# Patient Record
Sex: MALE | Race: WHITE | Employment: OTHER | URBAN - METROPOLITAN AREA
[De-identification: names, ages, dates, MRNs, and addresses within clinical notes are randomized per-mention and may not be internally consistent; named-entity substitution may affect disease eponyms.]

---

## 2017-01-03 ENCOUNTER — TELEPHONE (OUTPATIENT)
Dept: ORTHOPEDIC SURGERY | Age: 82
End: 2017-01-03

## 2017-01-10 ENCOUNTER — TELEPHONE (OUTPATIENT)
Dept: ORTHOPEDIC SURGERY | Age: 82
End: 2017-01-10

## 2017-01-12 ENCOUNTER — TELEPHONE (OUTPATIENT)
Dept: ORTHOPEDIC SURGERY | Age: 82
End: 2017-01-12

## 2017-01-18 ENCOUNTER — TELEPHONE (OUTPATIENT)
Dept: ORTHOPEDIC SURGERY | Age: 82
End: 2017-01-18

## 2017-01-19 ENCOUNTER — OFFICE VISIT (OUTPATIENT)
Dept: ORTHOPEDIC SURGERY | Age: 82
End: 2017-01-19

## 2017-01-19 VITALS
WEIGHT: 240.52 LBS | SYSTOLIC BLOOD PRESSURE: 133 MMHG | HEART RATE: 75 BPM | BODY MASS INDEX: 37.75 KG/M2 | HEIGHT: 67 IN | DIASTOLIC BLOOD PRESSURE: 71 MMHG

## 2017-01-19 DIAGNOSIS — Z98.890 STATUS POST LUMBAR LAMINECTOMY: Primary | ICD-10-CM

## 2017-01-19 PROCEDURE — 99024 POSTOP FOLLOW-UP VISIT: CPT | Performed by: PHYSICIAN ASSISTANT

## 2017-02-02 ENCOUNTER — OFFICE VISIT (OUTPATIENT)
Dept: ORTHOPEDIC SURGERY | Age: 82
End: 2017-02-02

## 2017-02-02 VITALS
DIASTOLIC BLOOD PRESSURE: 60 MMHG | WEIGHT: 240.52 LBS | HEART RATE: 83 BPM | BODY MASS INDEX: 37.75 KG/M2 | SYSTOLIC BLOOD PRESSURE: 104 MMHG | HEIGHT: 67 IN

## 2017-02-02 DIAGNOSIS — M48.061 LUMBAR SPINAL STENOSIS: Primary | ICD-10-CM

## 2017-02-02 PROCEDURE — 99024 POSTOP FOLLOW-UP VISIT: CPT | Performed by: ORTHOPAEDIC SURGERY

## 2017-02-14 ENCOUNTER — TELEPHONE (OUTPATIENT)
Dept: ORTHOPEDIC SURGERY | Age: 82
End: 2017-02-14

## 2017-02-14 RX ORDER — OXYCODONE HYDROCHLORIDE AND ACETAMINOPHEN 5; 325 MG/1; MG/1
1 TABLET ORAL EVERY 8 HOURS PRN
Qty: 60 TABLET | Refills: 0 | Status: ON HOLD | OUTPATIENT
Start: 2017-02-14 | End: 2017-04-24 | Stop reason: HOSPADM

## 2017-03-06 ENCOUNTER — OFFICE VISIT (OUTPATIENT)
Dept: ORTHOPEDIC SURGERY | Age: 82
End: 2017-03-06

## 2017-03-06 VITALS
BODY MASS INDEX: 37.75 KG/M2 | DIASTOLIC BLOOD PRESSURE: 77 MMHG | HEIGHT: 67 IN | HEART RATE: 86 BPM | SYSTOLIC BLOOD PRESSURE: 161 MMHG | WEIGHT: 240.52 LBS

## 2017-03-06 DIAGNOSIS — M51.36 LUMBAR DEGENERATIVE DISC DISEASE: Primary | ICD-10-CM

## 2017-03-06 PROCEDURE — 99024 POSTOP FOLLOW-UP VISIT: CPT | Performed by: ORTHOPAEDIC SURGERY

## 2017-03-14 ENCOUNTER — HOSPITAL ENCOUNTER (OUTPATIENT)
Dept: PHYSICAL THERAPY | Age: 82
Discharge: HOME | End: 2017-03-31
Admitting: ORTHOPAEDIC SURGERY

## 2017-03-20 ENCOUNTER — OFFICE VISIT (OUTPATIENT)
Dept: ORTHOPEDIC SURGERY | Age: 82
End: 2017-03-20

## 2017-03-20 VITALS
HEART RATE: 95 BPM | SYSTOLIC BLOOD PRESSURE: 112 MMHG | HEIGHT: 68 IN | BODY MASS INDEX: 36.68 KG/M2 | WEIGHT: 242 LBS | DIASTOLIC BLOOD PRESSURE: 66 MMHG

## 2017-03-20 DIAGNOSIS — M25.552 LEFT HIP PAIN: ICD-10-CM

## 2017-03-20 DIAGNOSIS — M16.12 PRIMARY OSTEOARTHRITIS OF LEFT HIP: Primary | ICD-10-CM

## 2017-03-20 PROCEDURE — G8484 FLU IMMUNIZE NO ADMIN: HCPCS | Performed by: ORTHOPAEDIC SURGERY

## 2017-03-20 PROCEDURE — 4040F PNEUMOC VAC/ADMIN/RCVD: CPT | Performed by: ORTHOPAEDIC SURGERY

## 2017-03-20 PROCEDURE — 99214 OFFICE O/P EST MOD 30 MIN: CPT | Performed by: ORTHOPAEDIC SURGERY

## 2017-03-20 PROCEDURE — G8417 CALC BMI ABV UP PARAM F/U: HCPCS | Performed by: ORTHOPAEDIC SURGERY

## 2017-03-20 PROCEDURE — 73502 X-RAY EXAM HIP UNI 2-3 VIEWS: CPT | Performed by: ORTHOPAEDIC SURGERY

## 2017-03-20 PROCEDURE — 1123F ACP DISCUSS/DSCN MKR DOCD: CPT | Performed by: ORTHOPAEDIC SURGERY

## 2017-03-20 PROCEDURE — G8427 DOCREV CUR MEDS BY ELIG CLIN: HCPCS | Performed by: ORTHOPAEDIC SURGERY

## 2017-03-20 PROCEDURE — 1036F TOBACCO NON-USER: CPT | Performed by: ORTHOPAEDIC SURGERY

## 2017-03-20 RX ORDER — HYDROCHLOROTHIAZIDE 25 MG/1
TABLET ORAL
COMMUNITY
Start: 2017-02-10 | End: 2017-04-10

## 2017-03-20 RX ORDER — METOPROLOL SUCCINATE 100 MG/1
100 TABLET, EXTENDED RELEASE ORAL DAILY
Status: ON HOLD | COMMUNITY
Start: 2017-02-14 | End: 2017-04-24 | Stop reason: HOSPADM

## 2017-03-21 ENCOUNTER — HOSPITAL ENCOUNTER (OUTPATIENT)
Dept: PHYSICAL THERAPY | Age: 82
Discharge: HOME | End: 2017-03-21
Admitting: ORTHOPAEDIC SURGERY

## 2017-03-21 ENCOUNTER — HOSPITAL ENCOUNTER (OUTPATIENT)
Dept: OTHER | Age: 82
Discharge: HOME | End: 2017-03-21
Attending: ORTHOPAEDIC SURGERY | Admitting: ORTHOPAEDIC SURGERY

## 2017-03-21 LAB
ALBUMIN SERPL-MCNC: 3.8 G/DL (ref 3.4–5)
ANION GAP SERPL CALCULATED.3IONS-SCNC: 18 MMOL/L (ref 3–16)
APTT: 28.9 SEC (ref 21–31.8)
BASOPHILS ABSOLUTE: 0 K/UL (ref 0–0.2)
BASOPHILS RELATIVE PERCENT: 0.5 %
BILIRUBIN URINE: NEGATIVE
BLOOD, URINE: NEGATIVE
BUN BLDV-MCNC: 16 MG/DL (ref 7–20)
CALCIUM SERPL-MCNC: 9.3 MG/DL (ref 8.3–10.6)
CHLORIDE BLD-SCNC: 100 MMOL/L (ref 99–110)
CLARITY: CLEAR
CO2: 22 MMOL/L (ref 21–32)
COLOR: YELLOW
CREAT SERPL-MCNC: 1 MG/DL (ref 0.8–1.3)
EOSINOPHILS ABSOLUTE: 0.2 K/UL (ref 0–0.6)
EOSINOPHILS RELATIVE PERCENT: 2.7 %
GFR AFRICAN AMERICAN: >60
GFR NON-AFRICAN AMERICAN: >60
GLUCOSE BLD-MCNC: 91 MG/DL (ref 70–99)
GLUCOSE URINE: NEGATIVE MG/DL
HCT VFR BLD CALC: 41.5 % (ref 40.5–52.5)
HEMOGLOBIN: 13.4 G/DL (ref 13.5–17.5)
INR BLD: 1.1 (ref 0.85–1.15)
KETONES, URINE: ABNORMAL MG/DL
LEUKOCYTE ESTERASE, URINE: NEGATIVE
LYMPHOCYTES ABSOLUTE: 1.4 K/UL (ref 1–5.1)
LYMPHOCYTES RELATIVE PERCENT: 16.1 %
MCH RBC QN AUTO: 29.9 PG (ref 26–34)
MCHC RBC AUTO-ENTMCNC: 32.4 G/DL (ref 31–36)
MCV RBC AUTO: 92.4 FL (ref 80–100)
MICROSCOPIC EXAMINATION: ABNORMAL
MONOCYTES ABSOLUTE: 1 K/UL (ref 0–1.3)
MONOCYTES RELATIVE PERCENT: 11.5 %
NEUTROPHILS ABSOLUTE: 6.2 K/UL (ref 1.7–7.7)
NEUTROPHILS RELATIVE PERCENT: 69.2 %
NITRITE, URINE: NEGATIVE
PDW BLD-RTO: 13.1 % (ref 12.4–15.4)
PH UA: 5.5
PLATELET # BLD: 326 K/UL (ref 135–450)
PMV BLD AUTO: 8 FL (ref 5–10.5)
POTASSIUM SERPL-SCNC: 4.6 MMOL/L (ref 3.5–5.1)
PROTEIN UA: NEGATIVE MG/DL
PROTHROMBIN TIME: 12.4 SEC (ref 9.6–13)
RBC # BLD: 4.5 M/UL (ref 4.2–5.9)
SODIUM BLD-SCNC: 140 MMOL/L (ref 136–145)
SPECIFIC GRAVITY UA: 1.02
TRANSFERRIN: 206 MG/DL (ref 200–360)
URINE TYPE: ABNORMAL
UROBILINOGEN, URINE: 0.2 E.U./DL
WBC # BLD: 9 K/UL (ref 4–11)

## 2017-03-22 LAB
ESTIMATED AVERAGE GLUCOSE: 148.5 MG/DL
HBA1C MFR BLD: 6.8 %

## 2017-03-23 LAB — URINE CULTURE, ROUTINE: NORMAL

## 2017-03-24 ENCOUNTER — TELEPHONE (OUTPATIENT)
Dept: ORTHOPEDIC SURGERY | Age: 82
End: 2017-03-24

## 2017-03-27 ENCOUNTER — HOSPITAL ENCOUNTER (OUTPATIENT)
Dept: PHYSICAL THERAPY | Age: 82
Discharge: HOME | End: 2017-03-27
Admitting: ORTHOPAEDIC SURGERY

## 2017-03-27 ENCOUNTER — TELEPHONE (OUTPATIENT)
Dept: ORTHOPEDIC SURGERY | Age: 82
End: 2017-03-27

## 2017-03-28 ENCOUNTER — TELEPHONE (OUTPATIENT)
Dept: ORTHOPEDIC SURGERY | Age: 82
End: 2017-03-28

## 2017-03-28 ENCOUNTER — HOSPITAL ENCOUNTER (OUTPATIENT)
Dept: PHYSICAL THERAPY | Age: 82
Discharge: HOME | End: 2017-03-28
Admitting: ORTHOPAEDIC SURGERY

## 2017-03-31 ENCOUNTER — TELEPHONE (OUTPATIENT)
Dept: ORTHOPEDIC SURGERY | Age: 82
End: 2017-03-31

## 2017-03-31 RX ORDER — HYDROCODONE BITARTRATE AND ACETAMINOPHEN 5; 325 MG/1; MG/1
1 TABLET ORAL EVERY 12 HOURS PRN
Qty: 40 TABLET | Refills: 0 | Status: SHIPPED | OUTPATIENT
Start: 2017-03-31 | End: 2017-04-10

## 2017-04-13 ENCOUNTER — TELEPHONE (OUTPATIENT)
Dept: ORTHOPEDIC SURGERY | Age: 82
End: 2017-04-13

## 2017-04-14 ENCOUNTER — OFFICE VISIT (OUTPATIENT)
Dept: ORTHOPEDIC SURGERY | Age: 82
End: 2017-04-14

## 2017-04-14 VITALS
SYSTOLIC BLOOD PRESSURE: 106 MMHG | BODY MASS INDEX: 35.55 KG/M2 | DIASTOLIC BLOOD PRESSURE: 64 MMHG | HEART RATE: 75 BPM | HEIGHT: 69 IN | WEIGHT: 240 LBS

## 2017-04-14 DIAGNOSIS — M54.50 LUMBAR SPINE PAIN: Primary | ICD-10-CM

## 2017-04-14 DIAGNOSIS — M51.36 DDD (DEGENERATIVE DISC DISEASE), LUMBAR: ICD-10-CM

## 2017-04-14 PROCEDURE — 1036F TOBACCO NON-USER: CPT | Performed by: PHYSICIAN ASSISTANT

## 2017-04-14 PROCEDURE — 4040F PNEUMOC VAC/ADMIN/RCVD: CPT | Performed by: PHYSICIAN ASSISTANT

## 2017-04-14 PROCEDURE — 99213 OFFICE O/P EST LOW 20 MIN: CPT | Performed by: PHYSICIAN ASSISTANT

## 2017-04-14 PROCEDURE — G8417 CALC BMI ABV UP PARAM F/U: HCPCS | Performed by: PHYSICIAN ASSISTANT

## 2017-04-14 PROCEDURE — G8427 DOCREV CUR MEDS BY ELIG CLIN: HCPCS | Performed by: PHYSICIAN ASSISTANT

## 2017-04-14 PROCEDURE — 1123F ACP DISCUSS/DSCN MKR DOCD: CPT | Performed by: PHYSICIAN ASSISTANT

## 2017-04-14 PROCEDURE — 72100 X-RAY EXAM L-S SPINE 2/3 VWS: CPT | Performed by: PHYSICIAN ASSISTANT

## 2017-04-14 RX ORDER — TIZANIDINE 4 MG/1
4 TABLET ORAL 3 TIMES DAILY
Qty: 30 TABLET | Refills: 0 | Status: SHIPPED | OUTPATIENT
Start: 2017-04-14 | End: 2017-04-14 | Stop reason: SDUPTHER

## 2017-04-17 DIAGNOSIS — M16.12 PRIMARY OSTEOARTHRITIS OF LEFT HIP: Primary | ICD-10-CM

## 2017-04-17 RX ORDER — TIZANIDINE 4 MG/1
TABLET ORAL
Qty: 270 TABLET | Refills: 0 | Status: ON HOLD | OUTPATIENT
Start: 2017-04-17 | End: 2017-04-24 | Stop reason: HOSPADM

## 2017-04-17 ASSESSMENT — PROMIS GLOBAL HEALTH SCALE
TO WHAT EXTENT ARE YOU ABLE TO CARRY OUT YOUR EVERYDAY PHYSICAL ACTIVITIES SUCH AS WALKING, CLIMBING STAIRS, CARRYING GROCERIES, OR MOVING A CHAIR [ON A SCALE OF 1 (NOT AT ALL) TO 5 (COMPLETELY)]?: 1
IN THE PAST 7 DAYS, HOW WOULD YOU RATE YOUR FATIGUE ON AVERAGE [ON A SCALE FROM 1 (NONE) TO 5 (VERY SEVERE)]?: 4
IN THE PAST 7 DAYS, HOW OFTEN HAVE YOU BEEN BOTHERED BY EMOTIONAL PROBLEMS, SUCH AS FEELING ANXIOUS, DEPRESSED, OR IRRITABLE [ON A SCALE FROM 1 (NEVER) TO 5 (ALWAYS)]?: 2
IN GENERAL, HOW WOULD YOU RATE YOUR PHYSICAL HEALTH [ON A SCALE OF 1 (POOR) TO 5 (EXCELLENT)]?: 2
IN GENERAL, WOULD YOU SAY YOUR HEALTH IS...[ON A SCALE OF 1 (POOR) TO 5 (EXCELLENT)]: 3
SUM OF RESPONSES TO QUESTIONS 3, 6, 7, & 8: 14
HOW IS THE PROMIS V1.1 BEING ADMINISTERED?: 0
IN GENERAL, HOW WOULD YOU RATE YOUR SATISFACTION WITH YOUR SOCIAL ACTIVITIES AND RELATIONSHIPS [ON A SCALE OF 1 (POOR) TO 5 (EXCELLENT)]?: 2
IN GENERAL, HOW WOULD YOU RATE YOUR MENTAL HEALTH, INCLUDING YOUR MOOD AND YOUR ABILITY TO THINK [ON A SCALE OF 1 (POOR) TO 5 (EXCELLENT)]?: 4
IN THE PAST 7 DAYS, HOW WOULD YOU RATE YOUR PAIN ON AVERAGE [ON A SCALE FROM 0 (NO PAIN) TO 10 (WORST IMAGINABLE PAIN)]?: 7
IN GENERAL, PLEASE RATE HOW WELL YOU CARRY OUT YOUR USUAL SOCIAL ACTIVITIES (INCLUDES ACTIVITIES AT HOME, AT WORK, AND IN YOUR COMMUNITY, AND RESPONSIBILITIES AS A PARENT, CHILD, SPOUSE, EMPLOYEE, FRIEND, ETC) [ON A SCALE OF 1 (POOR) TO 5 (EXCELLENT)]?: 1
IN GENERAL, WOULD YOU SAY YOUR QUALITY OF LIFE IS...[ON A SCALE OF 1 (POOR) TO 5 (EXCELLENT)]: 3
WHO IS THE PERSON COMPLETING THE PROMIS V1.1 SURVEY?: 0
SUM OF RESPONSES TO QUESTIONS 2, 4, 5, & 10: 11

## 2017-04-17 ASSESSMENT — HOOS JR
BENDING TO THE FLOOR TO PICK UP OBJECT: 2
LYING IN BED (TURNING OVER, MAINTAINING HIP POSITION): 3
RISING FROM SITTING: 3
HOOS JR RAW SCORE: 13
SITTING: 1
WALKING ON UNEVEN SURFACE: 2
GOING UP OR DOWN STAIRS: 2

## 2017-04-19 PROBLEM — Z96.642 STATUS POST TOTAL REPLACEMENT OF LEFT HIP: Status: ACTIVE | Noted: 2017-04-19

## 2017-04-27 ENCOUNTER — CARE COORDINATION (OUTPATIENT)
Dept: CASE MANAGEMENT | Age: 82
End: 2017-04-27

## 2017-05-01 ENCOUNTER — OFFICE VISIT (OUTPATIENT)
Dept: ORTHOPEDIC SURGERY | Age: 82
End: 2017-05-01

## 2017-05-01 VITALS
HEIGHT: 69 IN | WEIGHT: 260.58 LBS | SYSTOLIC BLOOD PRESSURE: 113 MMHG | HEART RATE: 89 BPM | BODY MASS INDEX: 38.6 KG/M2 | DIASTOLIC BLOOD PRESSURE: 56 MMHG

## 2017-05-01 DIAGNOSIS — Z96.642 STATUS POST TOTAL REPLACEMENT OF LEFT HIP: Primary | ICD-10-CM

## 2017-05-01 DIAGNOSIS — M16.12 PRIMARY OSTEOARTHRITIS OF LEFT HIP: ICD-10-CM

## 2017-05-01 DIAGNOSIS — M25.552 HIP PAIN, LEFT: ICD-10-CM

## 2017-05-01 PROCEDURE — 73502 X-RAY EXAM HIP UNI 2-3 VIEWS: CPT | Performed by: ORTHOPAEDIC SURGERY

## 2017-05-01 PROCEDURE — 99024 POSTOP FOLLOW-UP VISIT: CPT | Performed by: ORTHOPAEDIC SURGERY

## 2017-05-04 ENCOUNTER — CARE COORDINATION (OUTPATIENT)
Dept: CASE MANAGEMENT | Age: 82
End: 2017-05-04

## 2017-05-11 ENCOUNTER — CARE COORDINATION (OUTPATIENT)
Dept: CASE MANAGEMENT | Age: 82
End: 2017-05-11

## 2017-05-19 ENCOUNTER — CARE COORDINATION (OUTPATIENT)
Dept: CASE MANAGEMENT | Age: 82
End: 2017-05-19

## 2017-05-22 ENCOUNTER — CARE COORDINATION (OUTPATIENT)
Dept: CASE MANAGEMENT | Age: 82
End: 2017-05-22

## 2017-05-30 ENCOUNTER — OFFICE VISIT (OUTPATIENT)
Dept: ORTHOPEDIC SURGERY | Age: 82
End: 2017-05-30

## 2017-05-30 ENCOUNTER — CARE COORDINATION (OUTPATIENT)
Dept: CASE MANAGEMENT | Age: 82
End: 2017-05-30

## 2017-05-30 VITALS
BODY MASS INDEX: 38.6 KG/M2 | SYSTOLIC BLOOD PRESSURE: 142 MMHG | HEIGHT: 69 IN | WEIGHT: 260.58 LBS | DIASTOLIC BLOOD PRESSURE: 73 MMHG | HEART RATE: 74 BPM

## 2017-05-30 DIAGNOSIS — M54.2 NECK PAIN: Primary | ICD-10-CM

## 2017-05-30 DIAGNOSIS — M48.02 CERVICAL STENOSIS OF SPINAL CANAL: ICD-10-CM

## 2017-05-30 DIAGNOSIS — M16.10 PRIMARY OSTEOARTHRITIS OF HIP, UNSPECIFIED LATERALITY: Primary | ICD-10-CM

## 2017-05-30 PROCEDURE — 1036F TOBACCO NON-USER: CPT | Performed by: ORTHOPAEDIC SURGERY

## 2017-05-30 PROCEDURE — G8417 CALC BMI ABV UP PARAM F/U: HCPCS | Performed by: ORTHOPAEDIC SURGERY

## 2017-05-30 PROCEDURE — 72040 X-RAY EXAM NECK SPINE 2-3 VW: CPT | Performed by: ORTHOPAEDIC SURGERY

## 2017-05-30 PROCEDURE — 1123F ACP DISCUSS/DSCN MKR DOCD: CPT | Performed by: ORTHOPAEDIC SURGERY

## 2017-05-30 PROCEDURE — 4040F PNEUMOC VAC/ADMIN/RCVD: CPT | Performed by: ORTHOPAEDIC SURGERY

## 2017-05-30 PROCEDURE — 99213 OFFICE O/P EST LOW 20 MIN: CPT | Performed by: ORTHOPAEDIC SURGERY

## 2017-05-30 PROCEDURE — G8427 DOCREV CUR MEDS BY ELIG CLIN: HCPCS | Performed by: ORTHOPAEDIC SURGERY

## 2017-05-30 RX ORDER — METHYLPREDNISOLONE 4 MG/1
TABLET ORAL
Qty: 1 KIT | Refills: 0 | Status: SHIPPED | OUTPATIENT
Start: 2017-05-30 | End: 2017-06-05

## 2017-05-30 RX ORDER — TRAMADOL HYDROCHLORIDE 50 MG/1
TABLET ORAL
Qty: 20 TABLET | Refills: 0 | Status: SHIPPED | OUTPATIENT
Start: 2017-05-30 | End: 2017-06-06 | Stop reason: SDUPTHER

## 2017-05-31 ENCOUNTER — OFFICE VISIT (OUTPATIENT)
Dept: ORTHOPEDIC SURGERY | Age: 82
End: 2017-05-31

## 2017-05-31 DIAGNOSIS — Z96.642 STATUS POST TOTAL REPLACEMENT OF LEFT HIP: Primary | ICD-10-CM

## 2017-05-31 PROCEDURE — 99024 POSTOP FOLLOW-UP VISIT: CPT | Performed by: PHYSICIAN ASSISTANT

## 2017-06-02 ENCOUNTER — HOSPITAL ENCOUNTER (OUTPATIENT)
Dept: PHYSICAL THERAPY | Age: 82
Discharge: HOME | End: 2017-06-30
Admitting: ORTHOPAEDIC SURGERY

## 2017-06-02 ENCOUNTER — HOSPITAL ENCOUNTER (OUTPATIENT)
Dept: PHYSICAL THERAPY | Age: 82
Discharge: HOME | End: 2017-06-02

## 2017-06-05 ENCOUNTER — HOSPITAL ENCOUNTER (OUTPATIENT)
Dept: PHYSICAL THERAPY | Age: 82
Discharge: HOME | End: 2017-06-05
Admitting: ORTHOPAEDIC SURGERY

## 2017-06-05 ENCOUNTER — CARE COORDINATION (OUTPATIENT)
Dept: CASE MANAGEMENT | Age: 82
End: 2017-06-05

## 2017-06-05 DIAGNOSIS — M16.10 PRIMARY OSTEOARTHRITIS OF HIP, UNSPECIFIED LATERALITY: ICD-10-CM

## 2017-06-05 RX ORDER — TRAMADOL HYDROCHLORIDE 50 MG/1
TABLET ORAL
Qty: 20 TABLET | Refills: 0 | Status: CANCELLED | OUTPATIENT
Start: 2017-06-05

## 2017-06-06 DIAGNOSIS — M16.10 PRIMARY OSTEOARTHRITIS OF HIP, UNSPECIFIED LATERALITY: ICD-10-CM

## 2017-06-06 RX ORDER — TRAMADOL HYDROCHLORIDE 50 MG/1
TABLET ORAL
Qty: 20 TABLET | Refills: 0 | Status: SHIPPED | OUTPATIENT
Start: 2017-06-06 | End: 2017-06-13 | Stop reason: SDUPTHER

## 2017-06-08 ENCOUNTER — HOSPITAL ENCOUNTER (OUTPATIENT)
Dept: PHYSICAL THERAPY | Age: 82
Discharge: HOME | End: 2017-06-08
Admitting: ORTHOPAEDIC SURGERY

## 2017-06-12 ENCOUNTER — OFFICE VISIT (OUTPATIENT)
Dept: ORTHOPEDIC SURGERY | Age: 82
End: 2017-06-12

## 2017-06-12 VITALS
BODY MASS INDEX: 38.6 KG/M2 | HEIGHT: 69 IN | HEART RATE: 85 BPM | WEIGHT: 260.58 LBS | DIASTOLIC BLOOD PRESSURE: 69 MMHG | SYSTOLIC BLOOD PRESSURE: 123 MMHG

## 2017-06-12 DIAGNOSIS — M51.36 LUMBAR DEGENERATIVE DISC DISEASE: Primary | ICD-10-CM

## 2017-06-12 PROCEDURE — G8427 DOCREV CUR MEDS BY ELIG CLIN: HCPCS | Performed by: ORTHOPAEDIC SURGERY

## 2017-06-12 PROCEDURE — G8417 CALC BMI ABV UP PARAM F/U: HCPCS | Performed by: ORTHOPAEDIC SURGERY

## 2017-06-12 PROCEDURE — 4040F PNEUMOC VAC/ADMIN/RCVD: CPT | Performed by: ORTHOPAEDIC SURGERY

## 2017-06-12 PROCEDURE — 1123F ACP DISCUSS/DSCN MKR DOCD: CPT | Performed by: ORTHOPAEDIC SURGERY

## 2017-06-12 PROCEDURE — 99213 OFFICE O/P EST LOW 20 MIN: CPT | Performed by: ORTHOPAEDIC SURGERY

## 2017-06-12 PROCEDURE — 1036F TOBACCO NON-USER: CPT | Performed by: ORTHOPAEDIC SURGERY

## 2017-06-13 ENCOUNTER — CARE COORDINATION (OUTPATIENT)
Dept: CASE MANAGEMENT | Age: 82
End: 2017-06-13

## 2017-06-13 ENCOUNTER — HOSPITAL ENCOUNTER (OUTPATIENT)
Dept: PHYSICAL THERAPY | Age: 82
Discharge: HOME | End: 2017-06-13
Admitting: ORTHOPAEDIC SURGERY

## 2017-06-13 DIAGNOSIS — M16.10 PRIMARY OSTEOARTHRITIS OF HIP, UNSPECIFIED LATERALITY: ICD-10-CM

## 2017-06-13 RX ORDER — TRAMADOL HYDROCHLORIDE 50 MG/1
TABLET ORAL
Qty: 20 TABLET | Refills: 0 | OUTPATIENT
Start: 2017-06-13 | End: 2017-06-22 | Stop reason: SDUPTHER

## 2017-06-15 ENCOUNTER — HOSPITAL ENCOUNTER (OUTPATIENT)
Dept: MRI IMAGING | Age: 82
Discharge: HOME | End: 2017-06-15
Attending: ORTHOPAEDIC SURGERY | Admitting: ORTHOPAEDIC SURGERY

## 2017-06-15 ENCOUNTER — HOSPITAL ENCOUNTER (OUTPATIENT)
Dept: PHYSICAL THERAPY | Age: 82
Discharge: HOME | End: 2017-06-15
Admitting: ORTHOPAEDIC SURGERY

## 2017-06-15 DIAGNOSIS — M51.36 OTHER INTERVERTEBRAL DISC DEGENERATION, LUMBAR REGION: ICD-10-CM

## 2017-06-15 DIAGNOSIS — M51.36 LUMBAR DEGENERATIVE DISC DISEASE: ICD-10-CM

## 2017-06-15 LAB
BUN BLDV-MCNC: 15 MG/DL (ref 7–20)
CREAT SERPL-MCNC: 0.8 MG/DL (ref 0.8–1.3)
GFR AFRICAN AMERICAN: >60
GFR NON-AFRICAN AMERICAN: >60

## 2017-06-16 ENCOUNTER — TELEPHONE (OUTPATIENT)
Dept: ORTHOPEDIC SURGERY | Age: 82
End: 2017-06-16

## 2017-06-16 DIAGNOSIS — M51.36 DDD (DEGENERATIVE DISC DISEASE), LUMBAR: Primary | ICD-10-CM

## 2017-06-16 LAB
25-HYDROXYVITAMIN D2 AND D3: 40 NG/ML (ref 30–80)
A/G RATIO: 1.8 (ref 1–2.1)
ALBUMIN SERPL-MCNC: 4 G/DL (ref 3.5–5)
ALP BLD-CCNC: 139 U/L (ref 33–140)
ALT SERPL-CCNC: 12 U/L (ref 0–60)
ANION GAP SERPL CALCULATED.3IONS-SCNC: 12 MMOL/L (ref 5–13)
AST SERPL-CCNC: 15 U/L (ref 0–40)
BASOPHILS ABSOLUTE: 0 10*3/UL (ref 0–0.2)
BASOPHILS RELATIVE PERCENT: 0.4 %
BILIRUB SERPL-MCNC: 1.3 MG/DL (ref 0.2–1.2)
BUN / CREAT RATIO: 15
BUN BLDV-MCNC: 14 MG/DL (ref 7–25)
CALCIUM SERPL-MCNC: 9.4 MG/DL (ref 8.4–10.5)
CHLORIDE BLD-SCNC: 96 MMOL/L (ref 98–110)
CHOLESTEROL, TOTAL: 138 MG/DL (ref 125–199)
CHOLESTEROL/HDL RATIO: 3.3 (ref 0–5)
CO2: 28 MMOL/L (ref 22–29)
CREAT SERPL-MCNC: 0.93 MG/DL (ref 0.5–1.3)
EOSINOPHILS ABSOLUTE: 0.3 10*3/UL (ref 0–0.5)
EOSINOPHILS RELATIVE PERCENT: 2.9 %
ERYTHROCYTE SEDIMENTATION RATE: 37 MM/HR (ref 0–20)
ESTIMATED AVERAGE GLUCOSE: 103 MG/DL (ref 68–114)
GFR AFRICAN AMERICAN: 95 SEE NOTE
GFR NON-AFRICAN AMERICAN: 78 SEE NOTE
GLOBULIN: 2.2 G/DL (ref 2–3.7)
GLUCOSE BLD-MCNC: 91 MG/DL (ref 70–99)
GRANULOCYTE ABSOLUTE COUNT: 6.5 10*3/UL (ref 1.5–7.8)
HBA1C MFR BLD: 5.2 % (ref 4–5.6)
HCT VFR BLD CALC: 39.5 % (ref 38.5–50)
HDLC SERPL-MCNC: 42 MG/DL (ref 40–180)
HEMOGLOBIN: 12.8 G/DL (ref 13.2–17.1)
LDL CHOLESTEROL CALCULATED: 68 MG/DL (ref 0–100)
LYMPHOCYTES ABSOLUTE: 1.4 10*3/UL (ref 0.8–3.9)
LYMPHOCYTES RELATIVE PERCENT: 14.8 %
MCH RBC QN AUTO: 27.9 PG (ref 27–33)
MCHC RBC AUTO-ENTMCNC: 32.4 G/DL (ref 32–36)
MCV RBC AUTO: 86 FL (ref 80–100)
MONOCYTES ABSOLUTE: 1.2 10*3/UL (ref 0.2–0.9)
MONOCYTES RELATIVE PERCENT: 12.5 %
PDW BLD-RTO: 16.1 % (ref 11–15)
PLATELET # BLD: 281 10*3/UL (ref 140–400)
PLATELET ESTIMATE: NORMAL
PMV BLD AUTO: 8.7 FL (ref 7.5–11.5)
POTASSIUM SERPL-SCNC: 5 MMOL/L (ref 3.5–5.1)
RBC # BLD: 4.6 10*6/UL (ref 4.2–5.8)
SCAN DIFF: ABNORMAL
SEGMENTED NEUTROPHILS RELATIVE PERCENT: 69.4 %
SODIUM BLD-SCNC: 136 MMOL/L (ref 135–146)
TOTAL PROTEIN: 6.2 G/DL (ref 6–8)
TRIGL SERPL-MCNC: 141 MG/DL (ref 0–150)
TSH ULTRASENSITIVE: 2.71 UIU/ML (ref 0.35–4.94)
WBC # BLD: 9.3 10*3/UL (ref 3.8–10.8)

## 2017-06-21 DIAGNOSIS — M51.36 DDD (DEGENERATIVE DISC DISEASE), LUMBAR: Primary | ICD-10-CM

## 2017-06-22 ENCOUNTER — HOSPITAL ENCOUNTER (OUTPATIENT)
Dept: PHYSICAL THERAPY | Age: 82
Discharge: HOME | End: 2017-06-22
Admitting: ORTHOPAEDIC SURGERY

## 2017-06-22 DIAGNOSIS — M16.10 PRIMARY OSTEOARTHRITIS OF HIP, UNSPECIFIED LATERALITY: ICD-10-CM

## 2017-06-22 LAB — ERYTHROCYTE SEDIMENTATION RATE: 23 MM/HR (ref 0–20)

## 2017-06-22 RX ORDER — TRAMADOL HYDROCHLORIDE 50 MG/1
TABLET ORAL
Qty: 20 TABLET | Refills: 0 | OUTPATIENT
Start: 2017-06-22 | End: 2017-06-28 | Stop reason: SDUPTHER

## 2017-06-27 ENCOUNTER — TELEPHONE (OUTPATIENT)
Dept: ORTHOPEDIC SURGERY | Age: 82
End: 2017-06-27

## 2017-06-27 ENCOUNTER — CARE COORDINATION (OUTPATIENT)
Dept: CASE MANAGEMENT | Age: 82
End: 2017-06-27

## 2017-06-28 DIAGNOSIS — M16.10 PRIMARY OSTEOARTHRITIS OF HIP, UNSPECIFIED LATERALITY: ICD-10-CM

## 2017-06-29 ENCOUNTER — HOSPITAL ENCOUNTER (OUTPATIENT)
Dept: PHYSICAL THERAPY | Age: 82
Discharge: HOME | End: 2017-06-29
Admitting: ORTHOPAEDIC SURGERY

## 2017-06-29 RX ORDER — TRAMADOL HYDROCHLORIDE 50 MG/1
TABLET ORAL
Qty: 20 TABLET | Refills: 0 | OUTPATIENT
Start: 2017-06-29 | End: 2017-07-10 | Stop reason: SDUPTHER

## 2017-06-30 ENCOUNTER — TELEPHONE (OUTPATIENT)
Dept: ORTHOPEDIC SURGERY | Age: 82
End: 2017-06-30

## 2017-07-03 ENCOUNTER — OFFICE VISIT (OUTPATIENT)
Dept: ORTHOPEDIC SURGERY | Age: 82
End: 2017-07-03

## 2017-07-03 VITALS
SYSTOLIC BLOOD PRESSURE: 125 MMHG | DIASTOLIC BLOOD PRESSURE: 62 MMHG | HEIGHT: 69 IN | BODY MASS INDEX: 38.6 KG/M2 | HEART RATE: 75 BPM | WEIGHT: 260.58 LBS

## 2017-07-03 DIAGNOSIS — M48.02 CERVICAL STENOSIS OF SPINAL CANAL: Primary | ICD-10-CM

## 2017-07-03 PROCEDURE — 1036F TOBACCO NON-USER: CPT | Performed by: ORTHOPAEDIC SURGERY

## 2017-07-03 PROCEDURE — 1123F ACP DISCUSS/DSCN MKR DOCD: CPT | Performed by: ORTHOPAEDIC SURGERY

## 2017-07-03 PROCEDURE — G8427 DOCREV CUR MEDS BY ELIG CLIN: HCPCS | Performed by: ORTHOPAEDIC SURGERY

## 2017-07-03 PROCEDURE — 99213 OFFICE O/P EST LOW 20 MIN: CPT | Performed by: ORTHOPAEDIC SURGERY

## 2017-07-03 PROCEDURE — G8417 CALC BMI ABV UP PARAM F/U: HCPCS | Performed by: ORTHOPAEDIC SURGERY

## 2017-07-03 PROCEDURE — 4040F PNEUMOC VAC/ADMIN/RCVD: CPT | Performed by: ORTHOPAEDIC SURGERY

## 2017-07-03 RX ORDER — METOPROLOL TARTRATE 50 MG/1
TABLET, FILM COATED ORAL
Refills: 0 | COMMUNITY
Start: 2017-05-16 | End: 2018-01-22 | Stop reason: ALTCHOICE

## 2017-07-03 RX ORDER — OXYCODONE HYDROCHLORIDE AND ACETAMINOPHEN 5; 325 MG/1; MG/1
TABLET ORAL
COMMUNITY
End: 2017-07-11 | Stop reason: ALTCHOICE

## 2017-07-03 RX ORDER — OXYCODONE HYDROCHLORIDE AND ACETAMINOPHEN 5; 325 MG/1; MG/1
TABLET ORAL
COMMUNITY
Start: 2017-02-14 | End: 2017-07-11 | Stop reason: ALTCHOICE

## 2017-07-07 ENCOUNTER — HOSPITAL ENCOUNTER (OUTPATIENT)
Dept: PHYSICAL THERAPY | Age: 82
Discharge: HOME | End: 2017-07-31
Admitting: ORTHOPAEDIC SURGERY

## 2017-07-10 ENCOUNTER — CARE COORDINATION (OUTPATIENT)
Dept: CASE MANAGEMENT | Age: 82
End: 2017-07-10

## 2017-07-10 DIAGNOSIS — M16.10 PRIMARY OSTEOARTHRITIS OF HIP, UNSPECIFIED LATERALITY: ICD-10-CM

## 2017-07-10 RX ORDER — TRAMADOL HYDROCHLORIDE 50 MG/1
TABLET ORAL
Qty: 20 TABLET | Refills: 0 | OUTPATIENT
Start: 2017-07-10 | End: 2018-01-29 | Stop reason: ALTCHOICE

## 2017-07-11 ENCOUNTER — HOSPITAL ENCOUNTER (OUTPATIENT)
Dept: PHYSICAL THERAPY | Age: 82
Discharge: HOME | End: 2017-07-11
Admitting: ORTHOPAEDIC SURGERY

## 2017-07-11 ENCOUNTER — OFFICE VISIT (OUTPATIENT)
Dept: ORTHOPEDIC SURGERY | Age: 82
End: 2017-07-11

## 2017-07-11 VITALS
BODY MASS INDEX: 38.6 KG/M2 | DIASTOLIC BLOOD PRESSURE: 81 MMHG | HEART RATE: 68 BPM | HEIGHT: 69 IN | WEIGHT: 260.58 LBS | SYSTOLIC BLOOD PRESSURE: 147 MMHG

## 2017-07-11 DIAGNOSIS — M16.10 PRIMARY OSTEOARTHRITIS OF HIP, UNSPECIFIED LATERALITY: Primary | ICD-10-CM

## 2017-07-11 PROCEDURE — 99024 POSTOP FOLLOW-UP VISIT: CPT | Performed by: PHYSICIAN ASSISTANT

## 2017-07-13 ENCOUNTER — HOSPITAL ENCOUNTER (OUTPATIENT)
Dept: PHYSICAL THERAPY | Age: 82
Discharge: HOME | End: 2017-07-13
Admitting: ORTHOPAEDIC SURGERY

## 2017-07-18 ENCOUNTER — HOSPITAL ENCOUNTER (OUTPATIENT)
Dept: PHYSICAL THERAPY | Age: 82
Discharge: HOME | End: 2017-07-18
Admitting: ORTHOPAEDIC SURGERY

## 2017-07-24 ENCOUNTER — TELEPHONE (OUTPATIENT)
Dept: ORTHOPEDIC SURGERY | Age: 82
End: 2017-07-24

## 2017-07-25 ENCOUNTER — HOSPITAL ENCOUNTER (OUTPATIENT)
Dept: PHYSICAL THERAPY | Age: 82
Discharge: HOME | End: 2017-07-25
Admitting: ORTHOPAEDIC SURGERY

## 2017-07-27 ENCOUNTER — OFFICE VISIT (OUTPATIENT)
Dept: ORTHOPEDIC SURGERY | Age: 82
End: 2017-07-27

## 2017-07-27 DIAGNOSIS — M54.12 CERVICAL RADICULOPATHY: Primary | ICD-10-CM

## 2017-07-27 DIAGNOSIS — G56.23 ULNAR NEUROPATHY OF BOTH UPPER EXTREMITIES: ICD-10-CM

## 2017-07-27 PROCEDURE — 95909 NRV CNDJ TST 5-6 STUDIES: CPT | Performed by: PHYSICAL MEDICINE & REHABILITATION

## 2017-07-27 PROCEDURE — 1036F TOBACCO NON-USER: CPT | Performed by: PHYSICAL MEDICINE & REHABILITATION

## 2017-07-27 PROCEDURE — 95886 MUSC TEST DONE W/N TEST COMP: CPT | Performed by: PHYSICAL MEDICINE & REHABILITATION

## 2017-08-01 ENCOUNTER — HOSPITAL ENCOUNTER (OUTPATIENT)
Dept: PHYSICAL THERAPY | Age: 82
Discharge: HOME | End: 2017-08-01
Admitting: ORTHOPAEDIC SURGERY

## 2017-08-08 ENCOUNTER — HOSPITAL ENCOUNTER (OUTPATIENT)
Dept: PHYSICAL THERAPY | Age: 82
Discharge: HOME | End: 2017-08-08
Admitting: ORTHOPAEDIC SURGERY

## 2017-08-11 ENCOUNTER — TELEPHONE (OUTPATIENT)
Dept: ORTHOPEDIC SURGERY | Age: 82
End: 2017-08-11

## 2017-08-14 ENCOUNTER — TELEPHONE (OUTPATIENT)
Dept: ORTHOPEDIC SURGERY | Age: 82
End: 2017-08-14

## 2017-08-16 ENCOUNTER — HOSPITAL ENCOUNTER (OUTPATIENT)
Dept: PHYSICAL THERAPY | Age: 82
Discharge: HOME | End: 2017-08-16
Admitting: ORTHOPAEDIC SURGERY

## 2017-09-28 ENCOUNTER — TELEPHONE (OUTPATIENT)
Dept: ORTHOPEDIC SURGERY | Age: 82
End: 2017-09-28

## 2017-10-04 ENCOUNTER — OFFICE VISIT (OUTPATIENT)
Dept: ORTHOPEDIC SURGERY | Age: 82
End: 2017-10-04

## 2017-10-04 ENCOUNTER — HOSPITAL ENCOUNTER (OUTPATIENT)
Dept: OCCUPATIONAL THERAPY | Age: 82
Discharge: HOME | End: 2017-10-31
Admitting: ORTHOPAEDIC SURGERY

## 2017-10-04 VITALS
HEART RATE: 99 BPM | BODY MASS INDEX: 37.35 KG/M2 | HEIGHT: 67 IN | WEIGHT: 238 LBS | DIASTOLIC BLOOD PRESSURE: 51 MMHG | SYSTOLIC BLOOD PRESSURE: 97 MMHG

## 2017-10-04 DIAGNOSIS — R52 PAIN: Primary | ICD-10-CM

## 2017-10-04 DIAGNOSIS — M19.049 HAND ARTHRITIS: ICD-10-CM

## 2017-10-04 PROCEDURE — 20600 DRAIN/INJ JOINT/BURSA W/O US: CPT | Performed by: ORTHOPAEDIC SURGERY

## 2017-10-04 PROCEDURE — 1123F ACP DISCUSS/DSCN MKR DOCD: CPT | Performed by: ORTHOPAEDIC SURGERY

## 2017-10-04 PROCEDURE — 73130 X-RAY EXAM OF HAND: CPT | Performed by: ORTHOPAEDIC SURGERY

## 2017-10-04 PROCEDURE — G8484 FLU IMMUNIZE NO ADMIN: HCPCS | Performed by: ORTHOPAEDIC SURGERY

## 2017-10-04 PROCEDURE — 99203 OFFICE O/P NEW LOW 30 MIN: CPT | Performed by: ORTHOPAEDIC SURGERY

## 2017-10-04 PROCEDURE — G8417 CALC BMI ABV UP PARAM F/U: HCPCS | Performed by: ORTHOPAEDIC SURGERY

## 2017-10-04 PROCEDURE — 4040F PNEUMOC VAC/ADMIN/RCVD: CPT | Performed by: ORTHOPAEDIC SURGERY

## 2017-10-04 PROCEDURE — 1036F TOBACCO NON-USER: CPT | Performed by: ORTHOPAEDIC SURGERY

## 2017-10-04 PROCEDURE — G8427 DOCREV CUR MEDS BY ELIG CLIN: HCPCS | Performed by: ORTHOPAEDIC SURGERY

## 2017-10-04 NOTE — PLAN OF CARE
splint. Onset/Surgery Date: no surgery, MCP arthroplasties recommended  Dominant Hand:    [x] Right []Left  Occupational/Vocational Status: retired  Progress of any previous OT/PT: the patient []has/ [x]has not received OT/PT previously for this diagnosis. Pain: 5    Objective Findings as appropriate:  ROM, strength, edema, wound/ scar appearance, function:  Visible swollen MCP joints most pronounced in right index and middle. Band aid over dorsum of joints from recent cortisone injection. Some deformities in DIP joints of right hand as well. Patient uses a straight cane for ambulation in right hand. Type of splint:   Volar hand based MCP flexion block to right index and middle fingers. Splint protocol utilization:   Recommended at night and as needed for pain during the day. Splint Purpose: [x]Immobilize or protect []Promote healing of    [x]Relieve pain  [x]Provide support for improved hand function []Maximize joint motion    Treatment:   [x]Splint provided ([x]Customized/ []Prefabricated), and splint rationale explained. [x]Patient instructed in []wear/ [x]care of splint and educated regarding diagnosis. []Patient instructed in symptom reduction techniques   []HEP instruction    []Discussed ADL assistive device    Written Information Distributed: []HEP  [x]Splint care and wearing protocol    Patient response to evaluation and instructions:  [x]Attentive/interested   [x]Asked questions/ retained info  []Appeared disinterested  []Poor retention of information  []Appeared anxious/ fearful    Assessment and Plan:  Goals: [x]Patient will be able to verbalize rationale for, and demonstrate proper wearing     of splint. [x]Splint will provide proper fit and function. []Patient will be able to verbalize 2-3 ways to prevent further symptoms. [x]Patient will be able to don and doff independently.    []Patient will be independent with HEP    Goals met:  [x]yes []no    Plan:  [x]Splint completed with good fit and function. Hand Therapy to follow up for     splint modifications as needed    []Splint completed; OT/PT evaluation initiated. Patient to return for further     treatment.     Jeb Snyder , [address] [facility]

## 2017-10-04 NOTE — PROGRESS NOTES
Assessment: Severe osteoarthritis of the right hand particularly today the index and middle metacarpal phalangeal joints are quite painful. He also has grade 4 CMC arthritis. He has developed a significant bone spur at the interphalangeal joint of the thumb. 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 I do not think he would do well with MP joint replacements as he still fairly strong gentleman and does walk with a cane. I think MP arthrodesis would probably be necessary if surgical intervention becomes necessary. We are going to try to treat this more conservatively and have injected both the index and middle MP joints today as well as giving him a hand-based splint to use when he is quite symptomatic. Return if symptoms worsen or fail to improve. Chief Complaint:  Hand Problem (Several year history of right hand pain and swelling- PCP gave -prednisone pack 2 weeks ago that helped with the swelling and it is back now)      History of Present Illness  Dav Boateng is a 80 y.o. male. Patient's very nice gentleman that I saw about 3-1/2 years ago for osteoarthritis in multiple locations we previously taken osteophyte mucous cyst off of one of his digits. He is been having increasing pain and swelling in the index and middle metacarpal phalangeal joints. Dr. Yair Virk gave him a prescription for Medrol Dosepak and while he was taking this really helped the swelling and discomfort but as soon as he stopped this he started to have increasing discomfort again. The pain is mainly in the index and middle MP joints and a becomes quite swollen. Medical History  Patient's medications, allergies, past medical, surgical, social and family histories were reviewed and updated as appropriate.     Review of Systems  Complete Review of Systems performed and is non-contributory except for what is noted in HPI. Vital Signs  Vitals:    10/04/17 1414   BP: (!) 97/51   Site: Right Arm   Position: Sitting   Pulse: 99   Weight: 238 lb (108 kg)   Height: 5' 7\" (1.702 m)     Body mass index is 37.28 kg/(m^2). Physical Exam  Constitutional:  Patient is well-nourished and demonstrates normal hygiene. Mental Status:  Patient is alert and oriented to person, place and time. Skin:  No rashes or erythema    Right Hand Examination:  Inspection:  Definite objective swelling of index and middle MP joints radial deviation deformity the index DIP joint  Finger Range of Motion:  He has range of motion of the MP joint of the index and middle fingers from about 10 to about 45° flexion. PIP still have relatively good full range of motion DIP joint is very limited  Wrist Range of Motion:  Normal  Vascular Exam:  Radial and ulnar arterial pulses are normal.  Ash's Test reveals patent palmar arch. Neurologic Exam: Negative Tinel's and Phalen's  Intrinsic Muscle Strength:  Normal  Extrinsic Muscle Strength: Normal  Special Tests:  Palpable synovitis is present in index and middle MP joints    Left Hand Examination:  Inspection:  Minimal swelling  Finger Range of Motion:  Decreased DIP and MP range of motion  Wrist Range of Motion:  Normal  Vascular Exam:  Radial and ulnar arterial pulses are normal.  Ash's Test reveals patent palmar arch. Neurologic Exam: Negative Tinel's and Phalen's  Intrinsic Muscle Strength:  Normal  Extrinsic Muscle Strength: Normal  Special Tests:          Additional Comments:     Additional Examinations:  X-Ray Findings:  PA lateral and oblique x-rays of the right hand show bone-on-bone arthritis of the index and middle metacarpal phalangeal joints. There is significant destruction of the index DIP joint with radial deviation deformity. Grade 4 CMC joint osteoarthritis.   Significant joint space narrowing of the thumb metacarpal phalangeal joint and very large bone spur formation with complete destruction of the interphalangeal joint of the thumb  Additional Diagnostic Test Findings:    Office Procedures: #1 after discussing the procedure with the patient I injected the right middle metacarpal phalangeal joint with 1 mL betamethasone 1/2 mL Xylocaine under sterile technique. #2 after discussing the procedure with the patient I also injected the index metacarpal phalangeal joint with 1 mL betamethasone 1/2 mL Xylocaine under sterile technique.     Orders Placed This Encounter   Procedures    XR HAND RIGHT (MIN 3 VIEWS)     54551     Order Specific Question:   Reason for exam:     Answer:   Hand Pain    [facility] - [facility] Occupational [facility]     Referral Priority:   Routine     Referral Type:   Eval and Treat     Referral Reason:   Specialty Services Required     Requested Specialty:   Occupational Therapy     Number of Visits Requested:   1    ND BETAMETHASONE ACET&SOD PHOSP    ND ARTHROCENTESIS ASPIR&/INJ SMALL JT/BURSA W/O US     Right index and right middle MCP joint injection

## 2017-10-04 NOTE — MR AVS SNAPSHOT
After Visit Summary             Escobar Martinez   10/4/2017 2:00 PM   Office Visit    Description:  Male : 1935   Provider:  Hunter Denton MD   Department:  [facility]              Your Follow-Up and Future Appointments         Below is a list of your follow-up and future appointments. This may not be a complete list as you may have made appointments directly with providers that we are not aware of or your providers may have made some for you. Please call your providers to confirm appointments. It is important to keep your appointments. Please bring your current insurance card, photo ID, co-pay, and all medication bottles to your appointment. If self-pay, payment is expected at the time of service. Information from Your Visit        Department     Name Address Phone Fax    [facility] [address] [facility]  [address] [phone]      You Were Seen for:         Comments    Pain   [409891]         Vital Signs     Blood Pressure Pulse Height Weight Body Mass Index Smoking Status    97/51 (Site: Right Arm, Position: Sitting) 99 5' 7\" (1.702 m) 238 lb (108 kg) 37.28 kg/m2 Former Smoker      Additional Information about your Body Mass Index (BMI)           Your BMI as listed above is considered obese (30 or more). BMI is an estimate of body fat, calculated from your height and weight. The higher your BMI, the greater your risk of heart disease, high blood pressure, type 2 diabetes, stroke, gallstones, arthritis, sleep apnea, and certain cancers. BMI is not perfect. It may overestimate body fat in athletes and people who are more muscular. Even a small weight loss (between 5 and 10 percent of your current weight) by decreasing your calorie intake and becoming more physically active will help lower your risk of developing or worsening diseases associated with obesity. Learn more at: [email or web address]             Medications and Orders      Your Current Medications Are              traMADol (ULTRAM) 50 MG tablet Take 1 tabs PO q 6 hours PRN pain    metoprolol tartrate (LOPRESSOR) 50 MG tablet TK 1 T PO QD    lisinopril (PRINIVIL;ZESTRIL) 5 MG tablet Take 1 tablet by mouth daily    metoprolol succinate (TOPROL XL) 50 MG extended release tablet Take 1 tablet by mouth daily    VOLTAREN 1 % GEL APPLY 4 GRAMS TOPICALLY FOUR TIMES DAILY    peppermint oil liquid Take 1 capsule by mouth as needed (FOR STOMACH UPSET)     albuterol sulfate  (90 BASE) MCG/ACT inhaler Inhale 2 puffs into the lungs every 6 hours as needed for Wheezing    levothyroxine (SYNTHROID) 75 MCG tablet Take 75 mcg by mouth Daily    Vitamin D (CHOLECALCIFEROL) 1000 UNITS CAPS capsule Take 1,000 Units by mouth daily    aspirin 81 MG tablet Take 81 mg by mouth daily    diclofenac sodium 1 % GEL Apply 2 g topically as needed. atorvastatin (LIPITOR) 20 MG tablet Take 20 mg by mouth nightly. tamsulosin (FLOMAX) 0.4 MG capsule Take 0.4 mg by mouth 2 times daily. finasteride (PROSCAR) 5 MG tablet Take 5 mg by mouth daily. zolpidem (AMBIEN) 10 MG tablet Take 10 mg by mouth nightly as needed. therapeutic multivitamin-minerals (THERAGRAN-M) tablet Take 1 tablet by mouth daily.       Allergies              Erythromycin Hives    Penicillins Hives    Sulfa Antibiotics Hives    Tetanus Toxoids Hives    Tetracyclines & Related       We Ordered/Performed the following           XR HAND RIGHT (MIN 3 VIEWS)     Comments:    84236         Result Summary for XR HAND RIGHT (MIN 3 VIEWS)      Result Information     Status          Final result (Exam End: 10/4/2017  2:06 PM)           10/4/2017  2:06 PM      Narrative & Impression           Radiology result is complete; follow up with provider / physician office for radiology results                       Additional Information Basic Information     Date Of Birth Sex Race Ethnicity Preferred Language    1935 Male White Non-/Non  English      Problem List as of 10/4/2017  Date Reviewed: 7/11/2017                Chest pain    Essential hypertension    Status post total replacement of left hip    Primary osteoarthritis of left hip    Left hip pain    Encounter for postoperative wound check    Hypertension    Thyroid disease      Immunizations as of 10/4/2017     Name Date    Influenza Vaccine, unspecified formulation 9/30/2016      Preventive Care        Date Due    Zoster Vaccine 12/23/1997    Yearly Flu Vaccine (1) 9/1/2017    Cholesterol Screening 6/16/2022            MyChart Signup           [facility] allows you to send messages to your doctor, view your test results, renew your prescriptions, schedule appointments, view visit notes, and more. How Do I Sign Up? 1. In your Internet browser, go to [email or web address]  2. Click on the Sign Up Now link in the Sign In box. You will see the New Member Sign Up page. 3. Enter your [facility] Access Code exactly as it appears below. You will not need to use this code after youve completed the sign-up process. If you do not sign up before the expiration date, you must request a new code. [facility] Access Code: [email or web address]  Expires: 12/3/2017  2:19 PM    4. Enter your Social Security Number ([id number]) and Date of Birth (mm/dd/yyyy) as indicated and click Submit. You will be taken to the next sign-up page. 5. Create a [facility] ID. This will be your [facility] login ID and cannot be changed, so think of one that is secure and easy to remember. 6. Create a [facility] password. You can change your password at any time. 7. Enter your Password Reset Question and Answer. This can be used at a later time if you forget your password. 8. Enter your e-mail address. You will receive e-mail notification when new information is available in [address] 9. Click Sign Up. You can now view your medical record. Additional Information  If you have questions, please contact the physician practice where you receive care. Remember, [employer] is NOT to be used for urgent needs. For medical emergencies, dial 911. For questions regarding your [email or web address] account call [phone]. If you have a clinical question, please call your doctor's office.

## 2017-10-05 ENCOUNTER — TELEPHONE (OUTPATIENT)
Dept: ORTHOPEDIC SURGERY | Age: 82
End: 2017-10-05

## 2017-11-01 ENCOUNTER — HOSPITAL ENCOUNTER (OUTPATIENT)
Dept: OTHER | Age: 82
Discharge: HOME | End: 2017-11-30
Attending: INTERNAL MEDICINE | Admitting: INTERNAL MEDICINE

## 2017-11-01 ENCOUNTER — HOSPITAL ENCOUNTER (OUTPATIENT)
Dept: OCCUPATIONAL THERAPY | Age: 82
Discharge: HOME | End: 2017-11-30
Attending: ORTHOPAEDIC SURGERY | Admitting: ORTHOPAEDIC SURGERY

## 2017-11-29 ENCOUNTER — HOSPITAL ENCOUNTER (OUTPATIENT)
Dept: PHYSICAL THERAPY | Age: 82
Discharge: HOME | End: 2017-11-30

## 2017-11-29 NOTE — FLOWSHEET NOTE
15    HL TA with Clamshells 5\" x 15                   Pt ed  HEP, POC, posture, activity modification and easing back into cardiac rehab                       Manual Intervention                                              NMR re-education                                 Therapeutic Exercise and NMR EXR  [x] (54624) Provided verbal/tactile cueing for activities related to strengthening, flexibility, endurance, ROM  for improvements in proximal hip and core control with self care, mobility, lifting and ambulation.  [] (15050) Provided verbal/tactile cueing for activities related to improving balance, coordination, kinesthetic sense, posture, motor skill, proprioception  to assist with core control in self care, mobility, lifting, and ambulation.      Therapeutic Activities:    [] (48144 or 68322) Provided verbal/tactile cueing for activities related to improving balance, coordination, kinesthetic sense, posture, motor skill, proprioception and motor activation to allow for proper function  with self care and ADLs  [] (87825) Provided training and instruction to the patient for proper core and proximal hip recruitment and positioning with ambulation re-education     Home Exercise Program:    [x] (73519) Reviewed/Progressed HEP activities related to strengthening, flexibility, endurance, ROM of core, proximal hip and LE for functional self-care, mobility, lifting and ambulation   [] (15648) Reviewed/Progressed HEP activities related to improving balance, coordination, kinesthetic sense, posture, motor skill, proprioception of core, proximal hip and LE for self care, mobility, lifting, and ambulation      Manual Treatments:  PROM / STM / Oscillations-Mobs:  G-I, II, III, IV (PA's, Inf., Post.)  [] (84295) Provided manual therapy to mobilize proximal hip and LS spine soft tissue/joints for the purpose of modulating pain, promoting relaxation,  increasing ROM, reducing/eliminating soft tissue swelling/inflammation/restriction, improving soft tissue extensibility and allowing for proper ROM for normal function with self care, mobility, lifting and ambulation. Modalities:   PM/HP x 15' to cervical spine; HP x 15' to lumbar spine while seated    Charges:  Timed Code Treatment Minutes: 25'   Total Treatment Minutes: 72'     [x] EVAL (LOW) 95889 (typically 20 minutes face-to-face)  [] EVAL (MOD) 04764 (typically 30 minutes face-to-face)  [] EVAL (HIGH) 92125 (typically 45 minutes face-to-face)  [] RE-EVAL     [x] KA(72447) x  2   [] IONTO  [] NMR (47908) x      [] VASO  [] Manual (61680) x       [] Other:  [] TA x       [] Mech Traction (39391)  [] ES(attended) (60482)      [x] ES (un) (50179):     Goals:   Short Term Goals: To be achieved in: 2 weeks  1. Independent in HEP and progression per patient tolerance, in order to prevent re-injury. 2. Patient will have a decrease in pain to facilitate improvement in movement, function, and ADLs as indicated by Functional Deficits. Long Term Goals: To be achieved in: 8 weeks  1. Disability index score of 16% or less for the Modfied Oswestry  to assist with reaching prior level of function. 2. Patient will demonstrate increased AROM to WNL, good LS mobility, good hip ROM to allow for proper joint functioning as indicated by patients Functional Deficits. 3. Patient will demonstrate an increase in Strength to good proximal hip and core activation to allow for proper functional mobility as indicated by patients Functional Deficits. 4. Patient will be able to fully participate in cardiac rehab without increased symptoms or restriction. 5. Patient will be able to stand for 30 minutes without an increase in pain or N/T.  (patient specific functional goal)    Progression Towards Functional goals:  [] Patient is progressing as expected towards functional goals listed. [] Progression is slowed due to complexities listed.   [] Progression has been slowed due

## 2017-12-01 ENCOUNTER — HOSPITAL ENCOUNTER (OUTPATIENT)
Dept: PHYSICAL THERAPY | Age: 82
Discharge: HOME | End: 2017-12-31

## 2017-12-01 ENCOUNTER — HOSPITAL ENCOUNTER (OUTPATIENT)
Dept: OTHER | Age: 82
Discharge: HOME | End: 2017-12-31
Attending: INTERNAL MEDICINE | Admitting: INTERNAL MEDICINE

## 2017-12-04 ENCOUNTER — HOSPITAL ENCOUNTER (OUTPATIENT)
Dept: PHYSICAL THERAPY | Age: 82
Discharge: HOME | End: 2017-12-04

## 2017-12-04 NOTE — FLOWSHEET NOTE
[facility], [address]  Phone: [phone]  Fax [phone]    Physical Therapy Daily Treatment Note  Date:  2017    Patient Name:  Escobar Martinez    :  1935  MRN: [id number]  Restrictions/Precautions:    Medical/Treatment Diagnosis Information:  · Diagnosis: Lumbar OA with radiculopathy (M47.27), Cervical spine stenosis (M48.02), Cervical spine OA (M47.22)  · Treatment Diagnosis: Low back pain (M54.5), Neck pain (L74.6)  Insurance/Certification information:  PT Insurance Information: /FP Complete  Physician Information:  Referring Practitioner: Edward Bland of care signed (Y/N):     Date of Patient follow up with Physician:     G-Code (if applicable):      Date G-Code Applied:    PT G-Codes  Functional Assessment Tool Used: Modified Oswestry  Score: 32%  Functional Limitation: Changing and maintaining body position  Changing and Maintaining Body Position Current Status (): At least 20 percent but less than 40 percent impaired, limited or restricted  Changing and Maintaining Body Position Goal Status (): At least 1 percent but less than 20 percent impaired, limited or restricted    Progress Note: []  Yes  []  No  Next due by: Visit #10      Latex Allergy:  [x]NO      []YES  Preferred Language for Healthcare:   [x]English       []other:     Visit # Insurance Allowable Requires auth   2 Medicare    []no        []yes:     Pain level:  3-4/10 Arms/shoulder and legs    SUBJECTIVE:  Patient states lost exercise sheet for HEP. Increase pain with movement. Less painful at rest.  Movement increases pain into arms and hands along with legs. Soreness occasionally at rest. Started cardiac rehab today, did the NuStep.      OBJECTIVE:   Observation:   Test measurements:      RESTRICTIONS/PRECAUTIONS: Hx of B TKA, B INOCENCIO, lumbar spine surgery    Exercises/Interventions:     Therapeutic Ex sets/reps comments   Upper trap stretch B 15\" x 3 Gentle, needs cueing   Scap squeezes 5\" x 15 Needs cueing   No $ 5\" x 15 Needs cueing   Supine chin tucks 10 x 5\"    Supine pec stretch 3 x 20\"     Standing HS stretch on step 30\" x 3 B  Use staircase due to decreased balance  Experienced dizziness following ex, /74, 91 O2, 94 HR   LTR 10 x 5\" B    HL TA with ADD 5\" x 15    HL TA with Clamshells [place] 5\" x 15 GTB for home    Bridgess 15 x 3\"    SB DKTC 10 x 5\"     Supine hip flex stretch 3 x 20\"     Pt ed  HEP, POC, posture, activity modification and easing back into cardiac rehab   TB rows Red 15 x                    Manual Intervention      Manual HS stretch  3 x 20\" bilat                                       NMR re-education                                 Therapeutic Exercise and NMR EXR  [x] (02282) Provided verbal/tactile cueing for activities related to strengthening, flexibility, endurance, ROM  for improvements in proximal hip and core control with self care, mobility, lifting and ambulation.  [] (05424) Provided verbal/tactile cueing for activities related to improving balance, coordination, kinesthetic sense, posture, motor skill, proprioception  to assist with core control in self care, mobility, lifting, and ambulation.      Therapeutic Activities:    [] (74205 or 02712) Provided verbal/tactile cueing for activities related to improving balance, coordination, kinesthetic sense, posture, motor skill, proprioception and motor activation to allow for proper function  with self care and ADLs  [] (74283) Provided training and instruction to the patient for proper core and proximal hip recruitment and positioning with ambulation re-education     Home Exercise Program:    [x] (83420) Reviewed/Progressed HEP activities related to strengthening, flexibility, endurance, ROM of core, proximal hip and LE for functional self-care, mobility, lifting and ambulation   [] (29395) Reviewed/Progressed HEP activities related to improving balance, coordination, kinesthetic sense, posture, motor skill, proprioception of core, proximal hip and LE for self care, mobility, lifting, and ambulation      Manual Treatments:  PROM / STM / Oscillations-Mobs:  G-I, II, III, IV (PA's, Inf., Post.)  [] (94581) Provided manual therapy to mobilize proximal hip and LS spine soft tissue/joints for the purpose of modulating pain, promoting relaxation,  increasing ROM, reducing/eliminating soft tissue swelling/inflammation/restriction, improving soft tissue extensibility and allowing for proper ROM for normal function with self care, mobility, lifting and ambulation. Modalities:   PM/HP x 15' to cervical spine; HP x 15' to lumbar spine while seated    Charges:  Timed Code Treatment Minutes: 38   Total Treatment Minutes: 53     [] EVAL (LOW) 50560 (typically 20 minutes face-to-face)  [] EVAL (MOD) 99863 (typically 30 minutes face-to-face)  [] EVAL (HIGH) 23810 (typically 45 minutes face-to-face)  [] RE-EVAL     [x] JF(16942) x  3   [] IONTO  [] NMR (86543) x      [] VASO  [] Manual (30835) x       [] Other:  [] TA x       [] Mech Traction (98662)  [] ES(attended) (16113)      [x] ES (un) (06144):     Goals:   Short Term Goals: To be achieved in: 2 weeks  1. Independent in HEP and progression per patient tolerance, in order to prevent re-injury. 2. Patient will have a decrease in pain to facilitate improvement in movement, function, and ADLs as indicated by Functional Deficits. Long Term Goals: To be achieved in: 8 weeks  1. Disability index score of 16% or less for the Modfied Oswestry  to assist with reaching prior level of function. 2. Patient will demonstrate increased AROM to WNL, good LS mobility, good hip ROM to allow for proper joint functioning as indicated by patients Functional Deficits.    3. Patient will demonstrate an increase in Strength to good proximal hip and core activation to allow for proper functional mobility as indicated by patients Functional Deficits. 4. Patient will be able to fully participate in cardiac rehab without increased symptoms or restriction. 5. Patient will be able to stand for 30 minutes without an increase in pain or N/T.  (patient specific functional goal)    Progression Towards Functional goals:  [] Patient is progressing as expected towards functional goals listed. [] Progression is slowed due to complexities listed. [] Progression has been slowed due to co-morbidities.   [x] Plan just implemented, too soon to assess goals progression  [] Other:     ASSESSMENT:  See eval    Treatment/Activity Tolerance:  [x] Patient tolerated treatment well [] Patient limited by fatique  [] Patient limited by pain  [] Patient limited by other medical complications  [] Other:     Prognosis: [] Good [x] Fair  [] Poor    Patient Requires Follow-up: [x] Yes  [] No    PLAN: See eval  [] Continue per plan of care [] Alter current plan (see comments)  [x] Plan of care initiated [] Hold pending MD visit [] Discharge    Electronically signed by: Nikita Pickard, PTDPT [id number]

## 2017-12-07 ENCOUNTER — HOSPITAL ENCOUNTER (OUTPATIENT)
Dept: PHYSICAL THERAPY | Age: 82
Discharge: HOME | End: 2017-12-07

## 2017-12-07 NOTE — FLOWSHEET NOTE
[facility], [address] [place]  Phone: [phone]  Fax [phone]    Physical Therapy Daily Treatment Note  Date:  2017    Patient Name:  Chloé Bennett    :  1935  MRN: [id number]  Restrictions/Precautions:    Medical/Treatment Diagnosis Information:  · Diagnosis: Lumbar OA with radiculopathy (M47.27), Cervical spine stenosis (M48.02), Cervical spine OA (M47.22)  · Treatment Diagnosis: Low back pain (M54.5), Neck pain (F93.2)  Insurance/Certification information:  PT Insurance Information: /[employer]  Physician Information:  Referring Practitioner: Lydia Brain of care signed (Y/N):     Date of Patient follow up with Physician:     G-Code (if applicable):      Date G-Code Applied:    PT G-Codes  Functional Assessment Tool Used: Modified Oswestry  Score: 32%  Functional Limitation: Changing and maintaining body position  Changing and Maintaining Body Position Current Status (): At least 20 percent but less than 40 percent impaired, limited or restricted  Changing and Maintaining Body Position Goal Status (): At least 1 percent but less than 20 percent impaired, limited or restricted    Progress Note: []  Yes  []  No  Next due by: Visit #10      Latex Allergy:  [x]NO      []YES  Preferred Language for Healthcare:   [x]English       []other:     Visit # Insurance Allowable Requires auth   3 Medicare    []no        []yes:     Pain level: 2/10 Arms/shoulder and legs    SUBJECTIVE:  Feels tight in the shoulder and arms but overall feels slightly improved. Low back feeling good today. Stiffness in main complaint.      OBJECTIVE:   Observation:   Test measurements:      RESTRICTIONS/PRECAUTIONS: Hx of B TKA, B INOCENCIO, lumbar spine surgery    Exercises/Interventions:     Therapeutic Ex sets/reps comments   Upper trap stretch B 15\" x 3 Gentle, needs cueing   Scap squeezes 5\" x 15 Needs cueing   No $ 5\" x 15 kinesthetic sense, posture, motor skill, proprioception of core, proximal hip and LE for self care, mobility, lifting, and ambulation      Manual Treatments:  PROM / STM / Oscillations-Mobs:  G-I, II, III, IV (PA's, Inf., Post.)  [] (02772) Provided manual therapy to mobilize proximal hip and LS spine soft tissue/joints for the purpose of modulating pain, promoting relaxation,  increasing ROM, reducing/eliminating soft tissue swelling/inflammation/restriction, improving soft tissue extensibility and allowing for proper ROM for normal function with self care, mobility, lifting and ambulation. Modalities:   PM/HP x 15' to cervical spine; HP x 15' to lumbar spine while seated    Charges:  Timed Code Treatment Minutes: 38   Total Treatment Minutes: 53     [] EVAL (LOW) 88728 (typically 20 minutes face-to-face)  [] EVAL (MOD) 19662 (typically 30 minutes face-to-face)  [] EVAL (HIGH) 47958 (typically 45 minutes face-to-face)  [] RE-EVAL     [x] SS(24811) x  2   [] IONTO  [] NMR (67220) x      [] VASO  [x] Manual (00053) x  1    [] Other:  [] TA x       [] Mech Traction (96205)  [] ES(attended) (22196)      [x] ES (un) (94672):     Goals:   Short Term Goals: To be achieved in: 2 weeks  1. Independent in HEP and progression per patient tolerance, in order to prevent re-injury. 2. Patient will have a decrease in pain to facilitate improvement in movement, function, and ADLs as indicated by Functional Deficits. Long Term Goals: To be achieved in: 8 weeks  1. Disability index score of 16% or less for the Modfied Oswestry  to assist with reaching prior level of function. 2. Patient will demonstrate increased AROM to WNL, good LS mobility, good hip ROM to allow for proper joint functioning as indicated by patients Functional Deficits. 3. Patient will demonstrate an increase in Strength to good proximal hip and core activation to allow for proper functional mobility as indicated by patients Functional Deficits.    4. Patient will be able to fully participate in cardiac rehab without increased symptoms or restriction. 5. Patient will be able to stand for 30 minutes without an increase in pain or N/T.  (patient specific functional goal)    Progression Towards Functional goals:  [] Patient is progressing as expected towards functional goals listed. [] Progression is slowed due to complexities listed. [] Progression has been slowed due to co-morbidities.   [x] Plan just implemented, too soon to assess goals progression  [] Other:     ASSESSMENT:  See eval    Treatment/Activity Tolerance:  [x] Patient tolerated treatment well [] Patient limited by fatique  [] Patient limited by pain  [] Patient limited by other medical complications  [] Other:     Prognosis: [] Good [x] Fair  [] Poor    Patient Requires Follow-up: [x] Yes  [] No    PLAN: See eval  [x] Continue per plan of care [] Alter current plan (see comments)  [] Plan of care initiated [] Hold pending MD visit [] Discharge    Electronically signed by: Aura Lin, PTDPT [id number]

## 2017-12-12 ENCOUNTER — HOSPITAL ENCOUNTER (OUTPATIENT)
Dept: PHYSICAL THERAPY | Age: 82
Discharge: HOME | End: 2017-12-12

## 2017-12-12 NOTE — FLOWSHEET NOTE
[facility] [facility], [address] [place]  Phone: [phone]  Fax [phone]    ATHLETIC TRAINING [address]  Date:  2017    Patient Name:  Dionne Abreu    :  1935  MRN: [id number]  Restrictions/Precautions:    Medical/Treatment Diagnosis Information:  ·  Lumbar OA w/radiculopathy, cervical spine stenosis, OA  ·  LBP, neck pain  Physician Information:   Dr. May Munoz                                 Activity Log                                                               DOS/DOI:                                                             Date: 17   ATC Commun    Bike    Airdyne    Elliptical    Treadmill        Hamstring stretch    Quad stretch    Hip flexor stretch    Piriformis stretch    Gastroc stretch    Soleus stretch        Leg press  Bilat. Unilat. Knee ext. Knee flex. Squats   Mini 10x                  Wall                   BOSU        Step   Up               Up and Over               Lat. Down                   [facility]  Flex. stand march R/L 10x             ABd              ADd              Ext        Cable Column/Theraband    Rows seated GTB 2x10                                                 Ext. seated GTB 2x10                                                 Lat. pulldown seated RTB 2x10                                                 Chops                                                  Trunk Rot.         Modality ES/MHP neck, MHP LB 15'   Initials JLW

## 2017-12-12 NOTE — FLOWSHEET NOTE
NMR re-education                                 Therapeutic Exercise and NMR EXR  [x] (94884) Provided verbal/tactile cueing for activities related to strengthening, flexibility, endurance, ROM  for improvements in proximal hip and core control with self care, mobility, lifting and ambulation.  [] (12248) Provided verbal/tactile cueing for activities related to improving balance, coordination, kinesthetic sense, posture, motor skill, proprioception  to assist with core control in self care, mobility, lifting, and ambulation. Therapeutic Activities:    [] (39250 or 49999) Provided verbal/tactile cueing for activities related to improving balance, coordination, kinesthetic sense, posture, motor skill, proprioception and motor activation to allow for proper function  with self care and ADLs  [] (53773) Provided training and instruction to the patient for proper core and proximal hip recruitment and positioning with ambulation re-education     Home Exercise Program:    [x] (58479) Reviewed/Progressed HEP activities related to strengthening, flexibility, endurance, ROM of core, proximal hip and LE for functional self-care, mobility, lifting and ambulation   [] (27599) Reviewed/Progressed HEP activities related to improving balance, coordination, kinesthetic sense, posture, motor skill, proprioception of core, proximal hip and LE for self care, mobility, lifting, and ambulation      Manual Treatments:  PROM / STM / Oscillations-Mobs:  G-I, II, III, IV (PA's, Inf., Post.)  [x] (50620) Provided manual therapy to mobilize proximal hip and LS spine soft tissue/joints for the purpose of modulating pain, promoting relaxation,  increasing ROM, reducing/eliminating soft tissue swelling/inflammation/restriction, improving soft tissue extensibility and allowing for proper ROM for normal function with self care, mobility, lifting and ambulation.      Modalities:   PM/HP x 15' to cervical spine; HP x 15' to lumbar spine while seated    Charges:  Timed Code Treatment Minutes: 40   Total Treatment Minutes: 55     [] EVAL (LOW) 50737 (typically 20 minutes face-to-face)  [] EVAL (MOD) 97101 (typically 30 minutes face-to-face)  [] EVAL (HIGH) 04981 (typically 45 minutes face-to-face)  [] RE-EVAL     [x] QM(47033) x  2   [] IONTO  [] NMR (66613) x      [] VASO  [x] Manual (28404) x  1    [] Other:  [] TA x       [] Mech Traction (24750)  [] ES(attended) (73845)      [x] ES (un) (40453):     Goals:   Short Term Goals: To be achieved in: 2 weeks  1. Independent in HEP and progression per patient tolerance, in order to prevent re-injury. 2. Patient will have a decrease in pain to facilitate improvement in movement, function, and ADLs as indicated by Functional Deficits. Long Term Goals: To be achieved in: 8 weeks  1. Disability index score of 16% or less for the Modfied Oswestry  to assist with reaching prior level of function. 2. Patient will demonstrate increased AROM to WNL, good LS mobility, good hip ROM to allow for proper joint functioning as indicated by patients Functional Deficits. 3. Patient will demonstrate an increase in Strength to good proximal hip and core activation to allow for proper functional mobility as indicated by patients Functional Deficits. 4. Patient will be able to fully participate in cardiac rehab without increased symptoms or restriction. 5. Patient will be able to stand for 30 minutes without an increase in pain or N/T.  (patient specific functional goal)    Progression Towards Functional goals:  [] Patient is progressing as expected towards functional goals listed. [] Progression is slowed due to complexities listed. [] Progression has been slowed due to co-morbidities.   [x] Plan just implemented, too soon to assess goals progression  [] Other:     ASSESSMENT:  See eval    Treatment/Activity Tolerance:  [x] Patient tolerated treatment well [] Patient limited by fatique  [] Patient limited by

## 2017-12-14 ENCOUNTER — HOSPITAL ENCOUNTER (OUTPATIENT)
Dept: PHYSICAL THERAPY | Age: 82
Discharge: HOME | End: 2017-12-14

## 2017-12-14 NOTE — FLOWSHEET NOTE
5\" x 15 Needs cueing   No $ RD   2 x 10  Needs cueing   Supine chin tucks 10 x 5\"    Supine cervical rotation with OP 10 x     Supine pec stretch 3 x 20\"     Use staircase due to decreased balance  Experienced dizziness following ex, /74, 91 O2, 94 HR   LTR 20 x 5\" B    HL TA with ADD 5\" x 15    HL TA with Clamshells [place] 5\" x 15 GTB for home    Bridges 15 x 3\"    SB DKTC 20 x 5\"     Supine hip flex stretch 3 x 20\"  Manually    Pt ed  HEP, POC, posture, activity modification and easing back into cardiac rehab   TB rows Red 15 x  atc   Bike X 5 min               Manual Intervention      Manual HS stretch  3 x 20\" bilat    Hip flex/ knee flex X 3 min bilat     PROM shoulders X 3 min bilat    Man cervical traction 3 min                        NMR re-education                                 Therapeutic Exercise and NMR EXR  [x] (66031) Provided verbal/tactile cueing for activities related to strengthening, flexibility, endurance, ROM  for improvements in proximal hip and core control with self care, mobility, lifting and ambulation.  [] (01503) Provided verbal/tactile cueing for activities related to improving balance, coordination, kinesthetic sense, posture, motor skill, proprioception  to assist with core control in self care, mobility, lifting, and ambulation.      Therapeutic Activities:    [] (26304 or 91673) Provided verbal/tactile cueing for activities related to improving balance, coordination, kinesthetic sense, posture, motor skill, proprioception and motor activation to allow for proper function  with self care and ADLs  [] (51454) Provided training and instruction to the patient for proper core and proximal hip recruitment and positioning with ambulation re-education     Home Exercise Program:    [x] (80357) Reviewed/Progressed HEP activities related to strengthening, flexibility, endurance, ROM of core, proximal hip and LE for functional self-care, mobility, lifting and ambulation   [] (31080) for proper functional mobility as indicated by patients Functional Deficits. 4. Patient will be able to fully participate in cardiac rehab without increased symptoms or restriction. 5. Patient will be able to stand for 30 minutes without an increase in pain or N/T.  (patient specific functional goal)    Progression Towards Functional goals:  [] Patient is progressing as expected towards functional goals listed. [] Progression is slowed due to complexities listed. [] Progression has been slowed due to co-morbidities. [x] Plan just implemented, too soon to assess goals progression  [] Other:     ASSESSMENT:  See eval    Treatment/Activity Tolerance:  [x] Patient tolerated treatment well [] Patient limited by fatique  [] Patient limited by pain  [] Patient limited by other medical complications  [] Other:     Prognosis: [] Good [x] Fair  [] Poor    Patient Requires Follow-up: [x] Yes  [] No    PLAN: See eval  [x] Continue per plan of care [] Alter current plan (see comments)  [] Plan of care initiated [] Hold pending MD visit [] Discharge    Electronically signed by: Kacy Connor, PTDPT [id number]    MEDICARE CAP [address] [place] [address]  I certify that this patient meets one of the below criteria necessary for becoming an exception to the Medicare cap on therapy services:     []  The patient has a condition identified by an ICD-9 code that has a direct and significant impact on the need for therapy. (Significantly impacts the rate of recovery.)                                    []  The patient has a complexity identified by an ICD-9 code that has a direct and significant impact on the need for therapy. (Significantly impacts the rate of recovery and is associated with a primary condition.)                               []  The patient has associated variables that influence the amount of treatment to include:  Social support, self-efficacy/motivation, prognosis, time since onset/acuity.          [x]  The

## 2017-12-21 ENCOUNTER — HOSPITAL ENCOUNTER (OUTPATIENT)
Dept: PHYSICAL THERAPY | Age: 82
Discharge: HOME | End: 2017-12-21

## 2017-12-21 NOTE — FLOWSHEET NOTE
[facility] [address] [place]  Phone: [phone]  Fax [phone]    ATHLETIC TRAINING [address]  Date:  2017    Patient Name:  Vanesa Nunez    :  1935  MRN: [id number]  Restrictions/Precautions:    Medical/Treatment Diagnosis Information:  ·  Lumbar OA w/radiculopathy, cervical spine stenosis, OA  ·  LBP, neck pain  Physician Information:   Dr. Lester De La Torre                                                               DOS/DOI:                                                             Date: 17   ATC Commun   Feeling a little \"rough\" today, had an early Dr. Nay Young      Elliptical      Treadmill            Hamstring stretch      Quad stretch      Hip flexor stretch      Piriformis stretch      Gastroc stretch      Soleus stretch            Leg press  Bilat. Unilat. Knee ext. Knee flex. Squats   Mini 10x 10x 12x                  Wall                     [facility]            Step   Up                 Up and Over                 Lat. Down                       [facility]  Flex. stand march R/L 10x stand march R/L 10x stand march R/L 15x             ABd                ADd                Ext            Cable Column/Theraband    Rows seated GTB 2x10 seated GTB 2x10 seated GTB 3x10                                                 Ext. seated GTB 2x10 seated GTB 2x10 seated GTB 3x10                                                 Lat. pulldown seated RTB 2x10 seated RTB 2x10 seated GTB 2x10                                                 Chops                                                    Trunk Rot.             Modality ES/MHP neck, MHP LB 15' ES/MHP neck, MHP LB 15' ES/MHP neck, MHP LB 15'   [facility]

## 2017-12-21 NOTE — FLOWSHEET NOTE
activities related to improving balance, coordination, kinesthetic sense, posture, motor skill, proprioception of core, proximal hip and LE for self care, mobility, lifting, and ambulation      Manual Treatments:  PROM / STM / Oscillations-Mobs:  G-I, II, III, IV (PA's, Inf., Post.)  [x] (70262) Provided manual therapy to mobilize proximal hip and LS spine soft tissue/joints for the purpose of modulating pain, promoting relaxation,  increasing ROM, reducing/eliminating soft tissue swelling/inflammation/restriction, improving soft tissue extensibility and allowing for proper ROM for normal function with self care, mobility, lifting and ambulation. Modalities:   PM/HP x 15' to cervical spine; HP x 15' to lumbar spine while seated    Charges:  Timed Code Treatment Minutes: 40   Total Treatment Minutes: 55     [] EVAL (LOW) 44379 (typically 20 minutes face-to-face)  [] EVAL (MOD) 15856 (typically 30 minutes face-to-face)  [] EVAL (HIGH) 30245 (typically 45 minutes face-to-face)  [] RE-EVAL     [x] KS(54812) x  2   [] IONTO  [] NMR (77751) x      [] VASO  [x] Manual (78007) x  1    [] Other:  [] TA x       [] Mech Traction (87441)  [] ES(attended) (43240)      [x] ES (un) (86976):     Goals:   Short Term Goals: To be achieved in: 2 weeks  1. Independent in HEP and progression per patient tolerance, in order to prevent re-injury. 2. Patient will have a decrease in pain to facilitate improvement in movement, function, and ADLs as indicated by Functional Deficits. Long Term Goals: To be achieved in: 8 weeks  1. Disability index score of 16% or less for the Modfied Oswestry  to assist with reaching prior level of function. 2. Patient will demonstrate increased AROM to WNL, good LS mobility, good hip ROM to allow for proper joint functioning as indicated by patients Functional Deficits.    3. Patient will demonstrate an increase in Strength to good proximal hip and core activation to allow for proper functional mobility as indicated by patients Functional Deficits. 4. Patient will be able to fully participate in cardiac rehab without increased symptoms or restriction. 5. Patient will be able to stand for 30 minutes without an increase in pain or N/T.  (patient specific functional goal)    Progression Towards Functional goals:  [] Patient is progressing as expected towards functional goals listed. [] Progression is slowed due to complexities listed. [] Progression has been slowed due to co-morbidities. [x] Plan just implemented, too soon to assess goals progression  [] Other:     ASSESSMENT:  See eval    Treatment/Activity Tolerance:  [x] Patient tolerated treatment well [] Patient limited by fatique  [] Patient limited by pain  [] Patient limited by other medical complications  [] Other:     Prognosis: [] Good [x] Fair  [] Poor    Patient Requires Follow-up: [x] Yes  [] No    PLAN: See eval  [x] Continue per plan of care [] Alter current plan (see comments)  [] Plan of care initiated [] Hold pending MD visit [] Discharge    Electronically signed by: Balwinder Obrien, PTDPT [id number]    MEDICARE CAP [address] [place] [address]  I certify that this patient meets one of the below criteria necessary for becoming an exception to the Medicare cap on therapy services:     []  The patient has a condition identified by an ICD-9 code that has a direct and significant impact on the need for therapy. (Significantly impacts the rate of recovery.)                                    []  The patient has a complexity identified by an ICD-9 code that has a direct and significant impact on the need for therapy. (Significantly impacts the rate of recovery and is associated with a primary condition.)                               []  The patient has associated variables that influence the amount of treatment to include:  Social support, self-efficacy/motivation, prognosis, time since onset/acuity.          [x]  The patient has

## 2017-12-27 ENCOUNTER — HOSPITAL ENCOUNTER (OUTPATIENT)
Dept: PHYSICAL THERAPY | Age: 82
Discharge: HOME | End: 2017-12-27

## 2017-12-27 NOTE — FLOWSHEET NOTE
Hx of B TKA, B INOCENCIO, lumbar spine surgery    Exercises/Interventions:     See ATC    Therapeutic Ex sets/reps comments   Upper trap stretch B 15\" x 3 Gentle, needs cueing   Scap squeezes 5\" x 15 Needs cueing   No $ RD   2 x 10  Needs cueing   Supine chin tucks 10 x 5\"    Supine cervical rotation with OP 10 x     Supine pec stretch 3 x 20\"     Use staircase due to decreased balance  Experienced dizziness following ex, /74, 91 O2, 94 HR   LTR 20 x 5\" B    HL TA with ADD 5\" x 15    HL TA with [employer] [place] 5\" x 15 GTB for home    Bridges     SB DKTC 20 x 5\"     Supine hip flex stretch 3 x 20\"  Manually    Pt ed  HEP, POC, posture, activity modification and easing back into cardiac rehab   atc   Bike X 5 min               Manual Intervention      Manual HS stretch  3 x 20\" bilat    Hip flex/ knee flex X 3 min bilat     PROM shoulders X 3 min bilat    Man cervical traction 3 min                        NMR re-education                                 Therapeutic Exercise and NMR EXR  [x] (05078) Provided verbal/tactile cueing for activities related to strengthening, flexibility, endurance, ROM  for improvements in proximal hip and core control with self care, mobility, lifting and ambulation.  [] (83250) Provided verbal/tactile cueing for activities related to improving balance, coordination, kinesthetic sense, posture, motor skill, proprioception  to assist with core control in self care, mobility, lifting, and ambulation.      Therapeutic Activities:    [] (98439 or 72048) Provided verbal/tactile cueing for activities related to improving balance, coordination, kinesthetic sense, posture, motor skill, proprioception and motor activation to allow for proper function  with self care and ADLs  [] (21738) Provided training and instruction to the patient for proper core and proximal hip recruitment and positioning with ambulation re-education     Home Exercise Program:    [x] (59592) Reviewed/Progressed HEP activities functioning as indicated by patients Functional Deficits. 3. Patient will demonstrate an increase in Strength to good proximal hip and core activation to allow for proper functional mobility as indicated by patients Functional Deficits. 4. Patient will be able to fully participate in cardiac rehab without increased symptoms or restriction. 5. Patient will be able to stand for 30 minutes without an increase in pain or N/T.  (patient specific functional goal)    Progression Towards Functional goals:  [] Patient is progressing as expected towards functional goals listed. [] Progression is slowed due to complexities listed. [] Progression has been slowed due to co-morbidities. [x] Plan just implemented, too soon to assess goals progression  [] Other:     ASSESSMENT:  See eval    Treatment/Activity Tolerance:  [x] Patient tolerated treatment well [] Patient limited by fatique  [] Patient limited by pain  [] Patient limited by other medical complications  [] Other:     Prognosis: [] Good [x] Fair  [] Poor    Patient Requires Follow-up: [x] Yes  [] No    PLAN: See eval  [x] Continue per plan of care [] Alter current plan (see comments)  [] Plan of care initiated [] Hold pending MD visit [] Discharge    Electronically signed by: Celina Johnson, PTDPT [id number]    MEDICARE CAP [address] [place] [address]  I certify that this patient meets one of the below criteria necessary for becoming an exception to the Medicare cap on therapy services:     []  The patient has a condition identified by an ICD-9 code that has a direct and significant impact on the need for therapy. (Significantly impacts the rate of recovery.)                                    []  The patient has a complexity identified by an ICD-9 code that has a direct and significant impact on the need for therapy.   (Significantly impacts the rate of recovery and is associated with a primary condition.)                               []  The patient has associated variables that influence the amount of treatment to include:  Social support, self-efficacy/motivation, prognosis, time since onset/acuity. [x]  The patient has generalized musculoskeletal conditions or a condition affecting multiple sites that will have a direct impact on the rate of recovery.     [x]  The patient had a prior episode of outpatient therapy during this calendar year for a different condition. []  The patient has a mental or cognitive disorder in addition to the condition being treated that will have a direct and significant impact on the rate of recovery.

## 2017-12-29 ENCOUNTER — HOSPITAL ENCOUNTER (OUTPATIENT)
Dept: PHYSICAL THERAPY | Age: 82
Discharge: HOME | End: 2017-12-29

## 2017-12-29 NOTE — FLOWSHEET NOTE
Needs cueing   Supine chin tucks 10 x 5\"    Supine cervical rotation with OP 10 x     Supine pec stretch 3 x 20\"     Use staircase due to decreased balance  Experienced dizziness following ex, /74, 91 O2, 94 HR   LTR 20 x 5\" B    HL TA with ADD 5\" x 15    HL TA with [employer] [place] 5\" x 15 GTB for home    Bridges     SB DKTC 20 x 5\"     Supine hip flex stretch 3 x 20\"  Manually    Pt ed  HEP, POC, posture, activity modification and easing back into cardiac rehab   atc   Bike X 5 min               Manual Intervention      Manual HS stretch  3 x 20\" bilat    Hip flex/ knee flex X 3 min bilat        Man cervical traction, cervical stretches / STM 5 min                        NMR re-education                                 Therapeutic Exercise and NMR EXR  [x] (59757) Provided verbal/tactile cueing for activities related to strengthening, flexibility, endurance, ROM  for improvements in proximal hip and core control with self care, mobility, lifting and ambulation.  [] (34742) Provided verbal/tactile cueing for activities related to improving balance, coordination, kinesthetic sense, posture, motor skill, proprioception  to assist with core control in self care, mobility, lifting, and ambulation.      Therapeutic Activities:    [] (58165 or 20146) Provided verbal/tactile cueing for activities related to improving balance, coordination, kinesthetic sense, posture, motor skill, proprioception and motor activation to allow for proper function  with self care and ADLs  [] (22202) Provided training and instruction to the patient for proper core and proximal hip recruitment and positioning with ambulation re-education     Home Exercise Program:    [x] (90363) Reviewed/Progressed HEP activities related to strengthening, flexibility, endurance, ROM of core, proximal hip and LE for functional self-care, mobility, lifting and ambulation   [] (87834) Reviewed/Progressed HEP activities related to improving balance, coordination, kinesthetic sense, posture, motor skill, proprioception of core, proximal hip and LE for self care, mobility, lifting, and ambulation      Manual Treatments:  PROM / STM / Oscillations-Mobs:  G-I, II, III, IV (PA's, Inf., Post.)  [x] (45912) Provided manual therapy to mobilize proximal hip and LS spine soft tissue/joints for the purpose of modulating pain, promoting relaxation,  increasing ROM, reducing/eliminating soft tissue swelling/inflammation/restriction, improving soft tissue extensibility and allowing for proper ROM for normal function with self care, mobility, lifting and ambulation. Modalities:   PM/HP x 15' to cervical spine; HP x 15' to lumbar spine while seated    Charges:  Timed Code Treatment Minutes: 40   Total Treatment Minutes: 55     [] EVAL (LOW) 29941 (typically 20 minutes face-to-face)  [] EVAL (MOD) 48603 (typically 30 minutes face-to-face)  [] EVAL (HIGH) 79397 (typically 45 minutes face-to-face)  [] RE-EVAL     [x] ZZ(32716) x  2   [] IONTO  [] NMR (50403) x      [] VASO  [x] Manual (96742) x  1    [] Other:  [] TA x       [] Mech Traction (91368)  [] ES(attended) (55470)      [x] ES (un) (41546):     Goals:   Short Term Goals: To be achieved in: 2 weeks  1. Independent in HEP and progression per patient tolerance, in order to prevent re-injury. 2. Patient will have a decrease in pain to facilitate improvement in movement, function, and ADLs as indicated by Functional Deficits. Long Term Goals: To be achieved in: 8 weeks  1. Disability index score of 16% or less for the Modfied Oswestry  to assist with reaching prior level of function. 2. Patient will demonstrate increased AROM to WNL, good LS mobility, good hip ROM to allow for proper joint functioning as indicated by patients Functional Deficits.    3. Patient will demonstrate an increase in Strength to good proximal hip and core activation to allow for proper functional mobility as indicated by patients Functional affecting multiple sites that will have a direct impact on the rate of recovery.     [x]  The patient had a prior episode of outpatient therapy during this calendar year for a different condition. []  The patient has a mental or cognitive disorder in addition to the condition being treated that will have a direct and significant impact on the rate of recovery.

## 2017-12-29 NOTE — FLOWSHEET NOTE
[facility], [address] [place] [address] [place]  Phone: [phone]  Fax [phone]    ATHLETIC TRAINING [address]  Date:  2017    Patient Name:  Karla Sands    :  1935  MRN: [id number]  Restrictions/Precautions:    Medical/Treatment Diagnosis Information:  ·  Lumbar OA w/radiculopathy, cervical spine stenosis, OA  ·  LBP, neck pain  Physician Information:   Dr. Markos Saldivar                                                               DOS/DOI:                                                             Date: 17   ATC Commun   Feeling a little \"rough\" today, had an early Dr. Trinity Davidson       Elliptical       Treadmill              Hamstring stretch       Quad stretch       Hip flexor stretch       Piriformis stretch       Gastroc stretch       Soleus stretch              Leg press  Bilat. Unilat. Knee ext. Knee flex. Squats   Mini 10x 10x 12x 12 x                   Wall                      BOSU              Step   Up                  Up and Over                  Lat. Down                         [facility]  Flex. stand march R/L 10x stand march R/L 10x stand march R/L 15x              ABd                 ADd                 Ext              Cable Column/Theraband    Rows seated GTB 2x10 seated GTB 2x10 seated GTB 3x10 seated GTB 3x10                                                 Ext. seated GTB 2x10 seated GTB 2x10 seated GTB 3x10 seated GTB 3x10                                                 Lat. pulldown seated RTB 2x10 seated RTB 2x10 seated GTB 2x10 seated GTB 2 x10                                                 Chops                                                     Trunk Rot.               Modality ES/MHP neck, MHP LB 15' ES/MHP neck, MHP

## 2018-01-01 ENCOUNTER — HOSPITAL ENCOUNTER (OUTPATIENT)
Dept: OTHER | Age: 83
Discharge: HOME | End: 2018-01-31
Attending: INTERNAL MEDICINE | Admitting: INTERNAL MEDICINE

## 2018-01-01 ENCOUNTER — HOSPITAL ENCOUNTER (OUTPATIENT)
Dept: PHYSICAL THERAPY | Age: 83
Discharge: HOME | End: 2018-01-31

## 2018-01-03 ENCOUNTER — HOSPITAL ENCOUNTER (OUTPATIENT)
Dept: PHYSICAL THERAPY | Age: 83
Discharge: HOME | End: 2018-01-03

## 2018-01-05 ENCOUNTER — HOSPITAL ENCOUNTER (OUTPATIENT)
Dept: PHYSICAL THERAPY | Age: 83
Discharge: HOME | End: 2018-01-05

## 2018-01-05 NOTE — FLOWSHEET NOTE
[facility], [address] [place] [address] [place]  Phone: [phone]  Fax [phone]    ATHLETIC TRAINING [address]  Date:  2018    Patient Name:  Eula Newman    :  1935  MRN: [id number]  Restrictions/Precautions:    Medical/Treatment Diagnosis Information:  ·  Lumbar OA w/radiculopathy, cervical spine stenosis, OA  ·  LBP, neck pain  Physician Information:   Dr. Archie Lloyd                                                               DOS/DOI:                                                             Date: 17   ATC Commun Feeling a little \"rough\" today, had an early Dr. Damion Milner      Elliptical      Treadmill            Hamstring stretch      Quad stretch      Hip flexor stretch      Piriformis stretch      Gastroc stretch      Soleus stretch            Leg press  Bilat. Unilat. Knee ext. Knee flex. Squats   Mini 12x 12 x  15x                  Wall                     BOSU            Step   Up                 Up and Over                 Lat. Down                       [facility]  Flex. stand march R/L 15x  Airex march R/L 15x             ABd                ADd                Ext            Cable Column/Theraband    Rows seated GTB 3x10 seated GTB 3x10 seated GTB 3x10                                                 Ext. seated GTB 3x10 seated GTB 3x10 seated GTB 3x10                                                 Lat. pulldown seated GTB 2x10 seated GTB 2 x10 seated GTB 3x10                                                 Chops                                                    Trunk Rot.             Modality ES/MHP neck, MHP LB 15' ES/MHP neck, MHP LB 15' ES/MHP neck, MHP LB 15'   Initials JLW Performed with PT - TLG CANDIDO

## 2018-01-11 ENCOUNTER — HOSPITAL ENCOUNTER (OUTPATIENT)
Dept: PHYSICAL THERAPY | Age: 83
Discharge: HOME | End: 2018-01-11

## 2018-01-11 NOTE — PLAN OF CARE
[facility], [address]  Phone: [phone]  Fax [phone]     Physical Therapy Re-Certification Plan of Care    Dear  Dr. Cameron Gaviria,    We had the pleasure of treating the following patient for physical therapy services at [address] [place] [address]. A summary of our findings can be found in the updated assessment below. This includes our plan of care. If you have any questions or concerns regarding these findings, please do not hesitate to contact me at the office phone number checked above. Thank you for the referral.     Physician Signature:________________________________Date:__________________  By signing above, therapists plan is approved by physician      Patient: Brice Cushing   : 1935   MRN: [id number]  Referring Physician:        Evaluation Date: 2018      Medical Diagnosis Information:  · Diagnosis: Lumbar OA with radiculopathy (M47.27), Cervical spine stenosis (M48.02), Cervical spine OA (M47.22)  · Treatment Diagnosis: Low back pain (M54.5), Neck pain (O69.5)  Insurance/Certification information:  PT Insurance Information: /[employer]  Date Range:17 to 18  Total visits:10      G-Codes: (if applicable) PT G-Codes  Functional Assessment Tool Used: Modified Oswestry  Score: 48%  Functional Limitation: Changing and maintaining body position  Changing and Maintaining Body Position Current Status (): At least 40 percent but less than 60 percent impaired, limited or restricted  Changing and Maintaining Body Position Goal Status (): At least 20 percent but less than 40 percent impaired, limited or restricted   Functional Index used:    SUBJECTIVE: Stiffness persist daily. Pain has decreased. Lower legs and spine less bothersome.   Scheduled to follow up for cervical epidural.     Current Pain Scale: 3/10    Type: -Constant  (stiffness) -Intermitment

## 2018-01-11 NOTE — FLOWSHEET NOTE
[facility], [address] [place]  Phone: [phone]  Fax [phone]    Physical Therapy Daily Treatment Note  Date:  2018    Patient Name:  Luis Felipe Gutierrez    :  1935  MRN: [id number]  Restrictions/Precautions:    Medical/Treatment Diagnosis Information:  · Diagnosis: Lumbar OA with radiculopathy (M47.27), Cervical spine stenosis (M48.02), Cervical spine OA (M47.22)  · Treatment Diagnosis: Low back pain (M54.5), Neck pain (C14.3)  Insurance/Certification information:  PT Insurance Information: /[employer]  Physician Information:  Referring Practitioner: Emily Pelayo of care signed (Y/N):     Date of Patient follow up with Physician:     G-Code (if applicable):      Date G-Code Applied:    PT G-Codes  Functional Assessment Tool Used: Modified Oswestry  Score: 32%  Functional Limitation: Changing and maintaining body position  Changing and Maintaining Body Position Current Status (): At least 20 percent but less than 40 percent impaired, limited or restricted  Changing and Maintaining Body Position Goal Status (): At least 1 percent but less than 20 percent impaired, limited or restricted    Progress Note: []  Yes  []  No  Next due by: Visit #10      Latex Allergy:  [x]NO      []YES  Preferred Language for Healthcare:   [x]English       []other:     Visit # Insurance Allowable Requires auth   11 Medicare    []no        []yes:     Pain level:  5/10 Arms/shoulder and legs     SUBJECTIVE:  Patient states increase numbness / tingling in bilateral hands and feet today. Has felt good the rest of the week. Unsure of any activity to cause the change. Reports weakness in the hands.      OBJECTIVE:   Observation:        RESTRICTIONS/PRECAUTIONS: Hx of B TKA, B INOCENCIO, lumbar spine surgery    Exercises/Interventions:     See ATC    Therapeutic Ex sets/reps comments   Upper trap stretch B 15\" x 3 Gentle, needs

## 2018-01-16 ENCOUNTER — HOSPITAL ENCOUNTER (OUTPATIENT)
Dept: PHYSICAL THERAPY | Age: 83
Discharge: HOME | End: 2018-01-16

## 2018-01-16 NOTE — FLOWSHEET NOTE
[facility], [address]  Phone: [phone]  Fax [phone]    Physical Therapy Daily Treatment Note  Date:  2018    Patient Name:  Brandy Sanabria    :  1935  MRN: [id number]  Restrictions/Precautions:    Medical/Treatment Diagnosis Information:  · Diagnosis: Lumbar OA with radiculopathy (M47.27), Cervical spine stenosis (M48.02), Cervical spine OA (M47.22)  · Treatment Diagnosis: Low back pain (M54.5), Neck pain (H94.8)  Insurance/Certification information:  PT Insurance Information: /[employer]  Physician Information:  Referring Practitioner: Keshia Abbasicel of care signed (Y/N):     Date of Patient follow up with Physician:     G-Code (if applicable):      Date G-Code Applied:    PT G-Codes  Functional Assessment Tool Used: Modified Oswestry  Score: 32%  Functional Limitation: Changing and maintaining body position  Changing and Maintaining Body Position Current Status (): At least 20 percent but less than 40 percent impaired, limited or restricted  Changing and Maintaining Body Position Goal Status (): At least 1 percent but less than 20 percent impaired, limited or restricted    Progress Note: []  Yes  []  No  Next due by: Visit #10      Latex Allergy:  [x]NO      []YES  Preferred Language for Healthcare:   [x]English       []other:     Visit # Insurance Allowable Requires auth   12 Medicare    []no        []yes:     Pain level:  5/10 Arms/shoulder and legs     SUBJECTIVE:  Pt's left wrist is sore and swollen. Pt thought the pain was from his neck, but he may have OA in his left shoulder.      OBJECTIVE:   Observation:        RESTRICTIONS/PRECAUTIONS: Hx of B TKA, B INOECNCIO, lumbar spine surgery    Exercises/Interventions:     See ATC    Therapeutic Ex sets/reps comments   Upper trap stretch B 15\" x 3 Gentle, needs cueing   Scap squeezes 5\" x 15 Needs cueing   No $  RD   2 x 10   hold due to patients Functional Deficits. 4. Patient will be able to fully participate in cardiac rehab without increased symptoms or restriction. 5. Patient will be able to stand for 30 minutes without an increase in pain or N/T.  (patient specific functional goal)    Progression Towards Functional goals:  [] Patient is progressing as expected towards functional goals listed. [] Progression is slowed due to complexities listed. [] Progression has been slowed due to co-morbidities. [x] Plan just implemented, too soon to assess goals progression  [] Other:     ASSESSMENT:  See eval    Treatment/Activity Tolerance:  [x] Patient tolerated treatment well [] Patient limited by fatique  [] Patient limited by pain  [] Patient limited by other medical complications  [] Other:     Prognosis: [] Good [x] Fair  [] Poor    Patient Requires Follow-up: [x] Yes  [] No    PLAN: See eval  [x] Continue per plan of care [] Alter current plan (see comments)  [] Plan of care initiated [] Hold pending MD visit [] Discharge    Electronically signed by: Galindo Louis, PTDPT [id number]    MEDICARE CAP [address] [place] [address]  I certify that this patient meets one of the below criteria necessary for becoming an exception to the Medicare cap on therapy services:     []  The patient has a condition identified by an ICD-9 code that has a direct and significant impact on the need for therapy. (Significantly impacts the rate of recovery.)                                    []  The patient has a complexity identified by an ICD-9 code that has a direct and significant impact on the need for therapy. (Significantly impacts the rate of recovery and is associated with a primary condition.)                               []  The patient has associated variables that influence the amount of treatment to include:  Social support, self-efficacy/motivation, prognosis, time since onset/acuity.          [x]  The patient has generalized musculoskeletal

## 2018-01-18 ENCOUNTER — HOSPITAL ENCOUNTER (OUTPATIENT)
Dept: PHYSICAL THERAPY | Age: 83
Discharge: HOME | End: 2018-01-18

## 2018-01-18 NOTE — FLOWSHEET NOTE
[facility],  [address] [place]  Phone: [phone]  Fax [phone]    Physical Therapy Daily Treatment Note  Date:  2018    Patient Name:  Laura Izquierdo    :  1935  MRN: [id number]  Restrictions/Precautions:    Medical/Treatment Diagnosis Information:  · Diagnosis: Lumbar OA with radiculopathy (M47.27), Cervical spine stenosis (M48.02), Cervical spine OA (M47.22)  · Treatment Diagnosis: Low back pain (M54.5), Neck pain (H35.9)  Insurance/Certification information:  PT Insurance Information: /Humana  Physician Information:  Referring Practitioner: Kane Kerr of care signed (Y/N):     Date of Patient follow up with Physician:     G-Code (if applicable):      Date G-Code Applied:    PT G-Codes  Functional Assessment Tool Used: Modified Oswestry  Score: 32%  Functional Limitation: Changing and maintaining body position  Changing and Maintaining Body Position Current Status (): At least 20 percent but less than 40 percent impaired, limited or restricted  Changing and Maintaining Body Position Goal Status (): At least 1 percent but less than 20 percent impaired, limited or restricted    Progress Note: []  Yes  []  No  Next due by: Visit #10      Latex Allergy:  [x]NO      []YES  Preferred Language for Healthcare:   [x]English       []other:     Visit # Insurance Allowable Requires auth   13 Medicare    []no        []yes:     Pain level:  0.5/10 Arms/shoulder and legs     SUBJECTIVE: See MD Monday about epidural injections for neck. Feeling good today besides wrist swollen and sore.      OBJECTIVE:   Observation:        RESTRICTIONS/PRECAUTIONS: Hx of B TKA, B INOCENCIO, lumbar spine surgery    Exercises/Interventions:     See ATC    Therapeutic Ex sets/reps comments   Upper trap stretch B 15\" x 3 Gentle, needs cueing   Scap squeezes \" x 15 Needs cueing   No $  RD 5  2 x 10   hold due to left wrist swelling/ pain. Supine chin tucks 10 x 5\"    Supine cervical rotation with OP 10 x     Supine pec stretch 3 x 20\"     Use staircase due to decreased balance  Experienced dizziness following ex, /74, 91 O2, 94 HR   LTR 20 x 5\" B    HL TA with ADD  5\" x 30    HL TA with Clamshells  SC 5\" x 30 GTB for home    Bridges     SB DKTC 20 x 5\"     Supine hip flex stretch 3 x 20\"  Manually    Pt ed  HEP, POC, posture, activity modification and easing back into cardiac rehab   atc   Bike X 8 min     SAQ 2 x 10         Manual Intervention      Manual HS stretch  3 x 20\" bilat    Hip flex/ knee flex X 3 min bilat        Man cervical traction, cervical stretches / STM 5 min                        NMR re-education                                 Therapeutic Exercise and NMR EXR  [x] (54303) Provided verbal/tactile cueing for activities related to strengthening, flexibility, endurance, ROM  for improvements in proximal hip and core control with self care, mobility, lifting and ambulation.  [] (18068) Provided verbal/tactile cueing for activities related to improving balance, coordination, kinesthetic sense, posture, motor skill, proprioception  to assist with core control in self care, mobility, lifting, and ambulation.      Therapeutic Activities:    [] (79151 or 55294) Provided verbal/tactile cueing for activities related to improving balance, coordination, kinesthetic sense, posture, motor skill, proprioception and motor activation to allow for proper function  with self care and ADLs  [] (33858) Provided training and instruction to the patient for proper core and proximal hip recruitment and positioning with ambulation re-education     Home Exercise Program:    [x] (21897) Reviewed/Progressed HEP activities related to strengthening, flexibility, endurance, ROM of core, proximal hip and LE for functional self-care, mobility, lifting and ambulation   [] (11722) Reviewed/Progressed HEP activities related to improving

## 2018-01-18 NOTE — FLOWSHEET NOTE
3x10 20#                                                 Chops                                                     Trunk Rot.               Modality ES/MHP neck, MHP LB 15' ES/MHP neck, MHP LB 15' ES/MHP neck, MHP LB 15' ES/MHP neck, MHP LB 15'   Initials JLW JLW JLW FXT

## 2018-01-18 NOTE — OP NOTE
[facility], [address] [place] [address] [place]  Phone: [phone]  Fax [phone]    Date: 1/18/2018  Physician: Dr. Msua Chavez  Patient: Mariam Escobedo Diagnosis: Lumbar OA with radiculopathy (M47.27), Cervical spine stenosis (M48.02), Cervical spine OA (M47.22)    Patient has received 13 sessions of Physical Therapy over a 7 week period. Functional Questionnaire Score: Initial 32%, Current 48%  Pain reported has decreased from 1-5/10 to 0-3/10    ROM Initial (R) Initial (L) Current (R) Current (L)                                      Strength                                            Functional Activity Checklist: The patient continues to have moderate difficulty with the following:   [] Personal care  [] Reaching / overhead  [] Standing    [] Housework chores  [] Climbing  [] Driving / riding in a vehicle    [] Work  [] Squatting  [] Bed / vehicle mobility    [] Lifting  [] Walking  [] Sleeping    [] Pushing / pulling  [] Sitting  [] Concentrating / reading     Specific Functional Improvement and Impression:  Patients description of pain varies day to day. Overall pain decreased. Soreness in arms / shoulders persists. Noticing increase wrist swelling and pain. Working on cervical mobility along with lumbar mobility along with stabilization exercises. Met medical necessity of therapy along with holding Medicare dollars for further issues to come this year. Summary:   [] Patient is progressing as expected for this condition   [x] Patient is progressing, but slower than expected for this condition   [x] Patient is not progressing (varies day to day)  Clinician Recommendations:   [] Continue rehabilitation due to objective improvement and continued functional deficits. [] Follow up periodically to advance home exercise program to match level of function.    [] Continue rehabitation due to objective improvement and continued

## 2018-01-22 ENCOUNTER — OFFICE VISIT (OUTPATIENT)
Dept: ORTHOPEDIC SURGERY | Age: 83
End: 2018-01-22

## 2018-01-22 VITALS
BODY MASS INDEX: 37.37 KG/M2 | HEART RATE: 74 BPM | WEIGHT: 238.1 LBS | HEIGHT: 67 IN | SYSTOLIC BLOOD PRESSURE: 95 MMHG | DIASTOLIC BLOOD PRESSURE: 56 MMHG

## 2018-01-22 DIAGNOSIS — M48.02 CERVICAL STENOSIS OF SPINE: Primary | ICD-10-CM

## 2018-01-22 PROCEDURE — G8417 CALC BMI ABV UP PARAM F/U: HCPCS | Performed by: PHYSICAL MEDICINE & REHABILITATION

## 2018-01-22 PROCEDURE — 99214 OFFICE O/P EST MOD 30 MIN: CPT | Performed by: PHYSICAL MEDICINE & REHABILITATION

## 2018-01-22 PROCEDURE — G8484 FLU IMMUNIZE NO ADMIN: HCPCS | Performed by: PHYSICAL MEDICINE & REHABILITATION

## 2018-01-22 PROCEDURE — 1036F TOBACCO NON-USER: CPT | Performed by: PHYSICAL MEDICINE & REHABILITATION

## 2018-01-22 PROCEDURE — 4040F PNEUMOC VAC/ADMIN/RCVD: CPT | Performed by: PHYSICAL MEDICINE & REHABILITATION

## 2018-01-22 PROCEDURE — 1123F ACP DISCUSS/DSCN MKR DOCD: CPT | Performed by: PHYSICAL MEDICINE & REHABILITATION

## 2018-01-22 PROCEDURE — G8427 DOCREV CUR MEDS BY ELIG CLIN: HCPCS | Performed by: PHYSICAL MEDICINE & REHABILITATION

## 2018-01-22 NOTE — PROGRESS NOTES
height 5' 7.01\" (1.702 m), weight 238 lb 1.6 oz (108 kg). GENERAL EXAM:  · General Apparence: Patient is adequately groomed with no evidence of malnutrition. · Orientation: The patient is oriented to time, place and person. · Mood & Affect:The patient's mood and affect are appropriate   · Vascular: Examination reveals no swelling tenderness in upper or lower extremities. · Lymphatic: The lymphatic examination bilaterally reveals all areas to be without enlargement or induration  · Sensation: Sensation is intact without deficit  · Coordination/Balance: Good coordination     CERVICAL EXAMINATION:  · Inspection: Local inspection shows no step-off or bruising. Cervical alignment is normal.     · Palpation: No evidence of tenderness at the midline, and trapezius. Paraspinal tenderness is present. There is no step-off or paraspinal spasm. · Range of Motion:Moderate loss of flexion extension   · Strength: 5/5 bilateral upper extremities   · Special Tests:    ·   Spurling's, L'Hermitte's & Goetz's negative bilaterally. ·   Doran and Impingement tests are negative bilaterally. ·  Cubital and Carpal tunnel Tinel's negative bilaterally. · Skin:There are no rashes, ulcerations or lesions in right & left upper extremities. · Reflexes: Bilaterally triceps, biceps and brachioradialis are  trace. Clonus absent bilaterally at the feet. · Gait & station: Walks with a single point cane   · Additional Examinations:         · RIGHT UPPER EXTREMITY:  Inspection/examination of the right upper extremity does not show any tenderness, deformity or injury. Range of motion is full. There is no gross instability. There are no rashes, ulcerations or lesions. Strength and tone are normal.  · LEFT UPPER EXTREMITY: Inspection/examination of the left upper extremity does not show any tenderness, deformity or injury. Range of motion is full. There is no gross instability. There are no rashes, ulcerations or lesions. and visualized portions  of the posterior fossa appear normal. The spinal cord appears normal.  There is moderate disc height loss at C4-5, C5-6, and C6-7. There is  moderate multilevel facet and uncovertebral joint osteoarthritis. No  soft tissue abnormality is identified. Normal flow voids are identified  in the vertebral arteries. C2-3: There is no disc bulge. There is no spinal canal stenosis. There  is no neural foraminal stenosis. C3-4: There is diffuse disc bulge with superimposed left subarticular  disc protrusion abutting the cord and resulting in moderate to severe  spinal canal stenosis. There is moderate right and mild left neural  foraminal stenosis. C4-5: There is diffuse disc bulge resulting in severe spinal canal  stenosis. There is moderate to severe bilateral neural foraminal  stenosis. C5-6: There is diffuse disc bulge resulting in severe spinal canal  stenosis. There is moderate to severe bilateral neural foraminal  stenosis. C6-7: There is a right paracentral disc protrusion resulting in moderate  spinal canal stenosis. There is moderate right and mild left neural  foraminal stenosis. C7-T1: There is no disc bulge. There is no spinal canal stenosis. There  is no neural foraminal stenosis.     Impression:  #1 3 months cervical radiculitis from multilevel cervical stenosis with symptoms of numbness tenderness and shoulder pain without myelopathy symptoms    #2 history of lumbar decompression x2 January 2017 Dr. Emily Valdes, fall 2017 Dr. Praveen Morales:  #1 midline C7-T1 intralaminar cervical epidural, #1          F Anna Mcfarland

## 2018-01-23 ENCOUNTER — HOSPITAL ENCOUNTER (OUTPATIENT)
Dept: PHYSICAL THERAPY | Age: 83
Discharge: HOME | End: 2018-01-23

## 2018-01-23 ENCOUNTER — TELEPHONE (OUTPATIENT)
Dept: ORTHOPEDIC SURGERY | Age: 83
End: 2018-01-23

## 2018-01-23 NOTE — FLOWSHEET NOTE
has generalized musculoskeletal conditions or a condition affecting multiple sites that will have a direct impact on the rate of recovery.     [x]  The patient had a prior episode of outpatient therapy during this calendar year for a different condition. []  The patient has a mental or cognitive disorder in addition to the condition being treated that will have a direct and significant impact on the rate of recovery.

## 2018-01-29 ENCOUNTER — HOSPITAL ENCOUNTER (OUTPATIENT)
Dept: PAIN MANAGEMENT | Age: 83
Discharge: HOME | End: 2018-01-29
Attending: PHYSICAL MEDICINE & REHABILITATION | Admitting: PHYSICAL MEDICINE & REHABILITATION

## 2018-01-29 VITALS
OXYGEN SATURATION: 95 % | TEMPERATURE: 97.1 F | SYSTOLIC BLOOD PRESSURE: 125 MMHG | HEART RATE: 66 BPM | RESPIRATION RATE: 15 BRPM | BODY MASS INDEX: 37.35 KG/M2 | WEIGHT: 238 LBS | HEIGHT: 67 IN | DIASTOLIC BLOOD PRESSURE: 68 MMHG

## 2018-01-29 RX ORDER — LIDOCAINE HYDROCHLORIDE 10 MG/ML
INJECTION, SOLUTION EPIDURAL; INFILTRATION; INTRACAUDAL; PERINEURAL
Status: COMPLETED
Start: 2018-01-29 | End: 2018-01-29

## 2018-01-29 RX ORDER — SODIUM CHLORIDE, SODIUM LACTATE, POTASSIUM CHLORIDE, CALCIUM CHLORIDE 600; 310; 30; 20 MG/100ML; MG/100ML; MG/100ML; MG/100ML
INJECTION, SOLUTION INTRAVENOUS
Status: COMPLETED
Start: 2018-01-29 | End: 2018-01-29

## 2018-01-29 RX ORDER — SODIUM CHLORIDE, SODIUM LACTATE, POTASSIUM CHLORIDE, CALCIUM CHLORIDE 600; 310; 30; 20 MG/100ML; MG/100ML; MG/100ML; MG/100ML
INJECTION, SOLUTION INTRAVENOUS ONCE
Status: COMPLETED | OUTPATIENT
Start: 2018-01-29 | End: 2018-01-29

## 2018-01-29 RX ADMIN — SODIUM CHLORIDE, SODIUM LACTATE, POTASSIUM CHLORIDE, CALCIUM CHLORIDE: 600; 310; 30; 20 INJECTION, SOLUTION INTRAVENOUS at 08:22

## 2018-01-29 RX ADMIN — LIDOCAINE HYDROCHLORIDE 0.1 ML: 10 INJECTION, SOLUTION EPIDURAL; INFILTRATION; INTRACAUDAL; PERINEURAL at 08:22

## 2018-01-29 ASSESSMENT — PAIN DESCRIPTION - DESCRIPTORS: DESCRIPTORS: DULL;CONSTANT

## 2018-01-29 ASSESSMENT — PAIN - FUNCTIONAL ASSESSMENT: PAIN_FUNCTIONAL_ASSESSMENT: 0-10

## 2018-01-29 ASSESSMENT — PAIN SCALES - GENERAL: PAINLEVEL_OUTOF10: 0

## 2018-01-29 NOTE — POST SEDATION
Sedation Post Procedure Note    POST SEDATION ASSESSMENT      Patient:  Hoda Glasgow   :  1935  Medical Record No.:  [id number]   Date:  2018  Physician:  Kacey Marin M.D.       Patient location: Recovery  Level of consciousness: Awake, Alert, Oriented  Pain Control: Good  Respiration: Adequate  Post-op assessment: No sedation complications    Last Vitals:   Vitals:    18 0918   BP: 125/68   Pulse: 66   Resp: 15   Temp:    SpO2: 95%     Post-op Vitals: Stable    F Jamal Wild  10:52 AM

## 2018-01-29 NOTE — H&P
HISTORY AND PHYSICAL/PRE-SEDATION ASSESSMENT    Patient:  Ludmila Bowers   :  1935  Medical Record No.:  [id number]   Date:  2018  Physician:  Roxy Thornton M.D. Facility: [facility]     Nursing History and Physical reviewed and agreed upon. Additional findings:    Allergies:  Erythromycin; Penicillins; Sulfa antibiotics; Tetanus toxoids; and Tetracyclines & related    Home Medications:    Prior to Admission medications    Medication Sig Start Date End Date Taking? Authorizing Provider   traMADol (ULTRAM) 50 MG tablet Take 1 tabs PO q 6 hours PRN pain 7/10/17   Reji Chandler PA-C   lisinopril (PRINIVIL;ZESTRIL) 5 MG tablet Take 1 tablet by mouth daily 17   PABLO Kasper   metoprolol succinate (TOPROL XL) 50 MG extended release tablet Take 1 tablet by mouth daily 17   PABOL Kasper   VOLTAREN 1 % GEL APPLY 4 GRAMS TOPICALLY FOUR TIMES DAILY 17   F Yola Montgomery MD   peppermint oil liquid Take 1 capsule by mouth as needed (FOR STOMACH UPSET)     Historical Provider, MD   albuterol sulfate  (90 BASE) MCG/ACT inhaler Inhale 2 puffs into the lungs every 6 hours as needed for Wheezing    Historical Provider, MD   levothyroxine (SYNTHROID) 75 MCG tablet Take 75 mcg by mouth Daily    Historical Provider, MD   Vitamin D (CHOLECALCIFEROL) 1000 UNITS CAPS capsule Take 1,000 Units by mouth daily    Historical Provider, MD   aspirin 81 MG tablet Take 81 mg by mouth daily    Historical Provider, MD   diclofenac sodium 1 % GEL Apply 2 g topically as needed. Historical Provider, MD   atorvastatin (LIPITOR) 20 MG tablet Take 20 mg by mouth nightly. Historical Provider, MD   tamsulosin (FLOMAX) 0.4 MG capsule Take 0.4 mg by mouth 2 times daily. 11   Historical Provider, MD   finasteride (PROSCAR) 5 MG tablet Take 5 mg by mouth daily. Historical Provider, MD   zolpidem (AMBIEN) 10 MG tablet Take 10 mg by mouth nightly as needed.

## 2018-02-01 ENCOUNTER — HOSPITAL ENCOUNTER (OUTPATIENT)
Dept: PHYSICAL THERAPY | Age: 83
Discharge: HOME | End: 2018-02-02

## 2018-02-01 ENCOUNTER — HOSPITAL ENCOUNTER (OUTPATIENT)
Dept: OTHER | Age: 83
Discharge: HOME | End: 2018-02-28
Attending: INTERNAL MEDICINE | Admitting: INTERNAL MEDICINE

## 2018-02-01 ENCOUNTER — HOSPITAL ENCOUNTER (OUTPATIENT)
Dept: PHYSICAL THERAPY | Age: 83
Discharge: HOME | End: 2018-02-28

## 2018-02-01 NOTE — FLOWSHEET NOTE
[facility], [address]  Phone: [phone]  Fax [phone]    Physical Therapy Daily Treatment Note  Date:  2018    Patient Name:  Parker Lyons    :  1935  MRN: [id number]  Restrictions/Precautions:    Medical/Treatment Diagnosis Information:  · Diagnosis: Lumbar OA with radiculopathy (M47.27), Cervical spine stenosis (M48.02), Cervical spine OA (M47.22)  · Treatment Diagnosis: Low back pain (M54.5), Neck pain (J09.2)  Insurance/Certification information:  PT Insurance Information: /Hum[employer]  Physician Information:  Referring Practitioner: Santiago Ellis of care signed (Y/N):     Date of Patient follow up with Physician:     G-Code (if applicable):      Date G-Code Applied:    PT G-Codes  Functional Assessment Tool Used: Modified Oswestry  Score: 32%  Functional Limitation: Changing and maintaining body position  Changing and Maintaining Body Position Current Status (): At least 20 percent but less than 40 percent impaired, limited or restricted  Changing and Maintaining Body Position Goal Status (): At least 1 percent but less than 20 percent impaired, limited or restricted    Progress Note: []  Yes  []  No  Next due by: Visit #10      Latex Allergy:  [x]NO      []YES  Preferred Language for Healthcare:   [x]English       []other:     Visit # Insurance Allowable Requires auth   14 Medicare    []no        []yes:     Pain level:  6/10 Arms/shoulder and legs     SUBJECTIVE: Received injection Monday. Felt good x 1 day but then no change. Shoulders ache today. Shoulder and hands and wrist painful today. Quad tightness feels unchanged.      OBJECTIVE:   Observation:        RESTRICTIONS/PRECAUTIONS: Hx of B TKA, B INOCENCIO, lumbar spine surgery    Exercises/Interventions:     See ATC    Therapeutic Ex sets/reps comments   Upper trap stretch B Gentle, needs cueing   Scap squeezes Needs cueing   No $ proximal hip and LE for functional self-care, mobility, lifting and ambulation   [] (93140) Reviewed/Progressed HEP activities related to improving balance, coordination, kinesthetic sense, posture, motor skill, proprioception of core, proximal hip and LE for self care, mobility, lifting, and ambulation      Manual Treatments:  PROM / STM / Oscillations-Mobs:  G-I, II, III, IV (PA's, Inf., Post.)  [x] (22827) Provided manual therapy to mobilize proximal hip and LS spine soft tissue/joints for the purpose of modulating pain, promoting relaxation,  increasing ROM, reducing/eliminating soft tissue swelling/inflammation/restriction, improving soft tissue extensibility and allowing for proper ROM for normal function with self care, mobility, lifting and ambulation. Modalities:   PM/HP x 15' to cervical spine; HP x 15' to lumbar spine while seated    Charges:  Timed Code Treatment Minutes: 35   Total Treatment Minutes: 50     [] EVAL (LOW) 05720 (typically 20 minutes face-to-face)  [] EVAL (MOD) 35660 (typically 30 minutes face-to-face)  [] EVAL (HIGH) 78232 (typically 45 minutes face-to-face)  [] RE-EVAL     [x] PG(34140) x  1   [] IONTO  [] NMR (98233) x      [] VASO  [x] Manual (69826) x  1    [] Other:  [] TA x       [] Mech Traction (85570)  [] ES(attended) (60139)      [x] ES (un) (93483):     Goals:   Short Term Goals: To be achieved in: 2 weeks  1. Independent in HEP and progression per patient tolerance, in order to prevent re-injury. 2. Patient will have a decrease in pain to facilitate improvement in movement, function, and ADLs as indicated by Functional Deficits. Long Term Goals: To be achieved in: 8 weeks  1. Disability index score of 16% or less for the Modfied Oswestry  to assist with reaching prior level of function. 2. Patient will demonstrate increased AROM to WNL, good LS mobility, good hip ROM to allow for proper joint functioning as indicated by patients Functional Deficits.    3. Patient will demonstrate an increase in Strength to good proximal hip and core activation to allow for proper functional mobility as indicated by patients Functional Deficits. 4. Patient will be able to fully participate in cardiac rehab without increased symptoms or restriction. 5. Patient will be able to stand for 30 minutes without an increase in pain or N/T.  (patient specific functional goal)    Progression Towards Functional goals:  [] Patient is progressing as expected towards functional goals listed. [x] Progression is slowed due to complexities listed. [] Progression has been slowed due to co-morbidities. [] Plan just implemented, too soon to assess goals progression  [] Other:     ASSESSMENT:  See eval    Treatment/Activity Tolerance:  [x] Patient tolerated treatment well [] Patient limited by fatique  [] Patient limited by pain  [] Patient limited by other medical complications  [] Other:     Prognosis: [] Good [x] Fair  [] Poor    Patient Requires Follow-up: [x] Yes  [] No    PLAN: See eval  [x] Continue per plan of care [] Alter current plan (see comments)  [] Plan of care initiated [] Hold pending MD visit [] Discharge    Electronically signed by: Madonna Vivas PT    MEDICARE CAP [address] [place] [address]  I certify that this patient meets one of the below criteria necessary for becoming an exception to the Medicare cap on therapy services:     []  The patient has a condition identified by an ICD-9 code that has a direct and significant impact on the need for therapy. (Significantly impacts the rate of recovery.)                                    []  The patient has a complexity identified by an ICD-9 code that has a direct and significant impact on the need for therapy.   (Significantly impacts the rate of recovery and is associated with a primary condition.)                               []  The patient has associated variables that influence the amount of treatment to include:  Social support, self-efficacy/motivation, prognosis, time since onset/acuity. [x]  The patient has generalized musculoskeletal conditions or a condition affecting multiple sites that will have a direct impact on the rate of recovery.     [x]  The patient had a prior episode of outpatient therapy during this calendar year for a different condition. []  The patient has a mental or cognitive disorder in addition to the condition being treated that will have a direct and significant impact on the rate of recovery.

## 2018-02-01 NOTE — FLOWSHEET NOTE
Lat. pulldown SB GTB 3x10 SB 3x10 20# SB 3x12 20# Chair 30# 3x10                                                 Chops                                                     Trunk Rot.              1-on-1 time with PTA    20'   Modality ES/MHP neck, MHP LB 15' ES/MHP neck, MHP LB 15' ES/MHP neck, MHP LB 15 ES/MHP neck, MHP LB 15'   Initials JLW FXT JLW / BCB KRT

## 2018-02-08 ENCOUNTER — HOSPITAL ENCOUNTER (OUTPATIENT)
Dept: PHYSICAL THERAPY | Age: 83
Discharge: HOME | End: 2018-02-09

## 2018-02-08 NOTE — FLOWSHEET NOTE
[facility], [address] [place]  Phone: [phone]  Fax [phone]    Physical Therapy Daily Treatment Note  Date:  2018    Patient Name:  Roshan Bello    :  1935  MRN: [id number]  Restrictions/Precautions:    Medical/Treatment Diagnosis Information:  · Diagnosis: Lumbar OA with radiculopathy (M47.27), Cervical spine stenosis (M48.02), Cervical spine OA (M47.22)  · Treatment Diagnosis: Low back pain (M54.5), Neck pain (F40.9)  Insurance/Certification information:  PT Insurance Information: /Humana  Physician Information:  Referring Practitioner: Eryn Pulido of care signed (Y/N):     Date of Patient follow up with Physician:     G-Code (if applicable):      Date G-Code Applied:    PT G-Codes  Functional Assessment Tool Used: Modified Oswestry  Score: 32%  Functional Limitation: Changing and maintaining body position  Changing and Maintaining Body Position Current Status (): At least 20 percent but less than 40 percent impaired, limited or restricted  Changing and Maintaining Body Position Goal Status (): At least 1 percent but less than 20 percent impaired, limited or restricted    Progress Note: []  Yes  []  No  Next due by: Visit #10      Latex Allergy:  [x]NO      []YES  Preferred Language for Healthcare:   [x]English       []other:     Visit # Insurance Allowable Requires auth   14 Medicare    []no        []yes:     Pain level:  6/10 Arms/shoulder and legs     SUBJECTIVE: Received injection Monday. Felt good x 1 day but then no change. Shoulders ache today. Shoulder and hands and wrist painful today. Quad tightness feels unchanged.      OBJECTIVE:   Observation:        RESTRICTIONS/PRECAUTIONS: Hx of B TKA, B INOCENCIO, lumbar spine surgery    Exercises/Interventions:     See ATC 18    Therapeutic Ex sets/reps comments   Upper trap stretch B Gentle, needs cueing   Scap squeezes Needs cueing include:  Social support, self-efficacy/motivation, prognosis, time since onset/acuity. [x]  The patient has generalized musculoskeletal conditions or a condition affecting multiple sites that will have a direct impact on the rate of recovery.     [x]  The patient had a prior episode of outpatient therapy during this calendar year for a different condition. []  The patient has a mental or cognitive disorder in addition to the condition being treated that will have a direct and significant impact on the rate of recovery.

## 2018-02-12 ENCOUNTER — OFFICE VISIT (OUTPATIENT)
Dept: ORTHOPEDIC SURGERY | Age: 83
End: 2018-02-12

## 2018-02-12 VITALS
HEIGHT: 67 IN | DIASTOLIC BLOOD PRESSURE: 77 MMHG | HEART RATE: 102 BPM | BODY MASS INDEX: 37.37 KG/M2 | SYSTOLIC BLOOD PRESSURE: 115 MMHG | WEIGHT: 238.1 LBS

## 2018-02-12 DIAGNOSIS — M48.02 CERVICAL STENOSIS OF SPINAL CANAL: Primary | ICD-10-CM

## 2018-02-12 PROCEDURE — 4040F PNEUMOC VAC/ADMIN/RCVD: CPT | Performed by: PHYSICAL MEDICINE & REHABILITATION

## 2018-02-12 PROCEDURE — 1123F ACP DISCUSS/DSCN MKR DOCD: CPT | Performed by: PHYSICAL MEDICINE & REHABILITATION

## 2018-02-12 PROCEDURE — 99213 OFFICE O/P EST LOW 20 MIN: CPT | Performed by: PHYSICAL MEDICINE & REHABILITATION

## 2018-02-12 PROCEDURE — G8427 DOCREV CUR MEDS BY ELIG CLIN: HCPCS | Performed by: PHYSICAL MEDICINE & REHABILITATION

## 2018-02-12 PROCEDURE — G8417 CALC BMI ABV UP PARAM F/U: HCPCS | Performed by: PHYSICAL MEDICINE & REHABILITATION

## 2018-02-12 PROCEDURE — G8484 FLU IMMUNIZE NO ADMIN: HCPCS | Performed by: PHYSICAL MEDICINE & REHABILITATION

## 2018-02-12 PROCEDURE — 1036F TOBACCO NON-USER: CPT | Performed by: PHYSICAL MEDICINE & REHABILITATION

## 2018-02-12 RX ORDER — NAPROXEN 500 MG/1
TABLET ORAL
Refills: 0 | Status: ON HOLD | COMMUNITY
Start: 2017-11-22 | End: 2018-08-18 | Stop reason: HOSPADM

## 2018-02-12 NOTE — PROGRESS NOTES
Follow up Injection: SPINE    CHIEF COMPLAINT:    Chief Complaint   Patient presents with    Neck Pain     F/u DANIA       HISTORY OF PRESENT ILLNESS:                The patient is a [de-identified] y.o. male here to follow up 1st cervical epidural for cervical stenosis 3 months worsening bilateral arm shooting pain numbness weakness in the hands and tetanus sensations. Overall 50% better. Symptoms less frequent less constant and less severe. He has no new difficulty with ambulation he is scheduled to see Dr. Doris Agarwal in neurosurgery at [facility] tomorrow      Pain Assessment  Location of Pain: Neck  Location Modifiers: Left, Right  Severity of Pain: 3  Quality of Pain: Aching (weakness)  Duration of Pain: Persistent  Frequency of Pain: Constant  Limiting Behavior: Yes  Relieving Factors: Nsaids, Heat  Result of Injury: No  Work-Related Injury: No  Are there other pain locations you wish to document?: No      The post injection form was reviewed & scanned into the medical record today. Past/Current Treatment:   PT: Ongoing  Chiro:   Meds:   Injection:9/13/16: Ming L4-5 TX SKY  11/1/16: Ming L3-4 TX SKY  12/13/16: Ming L2-3 TX SKY  1/29/18 Midline C7/T1 DANIA  Sx:     Post injection side Effects: 1. Headache: no              2.Cramping:  no    3. Fever/Chills: no            4.  Other: no    Past Medical History: Medical history form was reviewed & scanned into the chart until Media tab  Past Medical History:   Diagnosis Date    Arthritis     Asthma     BPH     CAD (coronary artery disease)     Chest pain     Hearing decreased     HEARING AIDS    Hyperlipidemia     Hypertension     Need for post exposure prophylaxis for rabies 1980    Thyroid disease     hypothyroidism    Wears glasses         REVIEW OF SYSTEMS:   CONSTITUTIONAL: Denies unexplained weight loss, fevers, chills or fatigue  NEUROLOGIC: Denies tremors or seizures         PHYSICAL EXAM:    Vitals: Blood pressure 115/77, pulse 102, height 5' 7.01\" (1.702 m), weight 238 lb 1.6 oz (108 kg). GENERAL EXAM:  · General Apparence: Patient is adequately groomed with no evidence of malnutrition. · Orientation: The patient is oriented to time, place and person. · Mood & Affect:The patient's mood and affect are appropriate   · Vascular: Examination reveals no swelling tenderness in upper or lower extremities. · Lymphatic: The lymphatic examination bilaterally reveals all areas to be without enlargement or induration  · Sensation: Sensation is intact without deficit  · Coordination/Balance: Good coordination   CERVICAL EXAMINATION:  · Inspection: Local inspection shows no step-off or bruising. Cervical alignment is normal.     · Palpation: No evidence of tenderness at the midline, and trapezius. Paraspinal tenderness is present. There is no step-off or paraspinal spasm. · Range of Motion: He has moderate loss of flexion and extension  · Strength: 5/5 bilateral upper extremities   · Special Tests:    ·   Spurling's, L'Hermitte's & Goetz's negative bilaterally. ·   Doran and Impingement tests are negative bilaterally. ·  Cubital and Carpal tunnel Tinel's negative bilaterally. · Skin:There are no rashes, ulcerations or lesions in right & left upper extremities. · Reflexes: Bilaterally triceps, biceps and brachioradialis are trace. Clonus absent bilaterally at the feet. · Gait & station: Slow heel toe gait with single point cane   · Additional Examinations:       · RIGHT UPPER EXTREMITY:  Inspection/examination of the right upper extremity does not show any tenderness, deformity or injury. Range of motion is full. There is no gross instability. There are no rashes, ulcerations or lesions. Strength and tone are normal.  · LEFT UPPER EXTREMITY: Inspection/examination of the left upper extremity does not show any tenderness, deformity or injury. Range of motion is full. There is no gross instability. There are no rashes, ulcerations or lesions.

## 2018-02-16 ENCOUNTER — TELEPHONE (OUTPATIENT)
Dept: ORTHOPEDIC SURGERY | Age: 83
End: 2018-02-16

## 2018-02-21 ENCOUNTER — PAT TELEPHONE (OUTPATIENT)
Dept: PREADMISSION TESTING | Age: 83
End: 2018-02-21

## 2018-02-21 RX ORDER — SODIUM CHLORIDE, SODIUM LACTATE, POTASSIUM CHLORIDE, CALCIUM CHLORIDE 600; 310; 30; 20 MG/100ML; MG/100ML; MG/100ML; MG/100ML
INJECTION, SOLUTION INTRAVENOUS ONCE
Status: CANCELLED | OUTPATIENT
Start: 2018-02-26

## 2018-02-26 ENCOUNTER — HOSPITAL ENCOUNTER (OUTPATIENT)
Dept: PAIN MANAGEMENT | Age: 83
Discharge: HOME | End: 2018-02-26
Attending: PHYSICAL MEDICINE & REHABILITATION | Admitting: PHYSICAL MEDICINE & REHABILITATION

## 2018-02-26 VITALS
OXYGEN SATURATION: 96 % | RESPIRATION RATE: 16 BRPM | TEMPERATURE: 97.4 F | HEART RATE: 73 BPM | SYSTOLIC BLOOD PRESSURE: 113 MMHG | DIASTOLIC BLOOD PRESSURE: 61 MMHG

## 2018-02-26 RX ORDER — SODIUM CHLORIDE, SODIUM LACTATE, POTASSIUM CHLORIDE, CALCIUM CHLORIDE 600; 310; 30; 20 MG/100ML; MG/100ML; MG/100ML; MG/100ML
INJECTION, SOLUTION INTRAVENOUS ONCE
Status: COMPLETED | OUTPATIENT
Start: 2018-02-26 | End: 2018-02-26

## 2018-02-26 RX ORDER — MIDAZOLAM HYDROCHLORIDE 1 MG/ML
INJECTION INTRAMUSCULAR; INTRAVENOUS
Status: DISPENSED
Start: 2018-02-26 | End: 2018-02-26

## 2018-02-26 RX ADMIN — SODIUM CHLORIDE, SODIUM LACTATE, POTASSIUM CHLORIDE, CALCIUM CHLORIDE: 600; 310; 30; 20 INJECTION, SOLUTION INTRAVENOUS at 09:23

## 2018-02-26 ASSESSMENT — PAIN - FUNCTIONAL ASSESSMENT: PAIN_FUNCTIONAL_ASSESSMENT: 0-10

## 2018-02-26 NOTE — PROGRESS NOTES
NURSING CARE PLANS FOLLOWED     · Potential for anxiety-decrease anxiety-allow patient to verbalize  · Potential for infection-no infection-proper infection controls  · Potential for fall the patient will move to fall risk after procedure- through the recovery  phase   · [place] to environment  · Call light in reach  · Instruct to call for assistance prior to getting up  · Non skid footwear on   · Bed wheels locked and bed in lowest position - siderails up times two  · Potential for deep sedation-no deep sedation-know maximum allowable dose         adverse reactions and nursing considerations.  Assess VS and LOC

## 2018-02-26 NOTE — POST SEDATION
Sedation Post Procedure Note    POST SEDATION ASSESSMENT      Patient:  Gustavo Crump   :  1935  Medical Record No.:  [id number]   Date:  2018  Physician:  Austin Luque M.D.       Patient location: Recovery  Level of consciousness: Awake, Alert, Oriented  Pain Control: Good  Respiration: Adequate  Post-op assessment: No sedation complications    Last Vitals:   Vitals:    18 1022   BP: 113/61   Pulse: 73   Resp: 16   Temp:    SpO2: 96%     Post-op Vitals: Stable    F Jamal Wild  10:48 AM

## 2018-03-01 ENCOUNTER — HOSPITAL ENCOUNTER (OUTPATIENT)
Dept: OTHER | Age: 83
Discharge: HOME | End: 2018-03-31
Attending: INTERNAL MEDICINE | Admitting: INTERNAL MEDICINE

## 2018-03-01 ENCOUNTER — HOSPITAL ENCOUNTER (OUTPATIENT)
Dept: PHYSICAL THERAPY | Age: 83
Discharge: HOME | End: 2018-03-31

## 2018-03-12 ENCOUNTER — OFFICE VISIT (OUTPATIENT)
Dept: ORTHOPEDIC SURGERY | Age: 83
End: 2018-03-12

## 2018-03-12 VITALS
DIASTOLIC BLOOD PRESSURE: 62 MMHG | BODY MASS INDEX: 37.37 KG/M2 | HEART RATE: 81 BPM | WEIGHT: 238.1 LBS | HEIGHT: 67 IN | SYSTOLIC BLOOD PRESSURE: 114 MMHG

## 2018-03-12 DIAGNOSIS — R20.0 BILATERAL LEG NUMBNESS: Primary | ICD-10-CM

## 2018-03-12 DIAGNOSIS — M48.062 SPINAL STENOSIS OF LUMBAR REGION WITH NEUROGENIC CLAUDICATION: ICD-10-CM

## 2018-03-12 DIAGNOSIS — M50.30 DDD (DEGENERATIVE DISC DISEASE), CERVICAL: ICD-10-CM

## 2018-03-12 DIAGNOSIS — M51.36 DDD (DEGENERATIVE DISC DISEASE), LUMBAR: ICD-10-CM

## 2018-03-12 DIAGNOSIS — M54.12 CERVICAL RADICULITIS: ICD-10-CM

## 2018-03-12 PROCEDURE — 4040F PNEUMOC VAC/ADMIN/RCVD: CPT | Performed by: PHYSICIAN ASSISTANT

## 2018-03-12 PROCEDURE — 99214 OFFICE O/P EST MOD 30 MIN: CPT | Performed by: PHYSICIAN ASSISTANT

## 2018-03-12 PROCEDURE — G8427 DOCREV CUR MEDS BY ELIG CLIN: HCPCS | Performed by: PHYSICIAN ASSISTANT

## 2018-03-12 PROCEDURE — G8417 CALC BMI ABV UP PARAM F/U: HCPCS | Performed by: PHYSICIAN ASSISTANT

## 2018-03-12 PROCEDURE — G8484 FLU IMMUNIZE NO ADMIN: HCPCS | Performed by: PHYSICIAN ASSISTANT

## 2018-03-12 PROCEDURE — 1123F ACP DISCUSS/DSCN MKR DOCD: CPT | Performed by: PHYSICIAN ASSISTANT

## 2018-03-12 PROCEDURE — 1036F TOBACCO NON-USER: CPT | Performed by: PHYSICIAN ASSISTANT

## 2018-03-12 RX ORDER — PREGABALIN 75 MG/1
CAPSULE ORAL
Qty: 60 CAPSULE | Refills: 1 | Status: ON HOLD | OUTPATIENT
Start: 2018-03-12 | End: 2018-08-03 | Stop reason: HOSPADM

## 2018-03-12 NOTE — PROGRESS NOTES
tenderness, deformity or injury. Range of motion is full. There is no gross instability. There are no rashes, ulcerations or lesions. Strength and tone are normal.    Diagnostic Testing:   Lumbar MRI June 2017 surgical changes L3 4, L4 5, L4 5 spondylolisthesis causing severe central stenosis, varying degrees of foraminal stenosis. High signal right side of the sacrum--treated as stress fracture with Dr. Christine Parks reviewed 6/2017    11-17 cervical MRI films and report reviewed: [facility]:INDICATION: Neck pain, bilateral upper extremity radicular pain    COMPARISON: Radiograph dated 3/31/2014    TECHNIQUE: MRI of the cervical spine was performed without  contrast according to standard protocol. FINDINGS:    There is cervical kyphosis. There is 2 mm anterolisthesis of C3 on C4  and 2 mm anterolisthesis of C7 on T1. Vertebral bodies are normal in  height without evidence of compression fractures. Marrow signal  intensity is normal. The craniocervical junction and visualized portions  of the posterior fossa appear normal. The spinal cord appears normal.  There is moderate disc height loss at C4-5, C5-6, and C6-7. There is  moderate multilevel facet and uncovertebral joint osteoarthritis. No  soft tissue abnormality is identified. Normal flow voids are identified  in the vertebral arteries. C2-3: There is no disc bulge. There is no spinal canal stenosis. There  is no neural foraminal stenosis. C3-4: There is diffuse disc bulge with superimposed left subarticular  disc protrusion abutting the cord and resulting in moderate to severe  spinal canal stenosis. There is moderate right and mild left neural  foraminal stenosis. C4-5: There is diffuse disc bulge resulting in severe spinal canal  stenosis. There is moderate to severe bilateral neural foraminal  stenosis. C5-6: There is diffuse disc bulge resulting in severe spinal canal  stenosis.  There is moderate to severe bilateral neural

## 2018-03-15 ENCOUNTER — HOSPITAL ENCOUNTER (OUTPATIENT)
Dept: MRI IMAGING | Age: 83
Discharge: HOME | End: 2018-03-15
Attending: PHYSICIAN ASSISTANT | Admitting: PHYSICIAN ASSISTANT

## 2018-03-15 DIAGNOSIS — M48.062 SPINAL STENOSIS OF LUMBAR REGION WITH NEUROGENIC CLAUDICATION: ICD-10-CM

## 2018-03-15 DIAGNOSIS — M51.36 DDD (DEGENERATIVE DISC DISEASE), LUMBAR: ICD-10-CM

## 2018-03-15 DIAGNOSIS — R20.0 BILATERAL LEG NUMBNESS: ICD-10-CM

## 2018-03-15 LAB
ANION GAP SERPL CALCULATED.3IONS-SCNC: 14 MMOL/L (ref 3–16)
BUN BLDV-MCNC: 16 MG/DL (ref 7–20)
CALCIUM SERPL-MCNC: 8.8 MG/DL (ref 8.3–10.6)
CHLORIDE BLD-SCNC: 96 MMOL/L (ref 99–110)
CO2: 30 MMOL/L (ref 21–32)
CREAT SERPL-MCNC: 1 MG/DL (ref 0.8–1.3)
GFR AFRICAN AMERICAN: >60
GFR NON-AFRICAN AMERICAN: >60
GLUCOSE BLD-MCNC: 93 MG/DL (ref 70–99)
POTASSIUM SERPL-SCNC: 4.6 MMOL/L (ref 3.5–5.1)
SODIUM BLD-SCNC: 140 MMOL/L (ref 136–145)

## 2018-04-01 ENCOUNTER — HOSPITAL ENCOUNTER (OUTPATIENT)
Dept: OTHER | Age: 83
Discharge: HOME | End: 2018-04-30
Attending: INTERNAL MEDICINE | Admitting: INTERNAL MEDICINE

## 2018-04-12 ENCOUNTER — OFFICE VISIT (OUTPATIENT)
Dept: ORTHOPEDIC SURGERY | Age: 83
End: 2018-04-12

## 2018-04-12 DIAGNOSIS — M54.16 LUMBAR RADICULOPATHY: Primary | ICD-10-CM

## 2018-04-12 PROCEDURE — 95886 MUSC TEST DONE W/N TEST COMP: CPT | Performed by: PHYSICAL MEDICINE & REHABILITATION

## 2018-04-12 PROCEDURE — 95908 NRV CNDJ TST 3-4 STUDIES: CPT | Performed by: PHYSICAL MEDICINE & REHABILITATION

## 2018-04-16 ENCOUNTER — OFFICE VISIT (OUTPATIENT)
Dept: ORTHOPEDIC SURGERY | Age: 83
End: 2018-04-16

## 2018-04-16 VITALS
HEART RATE: 77 BPM | WEIGHT: 238.1 LBS | BODY MASS INDEX: 37.37 KG/M2 | DIASTOLIC BLOOD PRESSURE: 75 MMHG | SYSTOLIC BLOOD PRESSURE: 134 MMHG | HEIGHT: 67 IN

## 2018-04-16 DIAGNOSIS — M48.062 SPINAL STENOSIS OF LUMBAR REGION WITH NEUROGENIC CLAUDICATION: ICD-10-CM

## 2018-04-16 DIAGNOSIS — M54.16 LUMBAR RADICULITIS: Primary | ICD-10-CM

## 2018-04-16 DIAGNOSIS — M48.02 CERVICAL STENOSIS OF SPINAL CANAL: ICD-10-CM

## 2018-04-16 PROCEDURE — 4040F PNEUMOC VAC/ADMIN/RCVD: CPT | Performed by: PHYSICIAN ASSISTANT

## 2018-04-16 PROCEDURE — G8417 CALC BMI ABV UP PARAM F/U: HCPCS | Performed by: PHYSICIAN ASSISTANT

## 2018-04-16 PROCEDURE — G8427 DOCREV CUR MEDS BY ELIG CLIN: HCPCS | Performed by: PHYSICIAN ASSISTANT

## 2018-04-16 PROCEDURE — 1123F ACP DISCUSS/DSCN MKR DOCD: CPT | Performed by: PHYSICIAN ASSISTANT

## 2018-04-16 PROCEDURE — 1036F TOBACCO NON-USER: CPT | Performed by: PHYSICIAN ASSISTANT

## 2018-04-16 PROCEDURE — 99214 OFFICE O/P EST MOD 30 MIN: CPT | Performed by: PHYSICIAN ASSISTANT

## 2018-04-16 RX ORDER — PREGABALIN 100 MG/1
CAPSULE ORAL
Qty: 90 CAPSULE | Refills: 2 | Status: ON HOLD | OUTPATIENT
Start: 2018-04-16 | End: 2018-08-03 | Stop reason: HOSPADM

## 2018-05-01 ENCOUNTER — HOSPITAL ENCOUNTER (OUTPATIENT)
Dept: OTHER | Age: 83
Discharge: HOME | End: 2018-05-31
Attending: INTERNAL MEDICINE | Admitting: INTERNAL MEDICINE

## 2018-05-03 ENCOUNTER — OFFICE VISIT (OUTPATIENT)
Dept: ORTHOPEDIC SURGERY | Age: 83
End: 2018-05-03

## 2018-05-03 VITALS
DIASTOLIC BLOOD PRESSURE: 78 MMHG | HEART RATE: 78 BPM | SYSTOLIC BLOOD PRESSURE: 149 MMHG | BODY MASS INDEX: 37.37 KG/M2 | WEIGHT: 238.1 LBS | HEIGHT: 67 IN

## 2018-05-03 DIAGNOSIS — M48.062 LUMBAR STENOSIS WITH NEUROGENIC CLAUDICATION: Primary | ICD-10-CM

## 2018-05-03 PROCEDURE — 4040F PNEUMOC VAC/ADMIN/RCVD: CPT | Performed by: ORTHOPAEDIC SURGERY

## 2018-05-03 PROCEDURE — 1123F ACP DISCUSS/DSCN MKR DOCD: CPT | Performed by: ORTHOPAEDIC SURGERY

## 2018-05-03 PROCEDURE — G8427 DOCREV CUR MEDS BY ELIG CLIN: HCPCS | Performed by: ORTHOPAEDIC SURGERY

## 2018-05-03 PROCEDURE — 99213 OFFICE O/P EST LOW 20 MIN: CPT | Performed by: ORTHOPAEDIC SURGERY

## 2018-05-03 PROCEDURE — 1036F TOBACCO NON-USER: CPT | Performed by: ORTHOPAEDIC SURGERY

## 2018-05-03 PROCEDURE — G8417 CALC BMI ABV UP PARAM F/U: HCPCS | Performed by: ORTHOPAEDIC SURGERY

## 2018-06-01 ENCOUNTER — HOSPITAL ENCOUNTER (OUTPATIENT)
Dept: OTHER | Age: 83
Discharge: HOME | End: 2018-06-30
Attending: INTERNAL MEDICINE | Admitting: INTERNAL MEDICINE

## 2018-06-04 ENCOUNTER — OFFICE VISIT (OUTPATIENT)
Dept: ORTHOPEDIC SURGERY | Age: 83
End: 2018-06-04

## 2018-06-04 VITALS
SYSTOLIC BLOOD PRESSURE: 99 MMHG | HEART RATE: 79 BPM | DIASTOLIC BLOOD PRESSURE: 44 MMHG | WEIGHT: 238.1 LBS | BODY MASS INDEX: 37.37 KG/M2 | HEIGHT: 67 IN

## 2018-06-04 DIAGNOSIS — R29.898 HAND WEAKNESS: ICD-10-CM

## 2018-06-04 DIAGNOSIS — M48.02 CERVICAL STENOSIS OF SPINAL CANAL: ICD-10-CM

## 2018-06-04 DIAGNOSIS — G89.4 CHRONIC PAIN SYNDROME: Primary | ICD-10-CM

## 2018-06-04 DIAGNOSIS — M48.062 SPINAL STENOSIS OF LUMBAR REGION WITH NEUROGENIC CLAUDICATION: ICD-10-CM

## 2018-06-04 DIAGNOSIS — M54.16 LUMBAR RADICULITIS: ICD-10-CM

## 2018-06-04 PROCEDURE — 99214 OFFICE O/P EST MOD 30 MIN: CPT | Performed by: PHYSICIAN ASSISTANT

## 2018-06-04 PROCEDURE — 4040F PNEUMOC VAC/ADMIN/RCVD: CPT | Performed by: PHYSICIAN ASSISTANT

## 2018-06-04 PROCEDURE — G8427 DOCREV CUR MEDS BY ELIG CLIN: HCPCS | Performed by: PHYSICIAN ASSISTANT

## 2018-06-04 PROCEDURE — 1123F ACP DISCUSS/DSCN MKR DOCD: CPT | Performed by: PHYSICIAN ASSISTANT

## 2018-06-04 PROCEDURE — 1036F TOBACCO NON-USER: CPT | Performed by: PHYSICIAN ASSISTANT

## 2018-06-04 PROCEDURE — G8417 CALC BMI ABV UP PARAM F/U: HCPCS | Performed by: PHYSICIAN ASSISTANT

## 2018-06-04 RX ORDER — PREGABALIN 100 MG/1
100 CAPSULE ORAL 3 TIMES DAILY
Qty: 90 CAPSULE | Refills: 2 | Status: ON HOLD | OUTPATIENT
Start: 2018-06-04 | End: 2018-08-03 | Stop reason: HOSPADM

## 2018-06-04 RX ORDER — HYDROCODONE BITARTRATE AND ACETAMINOPHEN 5; 325 MG/1; MG/1
1 TABLET ORAL EVERY 6 HOURS PRN
Qty: 28 TABLET | Refills: 0 | Status: SHIPPED | OUTPATIENT
Start: 2018-06-04 | End: 2018-06-11

## 2018-06-08 ENCOUNTER — PAT TELEPHONE (OUTPATIENT)
Dept: PREADMISSION TESTING | Age: 83
End: 2018-06-08

## 2018-06-12 ENCOUNTER — HOSPITAL ENCOUNTER (OUTPATIENT)
Dept: PAIN MANAGEMENT | Age: 83
Discharge: HOME | End: 2018-06-12
Attending: PHYSICAL MEDICINE & REHABILITATION | Admitting: PHYSICAL MEDICINE & REHABILITATION

## 2018-06-12 VITALS
SYSTOLIC BLOOD PRESSURE: 132 MMHG | BODY MASS INDEX: 37.89 KG/M2 | HEART RATE: 55 BPM | WEIGHT: 250 LBS | RESPIRATION RATE: 16 BRPM | OXYGEN SATURATION: 97 % | HEIGHT: 68 IN | DIASTOLIC BLOOD PRESSURE: 66 MMHG | TEMPERATURE: 98.7 F

## 2018-06-12 ASSESSMENT — PAIN - FUNCTIONAL ASSESSMENT
PAIN_FUNCTIONAL_ASSESSMENT: 0-10
PAIN_FUNCTIONAL_ASSESSMENT: 0-10

## 2018-06-12 ASSESSMENT — PAIN DESCRIPTION - DESCRIPTORS: DESCRIPTORS: ACHING

## 2018-06-12 ASSESSMENT — ACTIVITIES OF DAILY LIVING (ADL): EFFECT OF PAIN ON DAILY ACTIVITIES: WALKING, STANDING

## 2018-06-21 ENCOUNTER — OFFICE VISIT (OUTPATIENT)
Dept: ORTHOPEDIC SURGERY | Age: 83
End: 2018-06-21

## 2018-06-21 DIAGNOSIS — M54.12 CERVICAL RADICULOPATHY: ICD-10-CM

## 2018-06-21 DIAGNOSIS — G56.03 BILATERAL CARPAL TUNNEL SYNDROME: Primary | ICD-10-CM

## 2018-06-21 PROCEDURE — 95910 NRV CNDJ TEST 7-8 STUDIES: CPT | Performed by: PHYSICAL MEDICINE & REHABILITATION

## 2018-06-21 PROCEDURE — 95886 MUSC TEST DONE W/N TEST COMP: CPT | Performed by: PHYSICAL MEDICINE & REHABILITATION

## 2018-06-26 ENCOUNTER — OFFICE VISIT (OUTPATIENT)
Dept: ORTHOPEDIC SURGERY | Age: 83
End: 2018-06-26

## 2018-06-26 VITALS
WEIGHT: 238 LBS | SYSTOLIC BLOOD PRESSURE: 128 MMHG | HEART RATE: 77 BPM | BODY MASS INDEX: 36.07 KG/M2 | HEIGHT: 68 IN | DIASTOLIC BLOOD PRESSURE: 74 MMHG

## 2018-06-26 DIAGNOSIS — G56.23 ULNAR NEUROPATHY OF BOTH UPPER EXTREMITIES: ICD-10-CM

## 2018-06-26 DIAGNOSIS — G89.4 CHRONIC PAIN SYNDROME: ICD-10-CM

## 2018-06-26 DIAGNOSIS — M54.12 CERVICAL RADICULOPATHY: Primary | ICD-10-CM

## 2018-06-26 DIAGNOSIS — M48.02 CERVICAL STENOSIS OF SPINAL CANAL: ICD-10-CM

## 2018-06-26 DIAGNOSIS — M48.062 SPINAL STENOSIS OF LUMBAR REGION WITH NEUROGENIC CLAUDICATION: ICD-10-CM

## 2018-06-26 PROCEDURE — G8427 DOCREV CUR MEDS BY ELIG CLIN: HCPCS | Performed by: PHYSICIAN ASSISTANT

## 2018-06-26 PROCEDURE — G8417 CALC BMI ABV UP PARAM F/U: HCPCS | Performed by: PHYSICIAN ASSISTANT

## 2018-06-26 PROCEDURE — 1036F TOBACCO NON-USER: CPT | Performed by: PHYSICIAN ASSISTANT

## 2018-06-26 PROCEDURE — 4040F PNEUMOC VAC/ADMIN/RCVD: CPT | Performed by: PHYSICIAN ASSISTANT

## 2018-06-26 PROCEDURE — 1123F ACP DISCUSS/DSCN MKR DOCD: CPT | Performed by: PHYSICIAN ASSISTANT

## 2018-06-26 PROCEDURE — 99214 OFFICE O/P EST MOD 30 MIN: CPT | Performed by: PHYSICIAN ASSISTANT

## 2018-07-01 ENCOUNTER — HOSPITAL ENCOUNTER (OUTPATIENT)
Dept: OTHER | Age: 83
Discharge: HOME | End: 2018-07-01
Attending: INTERNAL MEDICINE | Admitting: INTERNAL MEDICINE

## 2018-08-02 ENCOUNTER — APPOINTMENT (OUTPATIENT)
Dept: CT IMAGING | Age: 83
DRG: 682 | End: 2018-08-02
Payer: MEDICARE

## 2018-08-02 ENCOUNTER — HOSPITAL ENCOUNTER (INPATIENT)
Age: 83
LOS: 1 days | Discharge: HOME | DRG: 682 | End: 2018-08-03
Attending: EMERGENCY MEDICINE | Admitting: FAMILY MEDICINE
Payer: MEDICARE

## 2018-08-02 ENCOUNTER — APPOINTMENT (OUTPATIENT)
Dept: MRI IMAGING | Age: 83
DRG: 682 | End: 2018-08-02
Payer: MEDICARE

## 2018-08-02 DIAGNOSIS — Z96.642 STATUS POST TOTAL REPLACEMENT OF LEFT HIP: ICD-10-CM

## 2018-08-02 DIAGNOSIS — N17.9 AKI (ACUTE KIDNEY INJURY) (HCC): Primary | ICD-10-CM

## 2018-08-02 DIAGNOSIS — R27.0 ATAXIA: ICD-10-CM

## 2018-08-02 LAB
A/G RATIO: 1.5 (ref 1.1–2.2)
ALBUMIN SERPL-MCNC: 3.5 G/DL (ref 3.4–5)
ALP BLD-CCNC: 54 U/L (ref 40–129)
ALT SERPL-CCNC: 11 U/L (ref 10–40)
AMMONIA: 30 UMOL/L (ref 16–60)
AMORPHOUS: ABNORMAL /HPF
ANION GAP SERPL CALCULATED.3IONS-SCNC: 11 MMOL/L (ref 3–16)
AST SERPL-CCNC: 13 U/L (ref 15–37)
BASOPHILS ABSOLUTE: 0 K/UL (ref 0–0.2)
BASOPHILS RELATIVE PERCENT: 0.6 %
BILIRUB SERPL-MCNC: 0.9 MG/DL (ref 0–1)
BILIRUBIN URINE: NEGATIVE
BLOOD, URINE: ABNORMAL
BUN BLDV-MCNC: 32 MG/DL (ref 7–20)
CALCIUM SERPL-MCNC: 8 MG/DL (ref 8.3–10.6)
CHLORIDE BLD-SCNC: 104 MMOL/L (ref 99–110)
CHP ED QC CHECK: YES
CLARITY: CLEAR
CO2: 27 MMOL/L (ref 21–32)
COLOR: YELLOW
CREAT SERPL-MCNC: 1.6 MG/DL (ref 0.8–1.3)
EOSINOPHILS ABSOLUTE: 0.1 K/UL (ref 0–0.6)
EOSINOPHILS RELATIVE PERCENT: 1.4 %
EPITHELIAL CELLS, UA: ABNORMAL /HPF
GFR AFRICAN AMERICAN: 50
GFR NON-AFRICAN AMERICAN: 42
GLOBULIN: 2.3 G/DL
GLUCOSE BLD-MCNC: 104 MG/DL
GLUCOSE BLD-MCNC: 104 MG/DL (ref 70–99)
GLUCOSE BLD-MCNC: 113 MG/DL (ref 70–99)
GLUCOSE URINE: NEGATIVE MG/DL
HCT VFR BLD CALC: 38.5 % (ref 40.5–52.5)
HEMOGLOBIN: 12.9 G/DL (ref 13.5–17.5)
KETONES, URINE: NEGATIVE MG/DL
LEUKOCYTE ESTERASE, URINE: NEGATIVE
LYMPHOCYTES ABSOLUTE: 1.3 K/UL (ref 1–5.1)
LYMPHOCYTES RELATIVE PERCENT: 18.3 %
MCH RBC QN AUTO: 30.6 PG (ref 26–34)
MCHC RBC AUTO-ENTMCNC: 33.4 G/DL (ref 31–36)
MCV RBC AUTO: 91.6 FL (ref 80–100)
MICROSCOPIC EXAMINATION: YES
MONOCYTES ABSOLUTE: 0.9 K/UL (ref 0–1.3)
MONOCYTES RELATIVE PERCENT: 13.2 %
NEUTROPHILS ABSOLUTE: 4.6 K/UL (ref 1.7–7.7)
NEUTROPHILS RELATIVE PERCENT: 66.5 %
NITRITE, URINE: NEGATIVE
PDW BLD-RTO: 14.3 % (ref 12.4–15.4)
PERFORMED ON: ABNORMAL
PH UA: 6
PLATELET # BLD: 302 K/UL (ref 135–450)
PMV BLD AUTO: 8.2 FL (ref 5–10.5)
POTASSIUM SERPL-SCNC: 4.1 MMOL/L (ref 3.5–5.1)
PROTEIN UA: NEGATIVE MG/DL
RBC # BLD: 4.21 M/UL (ref 4.2–5.9)
RBC UA: ABNORMAL /HPF (ref 0–2)
SODIUM BLD-SCNC: 142 MMOL/L (ref 136–145)
SPECIFIC GRAVITY UA: 1.02
TOTAL PROTEIN: 5.8 G/DL (ref 6.4–8.2)
TSH REFLEX: 0.86 UIU/ML (ref 0.27–4.2)
URINE TYPE: ABNORMAL
UROBILINOGEN, URINE: 0.2 E.U./DL
WBC # BLD: 6.9 K/UL (ref 4–11)
WBC UA: ABNORMAL /HPF (ref 0–5)

## 2018-08-02 PROCEDURE — 84443 ASSAY THYROID STIM HORMONE: CPT

## 2018-08-02 PROCEDURE — 99285 EMERGENCY DEPT VISIT HI MDM: CPT

## 2018-08-02 PROCEDURE — 94150 VITAL CAPACITY TEST: CPT

## 2018-08-02 PROCEDURE — 94640 AIRWAY INHALATION TREATMENT: CPT

## 2018-08-02 PROCEDURE — 85025 COMPLETE CBC W/AUTO DIFF WBC: CPT

## 2018-08-02 PROCEDURE — 93005 ELECTROCARDIOGRAM TRACING: CPT | Performed by: EMERGENCY MEDICINE

## 2018-08-02 PROCEDURE — 6370000000 HC RX 637 (ALT 250 FOR IP): Performed by: FAMILY MEDICINE

## 2018-08-02 PROCEDURE — 93010 ELECTROCARDIOGRAM REPORT: CPT | Performed by: INTERNAL MEDICINE

## 2018-08-02 PROCEDURE — 80053 COMPREHEN METABOLIC PANEL: CPT

## 2018-08-02 PROCEDURE — 82140 ASSAY OF AMMONIA: CPT

## 2018-08-02 PROCEDURE — 96360 HYDRATION IV INFUSION INIT: CPT

## 2018-08-02 PROCEDURE — 1200000000 HC SEMI PRIVATE

## 2018-08-02 PROCEDURE — 2580000003 HC RX 258: Performed by: EMERGENCY MEDICINE

## 2018-08-02 PROCEDURE — 81001 URINALYSIS AUTO W/SCOPE: CPT

## 2018-08-02 PROCEDURE — 94664 DEMO&/EVAL PT USE INHALER: CPT

## 2018-08-02 PROCEDURE — 2580000003 HC RX 258: Performed by: FAMILY MEDICINE

## 2018-08-02 PROCEDURE — 36415 COLL VENOUS BLD VENIPUNCTURE: CPT

## 2018-08-02 PROCEDURE — 70551 MRI BRAIN STEM W/O DYE: CPT

## 2018-08-02 PROCEDURE — 70450 CT HEAD/BRAIN W/O DYE: CPT

## 2018-08-02 RX ORDER — LISINOPRIL 5 MG/1
5 TABLET ORAL DAILY
Status: DISCONTINUED | OUTPATIENT
Start: 2018-08-02 | End: 2018-08-02

## 2018-08-02 RX ORDER — METOPROLOL SUCCINATE 50 MG/1
50 TABLET, EXTENDED RELEASE ORAL DAILY
Status: DISCONTINUED | OUTPATIENT
Start: 2018-08-02 | End: 2018-08-03 | Stop reason: HOSPADM

## 2018-08-02 RX ORDER — FINASTERIDE 5 MG/1
5 TABLET, FILM COATED ORAL DAILY
Status: DISCONTINUED | OUTPATIENT
Start: 2018-08-02 | End: 2018-08-03 | Stop reason: HOSPADM

## 2018-08-02 RX ORDER — SODIUM CHLORIDE, SODIUM LACTATE, POTASSIUM CHLORIDE, AND CALCIUM CHLORIDE .6; .31; .03; .02 G/100ML; G/100ML; G/100ML; G/100ML
1000 INJECTION, SOLUTION INTRAVENOUS ONCE
Status: COMPLETED | OUTPATIENT
Start: 2018-08-02 | End: 2018-08-02

## 2018-08-02 RX ORDER — ONDANSETRON 2 MG/ML
4 INJECTION INTRAMUSCULAR; INTRAVENOUS EVERY 6 HOURS PRN
Status: DISCONTINUED | OUTPATIENT
Start: 2018-08-02 | End: 2018-08-03 | Stop reason: HOSPADM

## 2018-08-02 RX ORDER — TAMSULOSIN HYDROCHLORIDE 0.4 MG/1
0.4 CAPSULE ORAL 2 TIMES DAILY
Status: DISCONTINUED | OUTPATIENT
Start: 2018-08-02 | End: 2018-08-03 | Stop reason: HOSPADM

## 2018-08-02 RX ORDER — ATORVASTATIN CALCIUM 40 MG/1
20 TABLET, FILM COATED ORAL NIGHTLY
Status: DISCONTINUED | OUTPATIENT
Start: 2018-08-02 | End: 2018-08-03 | Stop reason: HOSPADM

## 2018-08-02 RX ORDER — LISINOPRIL 10 MG/1
10 TABLET ORAL DAILY
Status: DISCONTINUED | OUTPATIENT
Start: 2018-08-03 | End: 2018-08-03 | Stop reason: HOSPADM

## 2018-08-02 RX ORDER — SODIUM CHLORIDE 0.9 % (FLUSH) 0.9 %
10 SYRINGE (ML) INJECTION EVERY 12 HOURS SCHEDULED
Status: DISCONTINUED | OUTPATIENT
Start: 2018-08-02 | End: 2018-08-03 | Stop reason: HOSPADM

## 2018-08-02 RX ORDER — ALBUTEROL SULFATE 90 UG/1
2 AEROSOL, METERED RESPIRATORY (INHALATION) EVERY 6 HOURS PRN
Status: DISCONTINUED | OUTPATIENT
Start: 2018-08-02 | End: 2018-08-03 | Stop reason: HOSPADM

## 2018-08-02 RX ORDER — SODIUM CHLORIDE 0.9 % (FLUSH) 0.9 %
10 SYRINGE (ML) INJECTION PRN
Status: DISCONTINUED | OUTPATIENT
Start: 2018-08-02 | End: 2018-08-03 | Stop reason: HOSPADM

## 2018-08-02 RX ORDER — ASPIRIN 81 MG/1
81 TABLET ORAL DAILY
Status: DISCONTINUED | OUTPATIENT
Start: 2018-08-02 | End: 2018-08-03 | Stop reason: HOSPADM

## 2018-08-02 RX ORDER — PREGABALIN 75 MG/1
75 CAPSULE ORAL 2 TIMES DAILY
Status: DISCONTINUED | OUTPATIENT
Start: 2018-08-02 | End: 2018-08-03 | Stop reason: HOSPADM

## 2018-08-02 RX ORDER — HYDROCORTISONE 0.5 %
CREAM (GRAM) TOPICAL 3 TIMES DAILY PRN
Status: DISCONTINUED | OUTPATIENT
Start: 2018-08-02 | End: 2018-08-03 | Stop reason: HOSPADM

## 2018-08-02 RX ADMIN — PREGABALIN 75 MG: 75 CAPSULE ORAL at 20:39

## 2018-08-02 RX ADMIN — Medication 2 PUFF: at 20:25

## 2018-08-02 RX ADMIN — ATORVASTATIN CALCIUM 20 MG: 40 TABLET, FILM COATED ORAL at 20:39

## 2018-08-02 RX ADMIN — SODIUM CHLORIDE, PRESERVATIVE FREE 10 ML: 5 INJECTION INTRAVENOUS at 20:39

## 2018-08-02 RX ADMIN — TAMSULOSIN HYDROCHLORIDE 0.4 MG: 0.4 CAPSULE ORAL at 20:39

## 2018-08-02 RX ADMIN — SODIUM CHLORIDE, POTASSIUM CHLORIDE, SODIUM LACTATE AND CALCIUM CHLORIDE 1000 ML: 600; 310; 30; 20 INJECTION, SOLUTION INTRAVENOUS at 13:10

## 2018-08-02 ASSESSMENT — PAIN SCALES - GENERAL: PAINLEVEL_OUTOF10: 0

## 2018-08-02 NOTE — PROGRESS NOTES
PS hosp @ [id number]  RE: SERA, ataxia per Dr. Monty Nelson returned call @ 2039, spoke to Dr. Monty Rivera.

## 2018-08-02 NOTE — H&P
SURGERY      GROWTH ON FIRST FINGER REMOVED    FRACTURE SURGERY  age 14    nose    HIP SURGERY Left 04/19/2017    LEFT LATERAL TOTAL HIP REPLACEMENT WITH CELLSAVER AND    JOINT REPLACEMENT Right 2006    TKR    JOINT REPLACEMENT Left 11/2013    TKR    JOINT REPLACEMENT Right 2008    THR    LUMBAR LAMINECTOMY N/A 01/06/2017    micro lumbar laminectomy L2-L3       Medications Prior to Admission:      Prior to Admission medications    Medication Sig Start Date End Date Taking? Authorizing Provider   diclofenac sodium 1 % GEL APPLY 4 G TOPICALLY 4 TIMES DAILY 7/2/18 8/1/18  Maida Encinas PA-C   pregabalin (LYRICA) 100 MG capsule Take 1 capsule by mouth 3 times daily for 30 days. . 6/4/18 7/4/18  Maida Encinas PA-C   pregabalin (LYRICA) 100 MG capsule I po TID. 4/16/18 5/16/18  Maida Encinas PA-C   pregabalin (LYRICA) 75 MG capsule I po BID. 3/12/18 4/12/18  Maida Encinas PA-C   naproxen (NAPROSYN) 500 MG tablet TK 1 T PO BID WITH MEALS 11/22/17   Historical Provider, MD   diclofenac sodium (VOLTAREN) 1 % GEL Apply 4 g topically 4 times daily 1/29/18 2/28/18  Maida Encinas PA-C   lisinopril (PRINIVIL;ZESTRIL) 5 MG tablet Take 1 tablet by mouth daily 4/24/17   PABLO Trevino   metoprolol succinate (TOPROL XL) 50 MG extended release tablet Take 1 tablet by mouth daily 4/24/17   PABLO Trevino   VOLTAREN 1 % GEL APPLY 4 GRAMS TOPICALLY FOUR TIMES DAILY 4/12/17   F Ti Bautista MD   peppermint oil liquid Take 1 capsule by mouth as needed (FOR STOMACH UPSET)     Historical Provider, MD   albuterol sulfate  (90 BASE) MCG/ACT inhaler Inhale 2 puffs into the lungs every 6 hours as needed for Wheezing    Historical Provider, MD   levothyroxine (SYNTHROID) 75 MCG tablet Take 75 mcg by mouth Daily    Historical Provider, MD   Vitamin D (CHOLECALCIFEROL) 1000 UNITS CAPS capsule Take 1,000 Units by mouth daily    Historical Provider, MD   aspirin 81 MG tablet Take 81

## 2018-08-02 NOTE — ED PROVIDER NOTES
Emergency Physician Note    Chief Complaint  Speech Problem (change in speech since Mon, 7/30. No slurring or garbled speech noted in triage. pt answering questions appropriately but family states his [employer] is different than usual\".  ); Gait Problem (pt feels \"off balance\", reports he walks with a cane and finds himself swaying from side to side and he has to \"adjust\". Family states this has been happening over the past month but now getting worse. No hx of CVA); and Insomnia (family reports pt is sleep walking for the past few months. pt unaware.  family states pt is \"sun-downing\", no hx of dementia. )       History of Present Illness  Paul Steele is a [de-identified] y.o. male who presents to the ED for slurred speech and ataxia. Family reports that the patient has had slurred speech and ataxia off and on for the last few days or a week. Patient states he feels off balance from time to time and will sway either side both the right and the left. He denies any trouble with his vision or any numbness or weakness of the extremities. The family reports they believe he has \"sundowning\". He's never been diagnosed with dementia. He will occasionally turn on the lights at night and get himself dressed as if he is preparing to go to work and will say he is going to work despite being retired for 15 years. Patient does not recall these episodes. Currently the patient complains only of feeling generally weak and having some neck pain which is chronic for him. He denies any chest pain or shortness of breath or any fevers, chills or sweats. 10 systems reviewed, pertinent positives per HPI otherwise noted to be negative    I have reviewed the following from the nursing documentation:      Prior to Admission medications    Medication Sig Start Date End Date Taking?  Authorizing Provider   diclofenac sodium 1 % GEL APPLY 4 G TOPICALLY 4 TIMES DAILY 7/2/18 8/1/18  Maida Thorpe PA-C   pregabalin (LYRICA) 100 MG Allergies as of 08/02/2018 - Review Complete 08/02/2018   Allergen Reaction Noted    Erythromycin Hives 09/13/2016    Penicillins Hives 01/26/2011    Sulfa antibiotics Hives 10/27/2010    Tetanus toxoids Hives 01/26/2011    Tetracyclines & related  01/26/2011       Past Medical History:   Diagnosis Date    Arthritis     Asthma     BPH     CAD (coronary artery disease)     Chest pain     Hearing decreased     HEARING AIDS    Hyperlipidemia     Hypertension     Need for post exposure prophylaxis for rabies 1980    Thyroid disease     hypothyroidism    Wears glasses         Surgical History:   Past Surgical History:   Procedure Laterality Date    BACK SURGERY      DISCECTOMY    BACK SURGERY  11/2017    lamenectomy, facetotomy, decompression l2-3, l3-4, l4-5    CATARACT REMOVAL Left 6/17/16    CATARACT REMOVAL WITH IMPLANT Right     COLONOSCOPY  2011    COLONOSCOPY  6/28/11    polyps    COLONOSCOPY  8/14    polyps    CORONARY ANGIOPLASTY WITH STENT PLACEMENT  0718/2011    CYST REMOVAL  1/3/2014    central back    FINGER SURGERY      GROWTH ON FIRST FINGER REMOVED    FRACTURE SURGERY  age 14    nose    HIP SURGERY Left 04/19/2017    LEFT LATERAL TOTAL HIP REPLACEMENT WITH CELLSAVER AND    JOINT REPLACEMENT Right 2006    TKR    JOINT REPLACEMENT Left 11/2013    TKR    JOINT REPLACEMENT Right 2008    THR    LUMBAR LAMINECTOMY N/A 01/06/2017    micro lumbar laminectomy L2-L3        Family History:    Family History   Problem Relation Age of Onset    Cancer Father         bladder       Social History     Social History    Marital status:      Spouse name: N/A    Number of children: N/A    Years of education: N/A     Occupational History    Not on file.      Social History Main Topics    Smoking status: Former Smoker     Packs/day: 1.00     Years: 1.00     Types: Cigarettes     Quit date: 1/28/1960    Smokeless tobacco: Never Used    Alcohol use Yes      Comment: nothing to drink x2 weeks, but previously drank 2-3 liquor daily    Drug use: No    Sexual activity: Not on file     Other Topics Concern    Not on file     Social History Narrative    No narrative on file       Nursing notes reviewed. ED Triage Vitals [08/02/18 1018]   Enc Vitals Group      /69      Pulse 50      Resp 18      Temp 97.4 °F (36.3 °C)      Temp Source Oral      SpO2 94 %      Weight 250 lb (113.4 kg)      Height 5' 7\" (1.702 m)      Head Circumference       Peak Flow       Pain Score       Pain Loc       Pain Edu? Excl. in [address]? GENERAL:  Awake, alert. Well developed, well nourished with no apparent distress. HENT:  Normocephalic, Atraumatic, moist mucous membranes. EYES:  Pupils equal round and reactive to light, Conjunctiva normal, extraocular movements normal.  NECK:  No meningeal signs, Supple. CHEST:  Regular rate and rhythm, chest wall non-tender. LUNGS:  Clear to auscultation bilaterally, no respiratory distress. ABDOMEN:  Soft, non-tender, no rebound, rigidity or guarding, non-distended, normal bowel sounds. No costovertebral angle tenderness to palpation. EXTREMITIES:  Normal range of motion, no edema, no tenderness, no deformity, distal pulses present. BACK:  No tenderness. SKIN: Warm, dry and intact. NEUROLOGIC: Awake, alert and oriented to person, place and time. Strength 5/5 in bilateral upper and lower extremities. Sensation is intact to light touch in the upper and lower extremities. Cranial Nerves 2-12 are intact. Patellar DTRs intact. Finger-to-nose intact. Heel-to-shin intact. LABS and DIAGNOSTIC RESULTS  EKG  The Ekg interpreted by me shows  normal sinus rhythm with a rate of 79  Axis is   Normal  QTc is  normal  Intervals and Durations are unremarkable. ST Segments: normal  Delta waves, Brugada Syndrome, and Short CT are not present.   No significant change from prior EKG dated 4/20/17    RADIOLOGY  X-RAYS:  I have reviewed radiologic plain 0-2 0 - 5 /HPF    RBC, UA 20-50 (A) 0 - 2 /HPF    Epi Cells 0-2 /HPF    Amorphous, UA 1+ (A) /HPF   POCT Glucose   Result Value Ref Range    Glucose 104 mg/dL    QC OK? Yes    POCT Glucose   Result Value Ref Range    POC Glucose 104 (H) 70 - 99 mg/dl    Performed on ACCU-CHEK    EKG 12 Lead   Result Value Ref Range    Ventricular Rate 79 BPM    Atrial Rate 79 BPM    P-R Interval 198 ms    QRS Duration 74 ms    Q-T Interval 372 ms    QTc Calculation (Bazett) 426 ms    P Axis 32 degrees    R Axis -6 degrees    T Axis 17 degrees    Diagnosis       Normal sinus rhythmNormal ECGWhen compared with ECG of 20-APR-2017 15:48,No significant change was found       I spoke with Dr. Arden Aschoff. We thoroughly discussed the history, physical exam, laboratory and imaging studies, as well as, emergency department course. Based upon that discussion, we've decided to admit Milagros Melton for further observation and evaluation of Le January Mayi's mental status change. As I have deemed necessary from their history, physical and studies, I have considered and evaluated Milagros Melton for the following diagnoses:  DIABETES, INTRACRANIAL HEMORRHAGE, MENINGITIS, SEPSIS SUBARACHNOID HEMORRHAGE, SUBDURAL HEMATOMA, & STROKE. FINAL IMPRESSION  1. ESRA (acute kidney injury) ([facility] [address]    2. Ataxia        Vitals:  Blood pressure 131/86, pulse 63, temperature 99.1 °F (37.3 °C), temperature source Oral, resp. rate 16, height 5' 7\" (1.702 m), weight 250 lb (113.4 kg), SpO2 98 %. Patient was given scripts for the following medications. I counseled patient how to take these medications. Current Discharge Medication List          Disposition  Pt is in stable condition upon Admit to med/surg floor. This chart was generated using the [address] [place] [address] dictation system. I created this record but it may contain dictation errors.           Silva Fernández MD  08/02/18 1888

## 2018-08-03 VITALS
SYSTOLIC BLOOD PRESSURE: 117 MMHG | RESPIRATION RATE: 17 BRPM | HEIGHT: 67 IN | OXYGEN SATURATION: 96 % | DIASTOLIC BLOOD PRESSURE: 71 MMHG | BODY MASS INDEX: 38.3 KG/M2 | HEART RATE: 90 BPM | TEMPERATURE: 97.9 F | WEIGHT: 244 LBS

## 2018-08-03 LAB
A/G RATIO: 1.2 (ref 1.1–2.2)
ALBUMIN SERPL-MCNC: 3.1 G/DL (ref 3.4–5)
ALP BLD-CCNC: 55 U/L (ref 40–129)
ALT SERPL-CCNC: 12 U/L (ref 10–40)
ANION GAP SERPL CALCULATED.3IONS-SCNC: 10 MMOL/L (ref 3–16)
AST SERPL-CCNC: 12 U/L (ref 15–37)
BASOPHILS ABSOLUTE: 0 K/UL (ref 0–0.2)
BASOPHILS RELATIVE PERCENT: 0.4 %
BILIRUB SERPL-MCNC: 1 MG/DL (ref 0–1)
BUN BLDV-MCNC: 21 MG/DL (ref 7–20)
CALCIUM SERPL-MCNC: 8.5 MG/DL (ref 8.3–10.6)
CHLORIDE BLD-SCNC: 106 MMOL/L (ref 99–110)
CO2: 26 MMOL/L (ref 21–32)
CREAT SERPL-MCNC: 0.9 MG/DL (ref 0.8–1.3)
EOSINOPHILS ABSOLUTE: 0.1 K/UL (ref 0–0.6)
EOSINOPHILS RELATIVE PERCENT: 1.9 %
GFR AFRICAN AMERICAN: >60
GFR NON-AFRICAN AMERICAN: >60
GLOBULIN: 2.6 G/DL
GLUCOSE BLD-MCNC: 99 MG/DL (ref 70–99)
HCT VFR BLD CALC: 37.6 % (ref 40.5–52.5)
HEMOGLOBIN: 12.8 G/DL (ref 13.5–17.5)
LYMPHOCYTES ABSOLUTE: 1.2 K/UL (ref 1–5.1)
LYMPHOCYTES RELATIVE PERCENT: 17.8 %
MCH RBC QN AUTO: 31.3 PG (ref 26–34)
MCHC RBC AUTO-ENTMCNC: 34 G/DL (ref 31–36)
MCV RBC AUTO: 92.1 FL (ref 80–100)
MONOCYTES ABSOLUTE: 1 K/UL (ref 0–1.3)
MONOCYTES RELATIVE PERCENT: 13.8 %
NEUTROPHILS ABSOLUTE: 4.6 K/UL (ref 1.7–7.7)
NEUTROPHILS RELATIVE PERCENT: 66.1 %
PDW BLD-RTO: 14.4 % (ref 12.4–15.4)
PLATELET # BLD: 274 K/UL (ref 135–450)
PMV BLD AUTO: 8.5 FL (ref 5–10.5)
POTASSIUM REFLEX MAGNESIUM: 3.9 MMOL/L (ref 3.5–5.1)
RBC # BLD: 4.08 M/UL (ref 4.2–5.9)
SODIUM BLD-SCNC: 142 MMOL/L (ref 136–145)
TOTAL PROTEIN: 5.7 G/DL (ref 6.4–8.2)
WBC # BLD: 7 K/UL (ref 4–11)

## 2018-08-03 PROCEDURE — G8987 SELF CARE CURRENT STATUS: HCPCS

## 2018-08-03 PROCEDURE — 97530 THERAPEUTIC ACTIVITIES: CPT

## 2018-08-03 PROCEDURE — G8989 SELF CARE D/C STATUS: HCPCS

## 2018-08-03 PROCEDURE — G8979 MOBILITY GOAL STATUS: HCPCS

## 2018-08-03 PROCEDURE — 97162 PT EVAL MOD COMPLEX 30 MIN: CPT

## 2018-08-03 PROCEDURE — 97165 OT EVAL LOW COMPLEX 30 MIN: CPT

## 2018-08-03 PROCEDURE — 6360000002 HC RX W HCPCS: Performed by: FAMILY MEDICINE

## 2018-08-03 PROCEDURE — 97535 SELF CARE MNGMENT TRAINING: CPT

## 2018-08-03 PROCEDURE — 6370000000 HC RX 637 (ALT 250 FOR IP): Performed by: FAMILY MEDICINE

## 2018-08-03 PROCEDURE — G8980 MOBILITY D/C STATUS: HCPCS

## 2018-08-03 PROCEDURE — 80053 COMPREHEN METABOLIC PANEL: CPT

## 2018-08-03 PROCEDURE — 2580000003 HC RX 258: Performed by: FAMILY MEDICINE

## 2018-08-03 PROCEDURE — 85025 COMPLETE CBC W/AUTO DIFF WBC: CPT

## 2018-08-03 PROCEDURE — G8978 MOBILITY CURRENT STATUS: HCPCS

## 2018-08-03 PROCEDURE — 97116 GAIT TRAINING THERAPY: CPT

## 2018-08-03 PROCEDURE — G8988 SELF CARE GOAL STATUS: HCPCS

## 2018-08-03 PROCEDURE — 36415 COLL VENOUS BLD VENIPUNCTURE: CPT

## 2018-08-03 PROCEDURE — 99223 1ST HOSP IP/OBS HIGH 75: CPT | Performed by: PSYCHIATRY & NEUROLOGY

## 2018-08-03 RX ORDER — PREGABALIN 75 MG/1
75 CAPSULE ORAL 2 TIMES DAILY
Qty: 60 CAPSULE | Refills: 0 | Status: ON HOLD | OUTPATIENT
Start: 2018-08-03 | End: 2020-10-06 | Stop reason: HOSPADM

## 2018-08-03 RX ADMIN — LEVOTHYROXINE SODIUM 75 MCG: 50 TABLET ORAL at 06:27

## 2018-08-03 RX ADMIN — ASPIRIN 81 MG: 81 TABLET, COATED ORAL at 09:37

## 2018-08-03 RX ADMIN — ENOXAPARIN SODIUM 30 MG: 30 INJECTION SUBCUTANEOUS at 09:37

## 2018-08-03 RX ADMIN — SODIUM CHLORIDE, PRESERVATIVE FREE 10 ML: 5 INJECTION INTRAVENOUS at 09:38

## 2018-08-03 RX ADMIN — TAMSULOSIN HYDROCHLORIDE 0.4 MG: 0.4 CAPSULE ORAL at 09:37

## 2018-08-03 RX ADMIN — LISINOPRIL 10 MG: 10 TABLET ORAL at 09:37

## 2018-08-03 RX ADMIN — VITAMIN D, TAB 1000IU (100/BT) 1000 UNITS: 25 TAB at 09:36

## 2018-08-03 RX ADMIN — FINASTERIDE 5 MG: 5 TABLET, FILM COATED ORAL at 09:37

## 2018-08-03 RX ADMIN — PREGABALIN 75 MG: 75 CAPSULE ORAL at 09:37

## 2018-08-03 RX ADMIN — METOPROLOL SUCCINATE 50 MG: 50 TABLET, EXTENDED RELEASE ORAL at 09:37

## 2018-08-03 ASSESSMENT — PAIN DESCRIPTION - PAIN TYPE: TYPE: ACUTE PAIN

## 2018-08-03 ASSESSMENT — PAIN DESCRIPTION - LOCATION: LOCATION: HAND

## 2018-08-03 ASSESSMENT — PAIN SCALES - GENERAL: PAINLEVEL_OUTOF10: 6

## 2018-08-03 NOTE — PROGRESS NOTES
Physical Therapy    Facility/Department: [facility] A2 CARD TELEMETRY  Initial Assessment, treatment, DC summary    NAME: Kanika Atkinson  : 1935  MRN: [id number]    Date of Service: 8/3/2018    Discharge Recommendations:  Outpatient PT, Home with assist PRN        Patient Diagnosis(es): The primary encounter diagnosis was SERA (acute kidney injury) ([facility] [address] A diagnosis of Ataxia was also pertinent to this visit. has a past medical history of Arthritis; Asthma; BPH; CAD (coronary artery disease); Chest pain; Hearing decreased; Hyperlipidemia; Hypertension; Need for post exposure prophylaxis for rabies; Thyroid disease; and Wears glasses. has a past surgical history that includes Coronary angioplasty with stent (); fracture surgery (age 14); joint replacement (Right, ); joint replacement (Left, 2013); joint replacement (Right, ); cyst removal (1/3/2014); Colonoscopy (); Colonoscopy (11); Colonoscopy (); Finger surgery; Cataract removal with implant (Right); Cataract removal (Left, 16); lumbar laminectomy (N/A, 2017); hip surgery (Left, 2017); back surgery; and back surgery (2017).     Restrictions  Restrictions/Precautions  Restrictions/Precautions: General Precautions, Fall Risk  Position Activity Restriction  Other position/activity restrictions: up with assist   Vision/Hearing  Vision: Impaired  Vision Exceptions: Wears glasses at all times  Hearing: Exceptions to [facility]  Hearing Exceptions: Hard of hearing/hearing concerns;Bilateral hearing aid (at home)     Subjective  General  Chart Reviewed: Yes  Patient assessed for rehabilitation services?: Yes  Response To Previous Treatment: Not applicable  Family / Caregiver Present: Yes (daughter)  Referring Practitioner: John Nelson  Referral Date : 18  Diagnosis: ataxia  Follows Commands: Within Functional Limits  General Comment  Comments: Pt sitting EOB upon entry, RN cleared pt for therapy  Subjective  Subjective: Pt agreeable to PT  Pain Screening  Patient Currently in Pain: Yes  Pain Assessment  Pain Assessment: 0-10  Pain Level: 6  Pain Type: Acute pain  Pain Location: Hand (right thumb)  Pain Intervention(s): Ambulation/Increased activity;Repositioned;RN notified; Therapeutic presence  Vital Signs  Patient Currently in Pain: Yes  Pre Treatment Pain Screening  Intervention List: Patient able to continue with treatment    Orientation       Social/Functional History  Social/Functional History  Lives With: Spouse  Type of Home: House  Home Layout: Two level, Able to Live on Main level with bedroom/bathroom  Home Access: Stairs to enter with rails  Entrance Stairs - Number of Steps: 2  Entrance Stairs - Rails: Right  Bathroom Shower/Tub: Walk-in shower, Shower chair with back (also walk in tub)  Bathroom Toilet: Standard  Bathroom Equipment: Grab bars in shower, Shower chair, 3-in-1 commode  Home Equipment: Cane, Rolling walker, [address], Reacher, Grab bars, Sock aid  ADL Assistance: Independent  Homemaking Responsibilities: No  Ambulation Assistance: Independent (with cane )  Transfer Assistance: Independent  Active : Yes  Occupation: Retired  Type of occupation: retired ,   Objective          AROM RLE (degrees)  RLE AROM: WFL  AROM LLE (degrees)  LLE AROM : WFL  Strength RLE  Strength RLE: WFL  Comment: grossly 4+ to 5/5 throughout  Strength LLE  Strength LLE: WFL  Comment: grossly 4+ to 5/5 throughout        Bed mobility  Supine to Sit:  (HOB slightly elevated)  Sit to Supine: Modified independent  Scooting: Modified independent (to EOB and to scoot forward and back in chair)  Transfers  Sit to Stand: Modified independent (with SPC)  Stand to sit: Modified independent  Bed to Chair: Modified independent  Ambulation  Ambulation?: Yes  Ambulation 1  Surface: level tile  Device: Single point cane  Assistance: Modified Independent (with SPC)  Quality of Gait:

## 2018-08-03 NOTE — DISCHARGE SUMMARY
Hospital Medicine Discharge Summary    Patient ID: Christiano Briseno      Patient's PCP: Rangel Juan MD    Admit Date: 8/2/2018     Discharge Date: 8/3/2018      Admitting Physician: Raza Montes MD     Discharge Physician: Luis Alberto Lora MD     Discharge Diagnoses: Active Hospital Problems    Diagnosis Date Noted    Essential hypertension [I10] 07/15/2011     Priority: Medium    Acute encephalopathy [G93.40]     SERA (acute kidney injury) ([address] [N17.9]     Acquired hypothyroidism [E03.9]     DARON (obstructive sleep apnea) [G47.33]     Ataxia [R27.0] 08/02/2018    Thyroid disease [E07.9]        The patient was seen and examined on day of discharge and this discharge summary is in conjunction with any daily progress note from day of discharge. Hospital Course:       Ataxia, confusion, slurred speech -  now resolved.  Difficult to determine if this is polypharmacy, progressive neuropathy (longstanding history of neuropathy and follows with PM&R), cerebral event, undiagnosed sleep apnea, or dementia or a combination of those. No evidence of infection. MRI w/out acute findings. Ammonia, TSH WNL w/ Folate, B12 pending. Holding home Ambien and decreased Lyrica in setting of SERA. Neurology consulted and appreciated - w/ plans to f/u outpt for recommended outpt sleep study.      HypoThyroid - clinically euthyroid on oral replacement therapy - TSH this admission WNL. Continue, w/ outpt monitoring as previously arranged.      HTN - w/out known CAD and no evidence of active signs/sxs of ischemia/failure. Currently controlled on home meds w/ vitals reviewed and documented as above.     ARF - w/ elevated BUN/Cr ratio c/w pre-renal azotemia. Given gentle IVF hydration and followed serial labs - resolved.       BPH - controlled on home Proscar and Flomax w/out obstructive sxs - continued. .     Suspected DARON - overnight O2 monitoring. Recommend outpt sleep study.  Do not recommend he take

## 2018-08-03 NOTE — PROGRESS NOTES
Location: Hand (right thumb)  Pain Intervention(s): Ambulation/Increased activity, Repositioned, RN notified, Therapeutic presence     Social/Functional History  Social/Functional History  Lives With: Spouse  Type of Home: House  Home Layout: Two level, Able to Live on Main level with bedroom/bathroom  Home Access: Stairs to enter with rails  Entrance Stairs - Number of Steps: 2  Entrance Stairs - Rails: Right  Bathroom Shower/Tub: Walk-in shower, Shower chair with back (also walk in tub)  Bathroom Toilet: Standard  Bathroom Equipment: Grab bars in shower, Shower chair, 3-in-1 commode  Home Equipment: Cane, Rolling walker, [employer], Reacher, Grab bars, Cuedd aid  ADL Assistance: Independent  Homemaking Responsibilities: No  Ambulation Assistance: Independent (with cane )  Transfer Assistance: Independent  Active : Yes  Occupation: Retired  Type of occupation: retired ,      Objective        Orientation  Overall Orientation Status: Within Functional Limits     Balance  Sitting Balance: Modified independent  (Posterior lean with dynamic tasks)  Standing Balance: Modified independent   Standing Balance  Time: >5 minutes, 2-3 minutes  Activity: mobility, standing ADL   Sit to stand: Modified independent  Stand to sit: Modified independent  Functional Mobility  Functional - Mobility Device: Cane  Activity: To/from bathroom; Other  Assist Level: Modified independent   Toilet Transfers  Toilet - Technique: Ambulating (cane)  Equipment Used: Standard toilet (use of grab bars)  Toilet Transfer: Modified independent  ADL  Grooming: Modified independent  (in stance at sink)  LE Dressing: Modified independent  (to doff/don socks seated EOB)  Toileting: Modified independent   Tone RUE  RUE Tone: Normotonic  Tone LUE  LUE Tone: Normotonic     Bed mobility  Supine to Sit: Modified independent (HOB slightly elevated)  Sit to Supine: Unable to assess (pt left up in chair)  Scooting: Modified independent (to EOB)  Transfers  Sit to stand: Modified independent  Stand to sit: Modified independent     Cognition  Overall Cognitive Status: [facility]  Cognition Comment: Some repetition required d/t [place]       LUE AROM (degrees)  LUE AROM : WFL  Left Hand AROM (degrees)  Left Hand AROM: WFL  RUE AROM (degrees)  RUE AROM : WFL  Right Hand AROM (degrees)  Right Hand AROM: WFL  LUE Strength  Gross LUE Strength: WFL  RUE Strength  Gross RUE Strength: WFL    Assessment   Performance deficits / Impairments: Decreased endurance;Decreased high-level IADLs  Assessment: [place] [facility] [place] functional mobility and transfers, and ADL at time of eval. Believe pt will be safe to return home with supervision/assist available from his wife. Pt and daughter report no OT concerns for home, with no further OT services indicated at this time. Prognosis: Good  Decision Making: Low Complexity  Patient Education: Role of OT, safety   REQUIRES OT FOLLOW UP: Yes  Activity Tolerance  Activity Tolerance: Patient Tolerated treatment well  Activity Tolerance: Pt with signs of SOB with mobility, Spo2 96% on RA  Safety Devices  Safety Devices in place: Yes  Type of devices: Nurse notified; Chair alarm in place; Left in chair;Call light within reach;Gait belt            G-Code  OT G-codes  Functional Assessment Tool Used: -PAC   Score: 23  Functional Limitation: Self care  Self Care Current Status (): At least 1 percent but less than 20 percent impaired, limited or restricted  Self Care Goal Status (): At least 1 percent but less than 20 percent impaired, limited or restricted  Self Care Discharge Status ():  At least 1 percent but less than 20 percent impaired, limited or restricted    AM-PAC Score   AM-[facility] Inpatient Daily Activity Raw Score: 23  AM-PAC Inpatient ADL T-Scale Score : 51.12  ADL Inpatient CMS 0-100% Score: 15.86  ADL Inpatient CMS G-Code Modifier : CI    Goals  Short term goals  Time Frame for Short term goals: 1 time visit   Short term goal 1: Pt will complete functional transfers with Mod I - GOAL MET  Short term goal 2: Pt will demonstrate ability to reach to feet for LE dressing assessment - GOAL MET  Short term goal 3: Pt will perform 5 minutes dynamic standing activity with supervision or better - GOAL MET   Patient Goals   Patient goals : \"to go home today\"       Therapy Time   Individual Concurrent Group Co-treatment   Time In 0838 (10 minutes for eval)         Time Out 0911         Minutes 33         Timed Code Treatment Minutes: 23 Minutes     This note to serve as d/c summary should pt d/c prior to next session.     Vashti Alvarez, OTR/L

## 2018-08-03 NOTE — PROGRESS NOTES
Perfect serve page sent to cross cover: Pt's left thumb is swollen and pt is complaining of pain in his thumb and his hand at a 5/10. Pt states he has arthritis but it is not usually this bad. Can an anti-inflammatory be ordered? Pt has no PRNs on board for pain. Thank you!

## 2018-08-03 NOTE — CONSULTS
Smoking status: Former Smoker     Packs/day: 1.00     Years: 1.00     Types: Cigarettes     Quit date: 1/28/1960    Smokeless tobacco: Never Used    Alcohol use Yes      Comment: nothing to drink x2 weeks, but previously drank 2-3 liquor daily     Allergies   Allergen Reactions    Erythromycin Hives    Penicillins Hives    Sulfa Antibiotics Hives    Tetanus Toxoids Hives    Tetracyclines & Related      Current Facility-Administered Medications   Medication Dose Route Frequency Provider Last Rate Last Dose    albuterol sulfate  (90 Base) MCG/ACT inhaler 2 puff  2 puff Inhalation Q6H PRN Ben Schmitt MD   2 puff at 08/02/18 2025    aspirin EC tablet 81 mg  81 mg Oral Daily Ben Schmitt MD   81 mg at 08/03/18 4795    atorvastatin (LIPITOR) tablet 20 mg  20 mg Oral Nightly Ben Schmitt MD   20 mg at 08/02/18 2039    finasteride (PROSCAR) tablet 5 mg  5 mg Oral Daily Ben Schmitt MD   5 mg at 08/03/18 8087    levothyroxine (SYNTHROID) tablet 75 mcg  75 mcg Oral Daily Ben Schmitt MD   75 mcg at 08/03/18 2623    metoprolol succinate (TOPROL XL) extended release tablet 50 mg  50 mg Oral Daily Ben Schmitt MD   50 mg at 08/03/18 9098    pregabalin (LYRICA) capsule 75 mg  75 mg Oral BID Ben Schmitt MD   75 mg at 08/03/18 0937    tamsulosin (FLOMAX) capsule 0.4 mg  0.4 mg Oral BID Ben Schmitt MD   0.4 mg at 08/03/18 4462    vitamin D (CHOLECALCIFEROL) tablet 1,000 Units  1,000 Units Oral Daily Ben Schmitt MD   1,000 Units at 08/03/18 0936    sodium chloride flush 0.9 % injection 10 mL  10 mL Intravenous 2 times per day Ben Schmitt MD   10 mL at 08/03/18 6926    sodium chloride flush 0.9 % injection 10 mL  10 mL Intravenous PRN Ben Schmitt MD        magnesium hydroxide (MILK OF MAGNESIA) 400 MG/5ML suspension 30 mL  30 mL Oral Daily PRN Ben Schmitt MD        ondansetron (ZOFRAN) injection 4 mg  4 mg Intravenous Q6H PRN Tamara CANNON no pronator drift, no dysmetria. Finger nose finger testing within normal limits. Sensation: normal to all modalities in UE and decreased in LL. Gait/Posture: steady with assistance    Data:  LABS:   Lab Results   Component Value Date     08/03/2018    K 3.9 08/03/2018     08/03/2018    CO2 26 08/03/2018    BUN 21 08/03/2018    CREATININE 0.9 08/03/2018    GFRAA >60 08/03/2018    GFRAA >60 07/17/2011    LABGLOM >60 08/03/2018    GLUCOSE 99 08/03/2018    CALCIUM 8.5 08/03/2018     Lab Results   Component Value Date    WBC 7.0 08/03/2018    RBC 4.08 08/03/2018    HGB 12.8 08/03/2018    HCT 37.6 08/03/2018    MCV 92.1 08/03/2018    RDW 14.4 08/03/2018     08/03/2018     Lab Results   Component Value Date    INR 1.10 03/21/2017    PROTIME 12.4 03/21/2017       Neuroimaging and/or  labs reviewed by me and discussed results with the patient/and family. Impression:  Acute encephalopathy, ataxia and gait disturbance seems to be improving. Possible etiology could include toxic or metabolic encephalopathy, dehydration, acute kidney injury, polypharmacy and less likely new ischemic stroke. Acute kidney injury, improving  Hypertension  Hypothyroid  Chronic cognitive impairment and possible underlying dementia. Will need further workup outpatient  Chronic insomnia  Parasomnia  Possible sleep apnea. Recommendation:  Advised the patient to discontinue Ambien at home  Continue home blood pressure medications  Monitor blood pressure  Aspirin for stroke prevention  PT and OT  Telemetry  Check orthostatics  Decreased Lyrica to 100 mg twice daily  Improving sleep hygiene  Falling precautions  Driving restrictions  Continue Synthroid and follow TFT  Can be DC from neurology when medically stable and follow up with me in 2-3 weeks for sleep evaluation      Thank you for referring such patient. If you have any questions regarding my consult note, please don't hesitate to call me.      Kimberly Vázquez, MD  [phone]    This dictation was generated by voice recognition computer software.  Although all attempts are made to edit the dictation for accuracy, there may be errors in the  transcription that are not intended

## 2018-08-03 NOTE — PROGRESS NOTES
08/02/18 2030   Oxygen Therapy/Pulse Ox   O2 Device None (Room air)   SpO2 98 %   Treatment   Treatment Type MDI;IS   Medications Albuterol   Duration 10   Pre-Tx Pulse 86   Pre-Tx Resps 16   Breath Sounds Pre-Tx Left Clear   Breath Sounds Pre-Tx Right Diminished   Breath Sounds Post-Tx Left Clear   Breath Sounds Post-Tx Right Diminished   Post-Tx Pulse 86   Post-Tx Resps 16   Delivery Source Other (Comment)   Position Semi-Faria's   Tx Tolerance Well   Incentive Spirometry Tx   Treatment Tolerance Well   Incentive Spirometry Goal (mL) 991 mL   Incentive Spirometry Achieved (mL) 1600 mL   Cough/Sputum   Sputum How Obtained Spontaneous cough   Cough Non-productive

## 2018-08-03 NOTE — PROGRESS NOTES
Hospitalist Progress Note      PCP: Charlotte Kohli MD    Date of Admission: 8/2/2018    Chief Complaint: unsteadiness    Subjective: no new c/o. Medications:  Reviewed    Infusion Medications   Scheduled Medications    aspirin  81 mg Oral Daily    atorvastatin  20 mg Oral Nightly    finasteride  5 mg Oral Daily    levothyroxine  75 mcg Oral Daily    metoprolol succinate  50 mg Oral Daily    pregabalin  75 mg Oral BID    tamsulosin  0.4 mg Oral BID    vitamin D  1,000 Units Oral Daily    sodium chloride flush  10 mL Intravenous 2 times per day    enoxaparin  30 mg Subcutaneous Daily    lisinopril  10 mg Oral Daily     PRN Meds: albuterol sulfate HFA, sodium chloride flush, magnesium hydroxide, ondansetron, methyl salicylate-menthol      Intake/Output Summary (Last 24 hours) at 08/03/18 1119  Last data filed at 08/03/18 1022   Gross per 24 hour   Intake                0 ml   Output                0 ml   Net                0 ml       Physical Exam Performed:    /71   Pulse 90   Temp 97.9 °F (36.6 °C) (Oral)   Resp 17   Ht 5' 7\" (1.702 m)   Wt 244 lb (110.7 kg)   SpO2 96%   BMI 38.22 kg/m²     General appearance: No apparent distress, appears stated age and cooperative. HEENT: Pupils equal, round, and reactive to light. Conjunctivae/corneas clear. Neck: Supple, with full range of motion. No jugular venous distention. Trachea midline. Respiratory:  Normal respiratory effort. Clear to auscultation, bilaterally without Rales/Wheezes/Rhonchi. Cardiovascular: Regular rate and rhythm with normal S1/S2 without murmurs, rubs or gallops. Abdomen: Soft, non-tender, non-distended with normal bowel sounds. Musculoskeletal: No clubbing, cyanosis or edema bilaterally. Full range of motion without deformity. Skin: Skin color, texture, turgor normal.  No rashes or lesions. Neurologic:  Neurovascularly intact without any focal sensory/motor deficits.  Cranial nerves: II-XII intact, grossly non-focal.  Psychiatric: Alert and oriented, thought content appropriate, normal insight  Capillary Refill: Brisk,< 3 seconds   Peripheral Pulses: +2 palpable, equal bilaterally       Labs:   Recent Labs      08/02/18   1027  08/03/18   0706   WBC  6.9  7.0   HGB  12.9*  12.8*   HCT  38.5*  37.6*   PLT  302  274     Recent Labs      08/02/18   1027  08/03/18   0706   NA  142  142   K  4.1  3.9   CL  104  106   CO2  27  26   BUN  32*  21*   CREATININE  1.6*  0.9   CALCIUM  8.0*  8.5     Recent Labs      08/02/18   1027  08/03/18   0706   AST  13*  12*   ALT  11  12   BILITOT  0.9  1.0   ALKPHOS  54  55     No results for input(s): INR in the last 72 hours. No results for input(s): Herlene Ladarius in the last 72 hours. Urinalysis:    Lab Results   Component Value Date    NITRU Negative 08/02/2018    WBCUA 0-2 08/02/2018    BACTERIA 1+ 04/22/2017    RBCUA 20-50 08/02/2018    BLOODU SMALL 08/02/2018    SPECGRAV 1.020 08/02/2018    GLUCOSEU Negative 08/02/2018       Radiology:  MRI BRAIN WO CONTRAST   Final Result   No acute intracranial abnormality identified. CT HEAD WO CONTRAST   Final Result   No acute intracranial abnormality. Assessment/Plan:    Active Hospital Problems    Diagnosis Date Noted    Essential hypertension [I10] 07/15/2011     Priority: Medium    Ataxia [R27.0] 08/02/2018    Thyroid disease [E07.9]        Ataxia, confusion, slurred speech -  now resolved. Difficult to determine if this is polypharmacy, progressive neuropathy (longstanding history of neuropathy and follows with PM&R), cerebral event, undiagnosed sleep apnea, or dementia or a combination of those. No evidence of infection. MRI w/out acute findings. Ammonia, TSH WNL w/ Folate, B12 pending. Holding home Ambien and decreased Lyrica in setting of SERA. Neurology consulted and appreciated in advance. Recommend outpt sleep study and follow up with PM&R.  Also recommend following mental status exams with PCP.      HypoThyroid - clinically euthyroid on oral replacement therapy - TSH this admission WNL. Continue, w/ outpt monitoring as previously arranged.      HTN - controlled on home lisinopril and toprol XL (takes 100mg at home but getting 50mg here - will increase if needed).     ARF - w/ elevated BUN/Cr ratio c/w pre-renal azotemia. Given gentle IVF hydration and followed serial labs - resolved. Reviewed and documented as above. He is on Torsemide which is being held. Also uses NSAIDs and Dicolfenac gel - held.      BPH - controlled on home Proscar and Flomax w/out obstructive sxs - continued. .     Suspected DARON - overnight O2 monitoring. Recommend outpt sleep study. Do not recommend he take Ambien.       DVT Prophylaxis: LMWH  Diet: DIET GENERAL;  Code Status: Full Code      PT/OT Eval Status: ordered and pending, likely home w/ assist.      Abran Gipson - Possibly Friday/Sat 3/4 August pending clinical course and neurology recs.      Marylen Meissner, MD

## 2018-08-03 NOTE — PROGRESS NOTES
RESPIRATORY THERAPY ASSESSMENT    Name:  Danyell Deshpande  Record Number:  [id number]  Age: [de-identified] y.o. Gender: male  : 1935  Today's Date:  2018  Room:  [address] [place] [address]-    Assessment     Is the patient being admitted for a COPD or Asthma exacerbation? No   (If yes the patient will be seen every 4 hours for the first 24 hours and then reassessed)    Patient Admission Diagnosis      Allergies  Allergies   Allergen Reactions    Erythromycin Hives    Penicillins Hives    Sulfa Antibiotics Hives    Tetanus Toxoids Hives    Tetracyclines & Related        Minimum Predicted Vital Capacity:     991          Actual Vital Capacity:      1600          Pulmonary History:Asthma  Home Oxygen Therapy:  room air  Home Respiratory Therapy:Albuterol   Current Respiratory Therapy:  Albuterol mdi prn  Treatment Type: MDI, IS  Medications: Albuterol    Respiratory Severity Index(RSI)   Patients with orders for inhalation medications, oxygen, or any therapeutic treatment modality will be placed on Respiratory Protocol. They will be assessed with the first treatment and at least every 72 hours thereafter. The following severity scale will be used to determine frequency of treatment intervention.     Smoking History: Pulmonary Disease or Smoking History, Greater than 15 pack year = 2    Social History  Social History   Substance Use Topics    Smoking status: Former Smoker     Packs/day: 1.00     Years: 1.00     Types: Cigarettes     Quit date: 1960    Smokeless tobacco: Never Used    Alcohol use Yes      Comment: nothing to drink x2 weeks, but previously drank 2-3 liquor daily       Recent Surgical History: None = 0  Past Surgical History  Past Surgical History:   Procedure Laterality Date    BACK SURGERY      DISCECTOMY    BACK SURGERY  2017    lamenectomy, facetotomy, decompression l2-3, l3-4, l4-5    CATARACT REMOVAL Left 16    CATARACT REMOVAL WITH IMPLANT Right     COLONOSCOPY      COLONOSCOPY  6/28/11    polyps    COLONOSCOPY  8/14    polyps    CORONARY ANGIOPLASTY WITH STENT PLACEMENT  0718/2011    CYST REMOVAL  1/3/2014    central back    FINGER SURGERY      GROWTH ON FIRST FINGER REMOVED    FRACTURE SURGERY  age 14    nose    HIP SURGERY Left 04/19/2017    LEFT LATERAL TOTAL HIP REPLACEMENT WITH CELLSAVER AND    JOINT REPLACEMENT Right 2006    TKR    JOINT REPLACEMENT Left 11/2013    TKR    JOINT REPLACEMENT Right 2008    THR    LUMBAR LAMINECTOMY N/A 01/06/2017    micro lumbar laminectomy L2-L3       Level of Consciousness: Alert, Oriented, and Cooperative = 0    Level of Activity: Mostly sedentary, minimal walking = 2    Respiratory Pattern: Regular Pattern; RR 8-20 = 0    Breath Sounds: Clear = 0    Sputum   ,  , Sputum How Obtained: Spontaneous cough  Cough: Strong, spontaneous, non-productive = 0    Vital Signs   BP (!) 106/52   Pulse 77   Temp 98.1 °F (36.7 °C) (Oral)   Resp 17   Ht 5' 7\" (1.702 m)   Wt 244 lb (110.7 kg)   SpO2 98%   BMI 38.22 kg/m²   SPO2 (COPD values may differ): Greater than or equal to 92% on room air = 0    Peak Flow (asthma only): not applicable = 0    RSI: 0-4 = See once and convert to home regimen or discontinue        Plan       Goals: medication delivery and improve oxygenation    Patient/caregiver was educated on the proper method of use for Respiratory Care Devices:  Yes      Level of patient/caregiver understanding able to:   [] Verbalize understanding   [] Demonstrate understanding       [] Teach back        [] Needs reinforcement       []  No available caregiver               []  Other:     Response to education:  Good     Is patient being placed on Home Treatment Regimen? Yes     Does the patient have everything they need prior to discharge?   NA     Comments: chart reviewed and pt assessed    Plan of Care: home regimen    Electronically signed by Rani Faye RCP on 8/2/2018 at 8:33 PM    Respiratory Protocol Guidelines 1. Assessment and treatment by Respiratory Therapy will be initiated for medication and therapeutic interventions upon initiation of aerosolized medication. 2. Physician will be contacted for respiratory rate (RR) greater than 35 breaths per minute. Therapy will be held for heart rate (HR) greater than 140 beats per minute, pending direction from physician. 3. Bronchodilators will be administered via Metered Dose Inhaler (MDI) with spacer when the following criteria are met:  a. Alert and cooperative     b. HR < 140 bpm  c. RR < 30 bpm                d. Can demonstrate a 23 second inspiratory hold  4. Bronchodilators will be administered via Hand Held Nebulizer [facility] [place]) to patients when ANY of the following criteria are met  a. Incognizant or uncooperative          b. Patients treated with HHN at Home        c. Unable to demonstrate proper use of MDI with spacer     d. RR > 30 bpm   5. Bronchodilators will be delivered via Metered Dose Inhaler (MDI), HHN, Aerogen to intubated patients on mechanical ventilation. 6. Inhalation medication orders will be delivered and/or substituted as outlined below. Aerosolized Medications Ordering and Administration Guidelines:    1. All Medications will be ordered by a physician, and their frequency and/or modality will be adjusted as defined by the patients Respiratory Severity Index (RSI) score. 2. If the patient does not have documented COPD, consider discontinuing anticholinergics when RSI is less than 9.  3. If the bronchospasm worsens (increased RSI), then the bronchodilator frequency can be increased to a maximum of every 4 hours. If greater than every 4 hours is required, the physician will be contacted. 4. If the bronchospasm improves, the frequency of the bronchodilator can be decreased, based on the patient's RSI, but not less than home treatment regimen frequency.   5. Bronchodilator(s) will be discontinued if patient has a RSI less than 9 and has received no

## 2018-08-06 LAB
EKG ATRIAL RATE: 79 BPM
EKG DIAGNOSIS: NORMAL
EKG P AXIS: 32 DEGREES
EKG P-R INTERVAL: 198 MS
EKG Q-T INTERVAL: 372 MS
EKG QRS DURATION: 74 MS
EKG QTC CALCULATION (BAZETT): 426 MS
EKG R AXIS: -6 DEGREES
EKG T AXIS: 17 DEGREES
EKG VENTRICULAR RATE: 79 BPM

## 2018-08-07 ENCOUNTER — OFFICE VISIT (OUTPATIENT)
Dept: ORTHOPEDIC SURGERY | Age: 83
End: 2018-08-07

## 2018-08-07 VITALS
WEIGHT: 244.05 LBS | DIASTOLIC BLOOD PRESSURE: 72 MMHG | HEIGHT: 67 IN | SYSTOLIC BLOOD PRESSURE: 122 MMHG | BODY MASS INDEX: 38.3 KG/M2 | HEART RATE: 92 BPM

## 2018-08-07 DIAGNOSIS — M25.552 LEFT HIP PAIN: Primary | ICD-10-CM

## 2018-08-07 PROCEDURE — 4040F PNEUMOC VAC/ADMIN/RCVD: CPT | Performed by: PHYSICIAN ASSISTANT

## 2018-08-07 PROCEDURE — 1123F ACP DISCUSS/DSCN MKR DOCD: CPT | Performed by: PHYSICIAN ASSISTANT

## 2018-08-07 PROCEDURE — 99213 OFFICE O/P EST LOW 20 MIN: CPT | Performed by: PHYSICIAN ASSISTANT

## 2018-08-07 PROCEDURE — G8427 DOCREV CUR MEDS BY ELIG CLIN: HCPCS | Performed by: PHYSICIAN ASSISTANT

## 2018-08-07 PROCEDURE — 1111F DSCHRG MED/CURRENT MED MERGE: CPT | Performed by: PHYSICIAN ASSISTANT

## 2018-08-07 PROCEDURE — G8417 CALC BMI ABV UP PARAM F/U: HCPCS | Performed by: PHYSICIAN ASSISTANT

## 2018-08-07 PROCEDURE — 1101F PT FALLS ASSESS-DOCD LE1/YR: CPT | Performed by: PHYSICIAN ASSISTANT

## 2018-08-07 PROCEDURE — 1036F TOBACCO NON-USER: CPT | Performed by: PHYSICIAN ASSISTANT

## 2018-08-07 NOTE — PROGRESS NOTES
decompression l2-3, l3-4, l4-5    CATARACT REMOVAL Left 6/17/16    CATARACT REMOVAL WITH IMPLANT Right     COLONOSCOPY  2011    COLONOSCOPY  6/28/11    polyps    COLONOSCOPY  8/14    polyps    CORONARY ANGIOPLASTY WITH STENT PLACEMENT  0718/2011    CYST REMOVAL  1/3/2014    central back    FINGER SURGERY      GROWTH ON FIRST FINGER REMOVED    FRACTURE SURGERY  age 14    nose    HIP SURGERY Left 04/19/2017    LEFT LATERAL TOTAL HIP REPLACEMENT WITH CELLSAVER AND    JOINT REPLACEMENT Right 2006    TKR    JOINT REPLACEMENT Left 11/2013    TKR    JOINT REPLACEMENT Right 2008    THR    KNEE SURGERY      LUMBAR LAMINECTOMY N/A 01/06/2017    micro lumbar laminectomy L2-L3     Family History   Problem Relation Age of Onset    Cancer Father         bladder     Social History     Social History    Marital status:      Spouse name: N/A    Number of children: N/A    Years of education: N/A     Social History Main Topics    Smoking status: Former Smoker     Packs/day: 1.00     Years: 1.00     Types: Cigarettes     Quit date: 1/28/1960    Smokeless tobacco: Never Used    Alcohol use Yes      Comment: nothing to drink x2 weeks, but previously drank 2-3 liquor daily    Drug use: No    Sexual activity: Not Asked     Other Topics Concern    None     Social History Narrative    None     Current Outpatient Prescriptions   Medication Sig Dispense Refill    pregabalin (LYRICA) 75 MG capsule Take 1 capsule by mouth 2 times daily for 30 days. . 60 capsule 0    naproxen (NAPROSYN) 500 MG tablet TK 1 T PO BID WITH MEALS  0    lisinopril (PRINIVIL;ZESTRIL) 5 MG tablet Take 1 tablet by mouth daily 30 tablet 0    metoprolol succinate (TOPROL XL) 50 MG extended release tablet Take 1 tablet by mouth daily 30 tablet 0    VOLTAREN 1 % GEL APPLY 4 GRAMS TOPICALLY FOUR TIMES DAILY 500 g 0    peppermint oil liquid Take 1 capsule by mouth as needed (FOR STOMACH UPSET)       albuterol sulfate  (90 BASE) MCG/ACT inhaler Inhale 2 puffs into the lungs every 6 hours as needed for Wheezing      levothyroxine (SYNTHROID) 75 MCG tablet Take 75 mcg by mouth Daily      Vitamin D (CHOLECALCIFEROL) 1000 UNITS CAPS capsule Take 1,000 Units by mouth daily      aspirin 81 MG tablet Take 81 mg by mouth daily      atorvastatin (LIPITOR) 20 MG tablet Take 20 mg by mouth nightly.  tamsulosin (FLOMAX) 0.4 MG capsule Take 0.4 mg by mouth 2 times daily.  finasteride (PROSCAR) 5 MG tablet Take 5 mg by mouth daily.  therapeutic multivitamin-minerals (THERAGRAN-M) tablet Take 1 tablet by mouth daily. No current facility-administered medications for this visit. Review of Systems:  Relevant review of systems reviewed and available in the patient's chart      Vital Signs:  /72   Pulse 92   Ht 5' 7.01\" (1.702 m)   Wt 244 lb 0.8 oz (110.7 kg)   BMI 38.21 kg/m²     General Exam:   Constitutional: Patient is adequately groomed with no evidence of malnutrition  DTRs: Deep tendon reflexes are intact  Mental Status: The patient is oriented to time, place and person. The patient's mood and affect are appropriate. Lymphatic: The lymphatic examination bilaterally reveals all areas to be without enlargement or induration. Vascular: Examination reveals no swelling or calf tenderness. Peripheral pulses are palpable and 2+. Neurological: The patient has good coordination. There is no weakness or sensory deficit. Left Hip Examination:    Inspection:  There is a well-healed surgical incision without signs of infection. Leg length is appropriate. Palpation:  Nontender to palpation    Range of Motion:  Full active and passive range of motion of the hip without reproducible pain in the groin or lateral hip. Strength:  Strength with flexion and abduction is 5/5. Special Tests:  Negative straight leg raise against resistance. Skin: There are no rashes, ulcerations or lesions.     Gait: Normal gait pattern    Reflex normal deep tendon reflexes    Additional Comments:       Additional Examinations:         Contralateral Exam: Examination of the right hip reveals intact skin. The patient demonstrates full painless range of motion with regards to flexion, abduction, internal and external rotation. There is no tenderness about the greater trochanter. There is a negative straight leg raise against resistance. Strength is 5/5 throughout all planes. Radiology:     X-rays obtained and reviewed in office:  Views 2 views including AP pelvis and lateral  Location left hip   Impression there is excellent alignment of the prosthesis with no evidence of any periprosthetic fractures or loosening. No polyethylene wear. Impression:  Encounter Diagnosis   Name Primary?  Left hip pain Yes       Office Procedures:  Orders Placed This Encounter   Procedures    XR HIP LEFT (2-3 VIEWS)       Treatment Plan: At this time the patient is doing very well at 1.5 year postop. Would recommend routine follow up every 4-5 years unless there is a problem. It is recommended they continue with oral anti-biotics for dental prophylaxis and maintaining healthy body weight.

## 2018-08-08 ENCOUNTER — HOSPITAL ENCOUNTER (OUTPATIENT)
Age: 83
Discharge: HOME | End: 2018-08-08
Payer: MEDICARE

## 2018-08-08 PROCEDURE — 9900000038 HC CARDIAC REHAB PHASE 3 - MONTHLY

## 2018-08-17 ENCOUNTER — HOSPITAL ENCOUNTER (INPATIENT)
Age: 83
LOS: 1 days | Discharge: HOME | DRG: 682 | End: 2018-08-18
Attending: EMERGENCY MEDICINE | Admitting: INTERNAL MEDICINE
Payer: MEDICARE

## 2018-08-17 ENCOUNTER — APPOINTMENT (OUTPATIENT)
Dept: CT IMAGING | Age: 83
DRG: 682 | End: 2018-08-17
Payer: MEDICARE

## 2018-08-17 ENCOUNTER — APPOINTMENT (OUTPATIENT)
Dept: GENERAL RADIOLOGY | Age: 83
DRG: 682 | End: 2018-08-17
Payer: MEDICARE

## 2018-08-17 DIAGNOSIS — N17.9 ACUTE KIDNEY INJURY (HCC): ICD-10-CM

## 2018-08-17 DIAGNOSIS — R55 SYNCOPE AND COLLAPSE: Primary | ICD-10-CM

## 2018-08-17 DIAGNOSIS — E87.20 LACTIC ACIDOSIS: ICD-10-CM

## 2018-08-17 PROBLEM — R79.89 ELEVATED LACTIC ACID LEVEL: Status: ACTIVE | Noted: 2018-08-17

## 2018-08-17 LAB
A/G RATIO: 1.6 (ref 1.1–2.2)
ALBUMIN SERPL-MCNC: 3.6 G/DL (ref 3.4–5)
ALP BLD-CCNC: 65 U/L (ref 40–129)
ALT SERPL-CCNC: 15 U/L (ref 10–40)
ANION GAP SERPL CALCULATED.3IONS-SCNC: 14 MMOL/L (ref 3–16)
AST SERPL-CCNC: 27 U/L (ref 15–37)
BASOPHILS ABSOLUTE: 0.1 K/UL (ref 0–0.2)
BASOPHILS RELATIVE PERCENT: 0.7 %
BILIRUB SERPL-MCNC: 1 MG/DL (ref 0–1)
BILIRUBIN URINE: NEGATIVE
BLOOD, URINE: NEGATIVE
BUN BLDV-MCNC: 28 MG/DL (ref 7–20)
CALCIUM SERPL-MCNC: 7.7 MG/DL (ref 8.3–10.6)
CHLORIDE BLD-SCNC: 102 MMOL/L (ref 99–110)
CLARITY: CLEAR
CO2: 25 MMOL/L (ref 21–32)
COLOR: YELLOW
CREAT SERPL-MCNC: 1.4 MG/DL (ref 0.8–1.3)
CREATININE URINE: 81.5 MG/DL (ref 39–259)
EOSINOPHILS ABSOLUTE: 0.2 K/UL (ref 0–0.6)
EOSINOPHILS RELATIVE PERCENT: 2.2 %
GFR AFRICAN AMERICAN: 59
GFR NON-AFRICAN AMERICAN: 49
GLOBULIN: 2.3 G/DL
GLUCOSE BLD-MCNC: 147 MG/DL (ref 70–99)
GLUCOSE URINE: NEGATIVE MG/DL
HCT VFR BLD CALC: 35.9 % (ref 40.5–52.5)
HEMOGLOBIN: 12.2 G/DL (ref 13.5–17.5)
INR BLD: 1.06 (ref 0.86–1.14)
KETONES, URINE: NEGATIVE MG/DL
LACTIC ACID: 2.3 MMOL/L (ref 0.4–2)
LEUKOCYTE ESTERASE, URINE: NEGATIVE
LYMPHOCYTES ABSOLUTE: 1.6 K/UL (ref 1–5.1)
LYMPHOCYTES RELATIVE PERCENT: 17.3 %
MCH RBC QN AUTO: 30.9 PG (ref 26–34)
MCHC RBC AUTO-ENTMCNC: 34 G/DL (ref 31–36)
MCV RBC AUTO: 91.1 FL (ref 80–100)
MICROSCOPIC EXAMINATION: NORMAL
MONOCYTES ABSOLUTE: 0.6 K/UL (ref 0–1.3)
MONOCYTES RELATIVE PERCENT: 6.7 %
NEUTROPHILS ABSOLUTE: 6.7 K/UL (ref 1.7–7.7)
NEUTROPHILS RELATIVE PERCENT: 73.1 %
NITRITE, URINE: NEGATIVE
PDW BLD-RTO: 14.3 % (ref 12.4–15.4)
PH UA: 5.5
PLATELET # BLD: 215 K/UL (ref 135–450)
PMV BLD AUTO: 10.1 FL (ref 5–10.5)
POTASSIUM SERPL-SCNC: 4.8 MMOL/L (ref 3.5–5.1)
PRO-BNP: 154 PG/ML (ref 0–449)
PROTEIN UA: NEGATIVE MG/DL
PROTHROMBIN TIME: 12.1 SEC (ref 9.8–13)
RBC # BLD: 3.94 M/UL (ref 4.2–5.9)
SODIUM BLD-SCNC: 141 MMOL/L (ref 136–145)
SODIUM URINE: 57 MMOL/L
SPECIFIC GRAVITY UA: 1.01
TOTAL PROTEIN: 5.9 G/DL (ref 6.4–8.2)
TROPONIN: <0.01 NG/ML
URINE TYPE: NORMAL
UROBILINOGEN, URINE: 0.2 E.U./DL
WBC # BLD: 9.2 K/UL (ref 4–11)

## 2018-08-17 PROCEDURE — 80053 COMPREHEN METABOLIC PANEL: CPT

## 2018-08-17 PROCEDURE — 1200000000 HC SEMI PRIVATE

## 2018-08-17 PROCEDURE — 83935 ASSAY OF URINE OSMOLALITY: CPT

## 2018-08-17 PROCEDURE — 83880 ASSAY OF NATRIURETIC PEPTIDE: CPT

## 2018-08-17 PROCEDURE — 94664 DEMO&/EVAL PT USE INHALER: CPT

## 2018-08-17 PROCEDURE — 84484 ASSAY OF TROPONIN QUANT: CPT

## 2018-08-17 PROCEDURE — 94761 N-INVAS EAR/PLS OXIMETRY MLT: CPT

## 2018-08-17 PROCEDURE — 93005 ELECTROCARDIOGRAM TRACING: CPT | Performed by: EMERGENCY MEDICINE

## 2018-08-17 PROCEDURE — 99285 EMERGENCY DEPT VISIT HI MDM: CPT

## 2018-08-17 PROCEDURE — 93010 ELECTROCARDIOGRAM REPORT: CPT | Performed by: INTERNAL MEDICINE

## 2018-08-17 PROCEDURE — 94640 AIRWAY INHALATION TREATMENT: CPT

## 2018-08-17 PROCEDURE — 6370000000 HC RX 637 (ALT 250 FOR IP): Performed by: NURSE PRACTITIONER

## 2018-08-17 PROCEDURE — 81003 URINALYSIS AUTO W/O SCOPE: CPT

## 2018-08-17 PROCEDURE — 70450 CT HEAD/BRAIN W/O DYE: CPT

## 2018-08-17 PROCEDURE — 2580000003 HC RX 258: Performed by: INTERNAL MEDICINE

## 2018-08-17 PROCEDURE — 84300 ASSAY OF URINE SODIUM: CPT

## 2018-08-17 PROCEDURE — 2580000003 HC RX 258: Performed by: EMERGENCY MEDICINE

## 2018-08-17 PROCEDURE — 85610 PROTHROMBIN TIME: CPT

## 2018-08-17 PROCEDURE — 82570 ASSAY OF URINE CREATININE: CPT

## 2018-08-17 PROCEDURE — 94150 VITAL CAPACITY TEST: CPT

## 2018-08-17 PROCEDURE — 83605 ASSAY OF LACTIC ACID: CPT

## 2018-08-17 PROCEDURE — 6370000000 HC RX 637 (ALT 250 FOR IP): Performed by: INTERNAL MEDICINE

## 2018-08-17 PROCEDURE — 85025 COMPLETE CBC W/AUTO DIFF WBC: CPT

## 2018-08-17 PROCEDURE — 71045 X-RAY EXAM CHEST 1 VIEW: CPT

## 2018-08-17 RX ORDER — HEPARIN SODIUM 5000 [USP'U]/ML
5000 INJECTION, SOLUTION INTRAVENOUS; SUBCUTANEOUS EVERY 8 HOURS SCHEDULED
Status: DISCONTINUED | OUTPATIENT
Start: 2018-08-18 | End: 2018-08-18 | Stop reason: HOSPADM

## 2018-08-17 RX ORDER — SODIUM CHLORIDE 9 MG/ML
INJECTION, SOLUTION INTRAVENOUS CONTINUOUS
Status: DISCONTINUED | OUTPATIENT
Start: 2018-08-17 | End: 2018-08-18 | Stop reason: HOSPADM

## 2018-08-17 RX ORDER — ALBUTEROL SULFATE 90 UG/1
2 AEROSOL, METERED RESPIRATORY (INHALATION) EVERY 6 HOURS PRN
Status: DISCONTINUED | OUTPATIENT
Start: 2018-08-17 | End: 2018-08-18 | Stop reason: HOSPADM

## 2018-08-17 RX ORDER — DOCUSATE SODIUM 100 MG/1
100 CAPSULE, LIQUID FILLED ORAL 2 TIMES DAILY
Status: DISCONTINUED | OUTPATIENT
Start: 2018-08-17 | End: 2018-08-18 | Stop reason: HOSPADM

## 2018-08-17 RX ORDER — TAMSULOSIN HYDROCHLORIDE 0.4 MG/1
0.4 CAPSULE ORAL 2 TIMES DAILY
Status: DISCONTINUED | OUTPATIENT
Start: 2018-08-17 | End: 2018-08-18 | Stop reason: HOSPADM

## 2018-08-17 RX ORDER — SODIUM CHLORIDE 0.9 % (FLUSH) 0.9 %
10 SYRINGE (ML) INJECTION EVERY 12 HOURS SCHEDULED
Status: DISCONTINUED | OUTPATIENT
Start: 2018-08-17 | End: 2018-08-18 | Stop reason: HOSPADM

## 2018-08-17 RX ORDER — ASPIRIN 81 MG/1
81 TABLET ORAL DAILY
Status: DISCONTINUED | OUTPATIENT
Start: 2018-08-17 | End: 2018-08-18 | Stop reason: HOSPADM

## 2018-08-17 RX ORDER — 0.9 % SODIUM CHLORIDE 0.9 %
1000 INTRAVENOUS SOLUTION INTRAVENOUS ONCE
Status: COMPLETED | OUTPATIENT
Start: 2018-08-17 | End: 2018-08-17

## 2018-08-17 RX ORDER — ONDANSETRON 2 MG/ML
4 INJECTION INTRAMUSCULAR; INTRAVENOUS EVERY 6 HOURS PRN
Status: DISCONTINUED | OUTPATIENT
Start: 2018-08-17 | End: 2018-08-18 | Stop reason: HOSPADM

## 2018-08-17 RX ORDER — SODIUM CHLORIDE 0.9 % (FLUSH) 0.9 %
10 SYRINGE (ML) INJECTION PRN
Status: DISCONTINUED | OUTPATIENT
Start: 2018-08-17 | End: 2018-08-18 | Stop reason: HOSPADM

## 2018-08-17 RX ORDER — M-VIT,TX,IRON,MINS/CALC/FOLIC 27MG-0.4MG
1 TABLET ORAL DAILY
Status: DISCONTINUED | OUTPATIENT
Start: 2018-08-17 | End: 2018-08-18 | Stop reason: HOSPADM

## 2018-08-17 RX ORDER — FINASTERIDE 5 MG/1
5 TABLET, FILM COATED ORAL DAILY
Status: DISCONTINUED | OUTPATIENT
Start: 2018-08-17 | End: 2018-08-18 | Stop reason: HOSPADM

## 2018-08-17 RX ORDER — ATORVASTATIN CALCIUM 10 MG/1
20 TABLET, FILM COATED ORAL NIGHTLY
Status: DISCONTINUED | OUTPATIENT
Start: 2018-08-17 | End: 2018-08-18 | Stop reason: HOSPADM

## 2018-08-17 RX ADMIN — TAMSULOSIN HYDROCHLORIDE 0.4 MG: 0.4 CAPSULE ORAL at 20:50

## 2018-08-17 RX ADMIN — DOCUSATE SODIUM 100 MG: 100 CAPSULE, LIQUID FILLED ORAL at 20:50

## 2018-08-17 RX ADMIN — SODIUM CHLORIDE: 9 INJECTION, SOLUTION INTRAVENOUS at 18:26

## 2018-08-17 RX ADMIN — Medication 2 PUFF: at 20:14

## 2018-08-17 RX ADMIN — SODIUM CHLORIDE 1000 ML: 9 INJECTION, SOLUTION INTRAVENOUS at 17:17

## 2018-08-17 RX ADMIN — ATORVASTATIN CALCIUM 20 MG: 10 TABLET, FILM COATED ORAL at 20:50

## 2018-08-17 ASSESSMENT — ENCOUNTER SYMPTOMS
ABDOMINAL PAIN: 0
CHEST TIGHTNESS: 0
SHORTNESS OF BREATH: 0
VOMITING: 0
DIARRHEA: 0
NAUSEA: 0
COLOR CHANGE: 0
BACK PAIN: 0

## 2018-08-17 ASSESSMENT — PAIN SCALES - GENERAL: PAINLEVEL_OUTOF10: 0

## 2018-08-17 NOTE — ED PROVIDER NOTES
[address]  eMERGENCY dEPARTMENT eNCOUnter        Pt Name: Katelyn Weiner  MRN: [id number]  [place] 1935  Date of evaluation: 8/17/2018  Provider: Matthew Hampton MD  PCP: Brandon Mclaughlin MD      CHIEF COMPLAINT       Chief Complaint   Patient presents with    Loss of Consciousness     passed out last night per wife       HISTORY OF PRESENT ILLNESS   (Location/Symptom, Timing/Onset, Context/Setting, Quality, Duration, Modifying Factors, Severity)  Note limiting factors. Katelyn Weiner is a [de-identified] y.o. male sent over by his primary doctor due to concern for having a possible syncopal event last night that lasted 30 seconds at the dinner table but also wandering around his house this morning and hallucinating that somebody was installing a door and also urinating on the bathroom floor which is abnormal for him. His wife states he has never had a syncopal event before. He was recently admitted roughly 2 weeks ago for ataxia but reports no stroke found on MRI. No new medication changes per family. He has hallucinated in the past but has been acting more disoriented today than normal.  He does complain about chronic hip pain but no new changes today. He normally breathes rather heavily and no changes with this today. He denies any chest pain, shortness of breath, headache, abdominal pain, recent falls, other concerning symptoms. He does complain about increased trouble with walking today and his wife agrees with this. REVIEW OF SYSTEMS    (2-9 systems for level 4, 10 or more for level 5)     Review of Systems   Constitutional: Positive for fatigue. Negative for chills and fever. HENT: Negative for congestion. Eyes: Negative for visual disturbance. Respiratory: Negative for chest tightness and shortness of breath. Cardiovascular: Negative for chest pain. Gastrointestinal: Negative for abdominal pain, diarrhea, nausea and vomiting.    Genitourinary: Negative for difficulty urinating. Musculoskeletal: Positive for arthralgias (chronic bilateral hips ) and gait problem (chronic). Negative for back pain and neck pain. Skin: Negative for color change. Neurological: Positive for dizziness, syncope, weakness (generalized) and light-headedness. Negative for headaches. Psychiatric/Behavioral: Positive for confusion and hallucinations. Positives and Pertinent negatives as per HPI. PAST MEDICAL HISTORY     Past Medical History:   Diagnosis Date    Arthritis     Asthma     BPH     CAD (coronary artery disease)     Chest pain     Hearing decreased     HEARING AIDS    Hyperlipidemia     Hypertension     Need for post exposure prophylaxis for rabies 1980    Thyroid disease     hypothyroidism    Wears glasses          SURGICAL HISTORY       Past Surgical History:   Procedure Laterality Date    BACK SURGERY      DISCECTOMY    BACK SURGERY  11/2017    lamenectomy, facetotomy, decompression l2-3, l3-4, l4-5    CATARACT REMOVAL Left 6/17/16    CATARACT REMOVAL WITH IMPLANT Right     COLONOSCOPY  2011    COLONOSCOPY  6/28/11    polyps    COLONOSCOPY  8/14    polyps    CORONARY ANGIOPLASTY WITH STENT PLACEMENT  0718/2011    CYST REMOVAL  1/3/2014    central back    FINGER SURGERY      GROWTH ON FIRST FINGER REMOVED    FRACTURE SURGERY  age 14    nose    HIP SURGERY Left 04/19/2017    LEFT LATERAL TOTAL HIP REPLACEMENT WITH CELLSAVER AND    JOINT REPLACEMENT Right 2006    TKR    JOINT REPLACEMENT Left 11/2013    TKR    JOINT REPLACEMENT Right 2008    THR    KNEE SURGERY      LUMBAR LAMINECTOMY N/A 01/06/2017    micro lumbar laminectomy L2-L3         CURRENT MEDICATIONS       Current Discharge Medication List      CONTINUE these medications which have NOT CHANGED    Details   pregabalin (LYRICA) 75 MG capsule Take 1 capsule by mouth 2 times daily for 30 days. [place]: 60 capsule, Refills: 0    Associated Diagnoses: Status post total replacement of Scale  Eye Opening: Spontaneous  Best Verbal Response: Oriented  Best Motor Response: Obeys commands  Emily Coma Scale Score: 15        PHYSICAL EXAM    (up to 7 for level 4, 8 or more for level 5)     ED Triage Vitals   BP Temp Temp src Pulse Resp SpO2 Height Weight   -- -- -- -- -- -- -- --       Physical Exam   Constitutional: He is oriented to person, place, and time. He appears well-developed and well-nourished. He is active and cooperative. Non-toxic appearance. He does not have a sickly appearance. He does not appear ill. No distress. HENT:   Head: Normocephalic and atraumatic. Nose: Nose normal.   Mouth/Throat: Mucous membranes are dry. No oropharyngeal exudate. Eyes: Pupils are equal, round, and reactive to light. EOM are normal. Right eye exhibits no discharge. Left eye exhibits no discharge. No scleral icterus. Neck: Trachea normal, normal range of motion and full passive range of motion without pain. Neck supple. No neck rigidity. No tracheal deviation, no edema, no erythema and normal range of motion present. Cardiovascular: Normal rate, regular rhythm, intact distal pulses and normal pulses. Exam reveals no gallop and no friction rub. Pulmonary/Chest: Effort normal and breath sounds normal. No accessory muscle usage or stridor. No tachypnea and no bradypnea. No respiratory distress. He has no decreased breath sounds. He has no wheezes. He has no rhonchi. He has no rales. He exhibits no tenderness. Slightly labored breathing but breath sounds do sound clear on exam   Abdominal: Soft. Bowel sounds are normal. He exhibits no distension. There is no tenderness. There is no rebound and no guarding. Musculoskeletal: He exhibits no edema, tenderness or deformity. Neurological: He is alert and oriented to person, place, and time. He has normal strength. He displays no atrophy and no tremor. No sensory deficit. He exhibits normal muscle tone. He displays no seizure activity.  GCS eye subscore is 4. GCS verbal subscore is 5. GCS motor subscore is 6. Skin: Skin is warm, dry and intact. No abrasion, no bruising, no burn, no ecchymosis, no laceration, no lesion, no petechiae and no rash noted. He is not diaphoretic. No erythema. No pallor. Psychiatric: He has a normal mood and affect. Nursing note and vitals reviewed.           DIAGNOSTIC RESULTS   LABS:    Labs Reviewed   CBC WITH AUTO DIFFERENTIAL - Abnormal; Notable for the following:        Result Value    RBC 3.94 (*)     Hemoglobin 12.2 (*)     Hematocrit 35.9 (*)     All other components within normal limits    Narrative:     Performed at:  [place] [address] [address]   Phone [phone]   COMPREHENSIVE METABOLIC PANEL - Abnormal; Notable for the following:     Glucose 147 (*)     BUN 28 (*)     CREATININE 1.4 (*)     GFR Non- 49 (*)     GFR  59 (*)     Calcium 7.7 (*)     Total Protein 5.9 (*)     All other components within normal limits    Narrative:     Performed at:  [place] [address] [address]   Phone [phone]   LACTIC ACID, PLASMA - Abnormal; Notable for the following:     Lactic Acid 2.3 (*)     All other components within normal limits    Narrative:     Performed at:  [place] [address] [address]   Phone [phone]   PROTIME-INR    Narrative:     Performed at:  [place] [address] [address]   Phone [phone] PEPTIDE    Narrative:     Performed at:  [place] [address] [address]   Phone [phone]   URINALYSIS    Narrative:     Performed at:  [place] [address] [address]   Phone [phone]   TROPONIN    Narrative:     Performed 70 79    Resp: 16 16 16    Temp: 98 °F (36.7 °C) 97.9 °F (36.6 °C) 98.1 °F (36.7 °C)    TempSrc: Oral Oral Oral    SpO2: 98% 97% 95% 97%   Weight:  267 lb 13.7 oz (121.5 kg)     Height:  5' 7\" (1.702 m)         Patient was given the following medications:  Medications   levothyroxine (SYNTHROID) tablet 75 mcg (75 mcg Oral Not Given 8/17/18 1846)   finasteride (PROSCAR) tablet 5 mg (5 mg Oral Not Given 8/17/18 1846)   atorvastatin (LIPITOR) tablet 20 mg (20 mg Oral Given 8/17/18 2050)   aspirin EC tablet 81 mg (81 mg Oral Not Given 8/17/18 1827)   tamsulosin (FLOMAX) capsule 0.4 mg (0.4 mg Oral Given 8/17/18 2050)   therapeutic multivitamin-minerals 1 tablet (1 tablet Oral Not Given 8/17/18 1848)   vitamin D (CHOLECALCIFEROL) tablet 1,000 Units (1,000 Units Oral Not Given 8/17/18 1848)   sodium chloride flush 0.9 % injection 10 mL (10 mLs Intravenous Not Given 8/17/18 2050)   sodium chloride flush 0.9 % injection 10 mL (not administered)   docusate sodium (COLACE) capsule 100 mg (100 mg Oral Given 8/17/18 2050)   ondansetron (ZOFRAN) injection 4 mg (not administered)   heparin (porcine) injection 5,000 Units (not administered)   0.9 % sodium chloride infusion ( Intravenous New Bag 8/17/18 1826)   albuterol sulfate  (90 Base) MCG/ACT inhaler 2 puff (2 puffs Inhalation Given 8/17/18 2014)   0.9 % sodium chloride bolus (0 mLs Intravenous Stopped 8/17/18 1818)     Patient was evaluated due to concern for syncopal event which was the first time this ever happened per wife. Labs were concerning for acute kidney injury and he did have a lactic acidosis. No obvious signs of infection and no signs of pneumonia or urinary tract infection. I am concerned that he is dehydrated.   However, with new syncopal event with known coronary disease, do believe he'll need further evaluation the hospital.  His wife also states he has been hallucinating more and wandering around and therefore he may be starting to have early-onset

## 2018-08-17 NOTE — ED NOTES
Medication reconciliation incomplete due to patient/family's inability to provide complete list.  Will need follow up.        Oliver Carbajal RN  08/17/18 [id number]

## 2018-08-17 NOTE — H&P
mouth 2 times daily. 1/28/11   Historical Provider, MD   finasteride (PROSCAR) 5 MG tablet Take 5 mg by mouth daily. Historical Provider, MD   therapeutic multivitamin-minerals (THERAGRAN-M) tablet Take 1 tablet by mouth daily. Historical Provider, MD       Allergies:  Erythromycin; Penicillins; Sulfa antibiotics; Tetanus toxoids; and Tetracyclines & related    Social History:    The patient currently lives At home and uses a cane and is independent of IADLs    TOBACCO:   reports that he quit smoking about 58 years ago. His smoking use included Cigarettes. He has a 1.00 pack-year smoking history. He has never used smokeless tobacco.  ETOH:   reports that he drinks alcohol. Family History:    Reviewed in detail and negative for DM, CAD, Cancer, CVA. Positive as follows:    Family History   Problem Relation Age of Onset    Cancer Father         bladder       REVIEW OF SYSTEMS:   Pertinent positives as noted in the HPI. All other systems reviewed and negative. PHYSICAL EXAM PERFORMED:  BP 93/69   Pulse 68   Temp 98 °F (36.7 °C) (Oral)   Resp 16   Ht 5' 7\" (1.702 m)   SpO2 98%   BMI 38.22 kg/m²     General appearance:  No apparent distress, appears stated age and cooperative. HEENT:  Normal cephalic, atraumatic without obvious deformity. Pupils equal, round, and reactive to light. Extra ocular muscles intact. Conjunctivae/corneas clear. Neck: Supple, with full range of motion. No jugular venous distention. Trachea midline. Respiratory:  Normal respiratory effort. Occasional scattered wheezes noted. Cardiovascular:  Regular rate and rhythm with normal S1/S2 without murmurs, rubs or gallops. Abdomen: Soft, non-tender, non-distended with normal bowel sounds. Musculoskeletal:  No clubbing, cyanosis or edema bilaterally. Full range of motion without deformity. Skin: Skin color, texture, turgor normal.  No rashes or lesions.   Neurologic:  Neurovascularly intact without any focal sensory/motor deficits. Cranial nerves: II-XII intact, grossly non-focal.  Psychiatric:  Alert and oriented, thought content appropriate, normal insight  Capillary Refill: Brisk,< 3 seconds   Peripheral Pulses: +2 palpable, equal bilaterally       Labs:     Recent Labs      08/17/18   1334   WBC  9.2   HGB  12.2*   HCT  35.9*   PLT  215     Recent Labs      08/17/18   1334   NA  141   K  4.8   CL  102   CO2  25   BUN  28*   CREATININE  1.4*   CALCIUM  7.7*     Recent Labs      08/17/18   1334   AST  27   ALT  15   BILITOT  1.0   ALKPHOS  65     Recent Labs      08/17/18   1334   INR  1.06     Recent Labs      08/17/18   1334   TROPONINI  <0.01       Urinalysis:    Lab Results   Component Value Date    NITRU Negative 08/17/2018    BLOODU Negative 08/17/2018    SPECGRAV 1.015 08/17/2018    GLUCOSEU Negative 08/17/2018     EKG : NSR, normal intervals, not significant change compared to prior EKG dated 8/2/2018      Radiology:   I have reviewed the imaging  with the following interpretation:   CT HEAD WO CONTRAST   Final Result   No acute intracranial abnormality. Diffuse atrophic changes with findings suggesting chronic microvascular   ischemia         XR CHEST PORTABLE   Final Result   No active cardiopulmonary disease           Active Hospital Problems    Diagnosis Date Noted    Essential hypertension [I10] 07/15/2011     Priority: Medium    Elevated lactic acid level [R79.89] 08/17/2018    SERA (acute kidney injury) ([facility] [address] [N17.9]     Acquired hypothyroidism [E03.9]     Acute encephalopathy [G93.40]      ASSESSMENT/ PLAN:  1. SERA secondary to Pre-renal from Increased Diuresis (PTA)   - BUN/creatinine 28/1.4 on admission with baseline normal renal functions  - Patient saw his PCP on 8/16/18 and had Demadex dose increased to twice a day  - UA pending ; elevated lactic acid 2.4 on admission with borderline blood pressures  -  we'll hold further diuresis.   Received 1 L fluid bolus in ED; continue IV fluids at 100 mL/h

## 2018-08-18 VITALS
SYSTOLIC BLOOD PRESSURE: 108 MMHG | WEIGHT: 267.86 LBS | BODY MASS INDEX: 42.04 KG/M2 | HEIGHT: 67 IN | OXYGEN SATURATION: 93 % | TEMPERATURE: 97.7 F | RESPIRATION RATE: 15 BRPM | HEART RATE: 63 BPM | DIASTOLIC BLOOD PRESSURE: 61 MMHG

## 2018-08-18 LAB
ALBUMIN SERPL-MCNC: 3.1 G/DL (ref 3.4–5)
ANION GAP SERPL CALCULATED.3IONS-SCNC: 10 MMOL/L (ref 3–16)
BUN BLDV-MCNC: 20 MG/DL (ref 7–20)
CALCIUM SERPL-MCNC: 7.9 MG/DL (ref 8.3–10.6)
CHLORIDE BLD-SCNC: 107 MMOL/L (ref 99–110)
CO2: 25 MMOL/L (ref 21–32)
CREAT SERPL-MCNC: 1.1 MG/DL (ref 0.8–1.3)
GFR AFRICAN AMERICAN: >60
GFR NON-AFRICAN AMERICAN: >60
GLUCOSE BLD-MCNC: 104 MG/DL (ref 70–99)
HCT VFR BLD CALC: 33.6 % (ref 40.5–52.5)
HEMOGLOBIN: 11.3 G/DL (ref 13.5–17.5)
LACTIC ACID: 1 MMOL/L (ref 0.4–2)
MCH RBC QN AUTO: 31 PG (ref 26–34)
MCHC RBC AUTO-ENTMCNC: 33.5 G/DL (ref 31–36)
MCV RBC AUTO: 92.4 FL (ref 80–100)
OSMOLALITY URINE: 482 MOSM/KG (ref 390–1070)
PDW BLD-RTO: 14.4 % (ref 12.4–15.4)
PHOSPHORUS: 2.8 MG/DL (ref 2.5–4.9)
PLATELET # BLD: 160 K/UL (ref 135–450)
PMV BLD AUTO: 8.7 FL (ref 5–10.5)
POTASSIUM SERPL-SCNC: 4.2 MMOL/L (ref 3.5–5.1)
RBC # BLD: 3.63 M/UL (ref 4.2–5.9)
SODIUM BLD-SCNC: 142 MMOL/L (ref 136–145)
WBC # BLD: 6.2 K/UL (ref 4–11)

## 2018-08-18 PROCEDURE — 97535 SELF CARE MNGMENT TRAINING: CPT

## 2018-08-18 PROCEDURE — 83605 ASSAY OF LACTIC ACID: CPT

## 2018-08-18 PROCEDURE — G8988 SELF CARE GOAL STATUS: HCPCS

## 2018-08-18 PROCEDURE — G8987 SELF CARE CURRENT STATUS: HCPCS

## 2018-08-18 PROCEDURE — 97530 THERAPEUTIC ACTIVITIES: CPT

## 2018-08-18 PROCEDURE — 6360000002 HC RX W HCPCS: Performed by: INTERNAL MEDICINE

## 2018-08-18 PROCEDURE — 36415 COLL VENOUS BLD VENIPUNCTURE: CPT

## 2018-08-18 PROCEDURE — 80069 RENAL FUNCTION PANEL: CPT

## 2018-08-18 PROCEDURE — 97166 OT EVAL MOD COMPLEX 45 MIN: CPT

## 2018-08-18 PROCEDURE — 6370000000 HC RX 637 (ALT 250 FOR IP): Performed by: INTERNAL MEDICINE

## 2018-08-18 PROCEDURE — 2580000003 HC RX 258: Performed by: INTERNAL MEDICINE

## 2018-08-18 PROCEDURE — 6370000000 HC RX 637 (ALT 250 FOR IP): Performed by: NURSE PRACTITIONER

## 2018-08-18 PROCEDURE — 85027 COMPLETE CBC AUTOMATED: CPT

## 2018-08-18 RX ORDER — CHOLECALCIFEROL (VITAMIN D3) 125 MCG
5 CAPSULE ORAL NIGHTLY PRN
Status: DISCONTINUED | OUTPATIENT
Start: 2018-08-18 | End: 2018-08-18 | Stop reason: HOSPADM

## 2018-08-18 RX ORDER — METOPROLOL SUCCINATE 25 MG/1
25 TABLET, EXTENDED RELEASE ORAL DAILY
Qty: 30 TABLET | Refills: 1 | Status: SHIPPED | OUTPATIENT
Start: 2018-08-18

## 2018-08-18 RX ADMIN — HEPARIN SODIUM 5000 UNITS: 5000 INJECTION, SOLUTION INTRAVENOUS; SUBCUTANEOUS at 06:50

## 2018-08-18 RX ADMIN — VITAMIN D, TAB 1000IU (100/BT) 1000 UNITS: 25 TAB at 09:26

## 2018-08-18 RX ADMIN — DOCUSATE SODIUM 100 MG: 100 CAPSULE, LIQUID FILLED ORAL at 09:27

## 2018-08-18 RX ADMIN — Medication 1 TABLET: at 09:27

## 2018-08-18 RX ADMIN — Medication 1 LOZENGE: at 01:22

## 2018-08-18 RX ADMIN — ASPIRIN 81 MG: 81 TABLET, COATED ORAL at 09:27

## 2018-08-18 RX ADMIN — SODIUM CHLORIDE, PRESERVATIVE FREE 10 ML: 5 INJECTION INTRAVENOUS at 09:27

## 2018-08-18 RX ADMIN — FINASTERIDE 5 MG: 5 TABLET, FILM COATED ORAL at 09:26

## 2018-08-18 RX ADMIN — TAMSULOSIN HYDROCHLORIDE 0.4 MG: 0.4 CAPSULE ORAL at 09:26

## 2018-08-18 RX ADMIN — Medication 5 MG: at 01:22

## 2018-08-18 RX ADMIN — LEVOTHYROXINE SODIUM 75 MCG: 50 TABLET ORAL at 06:50

## 2018-08-18 ASSESSMENT — PAIN DESCRIPTION - LOCATION: LOCATION: HIP

## 2018-08-18 ASSESSMENT — PAIN DESCRIPTION - PAIN TYPE: TYPE: ACUTE PAIN

## 2018-08-18 ASSESSMENT — PAIN SCALES - GENERAL: PAINLEVEL_OUTOF10: 3

## 2018-08-18 NOTE — PROGRESS NOTES
Assessment complete. VSS. Pt resting in bed. Per daughter, pt gets confused at night and will sundown. Bed alarm in place. Alert and oriented at this time. Will continue to monitor.

## 2018-08-18 NOTE — PROGRESS NOTES
Occupational Therapy   Occupational Therapy Initial Assessment  Date: 2018   Patient Name: Alejandro Reid  MRN: [id number]     : 1935    Date of Service: 2018    Discharge Recommendations:  Home with Home health OT, 24 hour supervision or assist  OT Equipment Recommendations  Equipment Needed: No      Patient Diagnosis(es): The primary encounter diagnosis was Syncope and collapse. Diagnoses of Acute kidney injury ([address] and Lactic acidosis were also pertinent to this visit. has a past medical history of Arthritis; Asthma; BPH; CAD (coronary artery disease); Chest pain; Hearing decreased; Hyperlipidemia; Hypertension; Need for post exposure prophylaxis for rabies; Thyroid disease; and Wears glasses. has a past surgical history that includes Coronary angioplasty with stent (); fracture surgery (age 14); joint replacement (Right, ); joint replacement (Left, 2013); joint replacement (Right, ); cyst removal (1/3/2014); Colonoscopy (); Colonoscopy (13); Colonoscopy (); Finger surgery; Cataract removal with implant (Right); Cataract removal (Left, 16); lumbar laminectomy (N/A, 2017); hip surgery (Left, 2017); back surgery; back surgery (2017); and knee surgery.            Restrictions  Restrictions/Precautions  Restrictions/Precautions: General Precautions, Up Ad [place]    Subjective   General  Chart Reviewed: Yes  Patient assessed for rehabilitation services?: Yes  Family / Caregiver Present: Yes (daughter-trauma RN)  Referring Practitioner: Ruchi Walker MD  Diagnosis: syncope/SERA  Subjective  Subjective: Patient agreeable to OT evaluation  General Comment  Comments: RN cleared for treatment  Pain Assessment  Patient Currently in Pain: Denies (at rest, 3-4/10 in L hip with movement-new)  Pain Assessment: 0-10  Pain Level: 3  Pain Type: Acute pain  Pain Location: Hip     Social/Functional History  Social/Functional History  Lives With: Status (): At least 20 percent but less than 40 percent impaired, limited or restricted  Self Care Goal Status (): At least 1 percent but less than 20 percent impaired, limited or restricted    AM-PAC Score  AM-PAC Inpatient Daily Activity Raw Score: 22  AM-PAC Inpatient ADL T-Scale Score : 47.1  ADL Inpatient CMS 0-100% Score: 25.8  ADL Inpatient CMS G-Code Modifier : CJ    Goals  Short term goals  Time Frame for Short term goals: Until discharge unless otherwise specified  Short term goal 1: Patient will complete toilet transfer Mod Ind with LRAD in 2 visits  Short term goal 2: Patient will complete bed mobility Mod Ind with bed flat  Short term goal 3: Patient will complete UB/LB dressing Mod Ind  Short term goal 4: Patient will complete 3+ standing ADL tasks Mod Ind  Patient Goals   Patient goals : return to home today       Therapy Time   Individual Concurrent Group Co-treatment   Time In 1045         Time Out 1118         Minutes 33         Timed Code Treatment Minutes: 23 Minutes (10 min eval)    If patient is discharged prior to next OT session, this note will serve as discharge summary.    Diane Rosas, [id number]

## 2018-08-18 NOTE — PROGRESS NOTES
RESPIRATORY THERAPY ASSESSMENT    Name:  Danyell Deshpande  Record Number:  [id number]  Age: [de-identified] y.o. Gender: male  : 1935  Today's Date:  2018  Room:  [facility]0363-02    Assessment     Is the patient being admitted for a COPD or Asthma exacerbation? No   (If yes the patient will be seen every 4 hours for the first 24 hours and then reassessed)    Patient Admission Diagnosis      Allergies  Allergies   Allergen Reactions    Erythromycin Hives    Penicillins Hives    Sulfa Antibiotics Hives    Tetanus Toxoids Hives    Tetracyclines & Related        Minimum Predicted Vital Capacity:     990          Actual Vital Capacity:      1250          Pulmonary History:Asthma  Home Oxygen Therapy:  room air  Home Respiratory Therapy:Albuterol MDI PRN   Current Respiratory Therapy:  Albuterol MDI PRN          Respiratory Severity Index(RSI)   Patients with orders for inhalation medications, oxygen, or any therapeutic treatment modality will be placed on Respiratory Protocol. They will be assessed with the first treatment and at least every 72 hours thereafter. The following severity scale will be used to determine frequency of treatment intervention.     Smoking History: Smoking History Less than 1ppd or less than 15 pack year = 1    Social History  Social History   Substance Use Topics    Smoking status: Former Smoker     Packs/day: 1.00     Years: 1.00     Types: Cigarettes     Quit date: 1960    Smokeless tobacco: Never Used    Alcohol use Yes      Comment: nothing to drink x2 weeks, but previously drank 2-3 liquor daily       Recent Surgical History: None = 0  Past Surgical History  Past Surgical History:   Procedure Laterality Date    BACK SURGERY      DISCECTOMY    BACK SURGERY  2017    lamenectomy, facetotomy, decompression l2-3, l3-4, l4-5    CATARACT REMOVAL Left 16    CATARACT REMOVAL WITH IMPLANT Right     COLONOSCOPY      COLONOSCOPY  11    polyps    COLONOSCOPY 8/14    polyps    CORONARY ANGIOPLASTY WITH STENT PLACEMENT  0718/2011    CYST REMOVAL  1/3/2014    central back    FINGER SURGERY      GROWTH ON FIRST FINGER REMOVED    FRACTURE SURGERY  age 14    nose    HIP SURGERY Left 04/19/2017    LEFT LATERAL TOTAL HIP REPLACEMENT WITH CELLSAVER AND    JOINT REPLACEMENT Right 2006    TKR    JOINT REPLACEMENT Left 11/2013    TKR    JOINT REPLACEMENT Right 2008    THR    KNEE SURGERY      LUMBAR LAMINECTOMY N/A 01/06/2017    micro lumbar laminectomy L2-L3       Level of Consciousness: Alert, Oriented, and Cooperative = 0    Level of Activity: Walking unassisted = 0    Respiratory Pattern: Regular Pattern; RR 8-20 = 0    Breath Sounds: Clear = 0    Sputum   ,  ,    Cough: Strong, spontaneous, non-productive = 0    Vital Signs   /62   Pulse 70   Temp 97.9 °F (36.6 °C) (Oral)   Resp 16   Ht 5' 7\" (1.702 m)   Wt 267 lb 13.7 oz (121.5 kg)   SpO2 97%   BMI 41.95 kg/m²   SPO2 (COPD values may differ): Greater than or equal to 92% on room air = 0    Peak Flow (asthma only): not applicable = 0    RSI: 0-4 = See once and convert to home regimen or discontinue        Plan       Goals: medication delivery, mobilize retained secretions, volume expansion and improve oxygenation    Patient/caregiver was educated on the proper method of use for Respiratory Care Devices:  Yes      Level of patient/caregiver understanding able to:   [x] Verbalize understanding   [x] Demonstrate understanding       [] Teach back        [] Needs reinforcement       []  No available caregiver               []  Other:     Response to education:  Very Good     Is patient being placed on Home Treatment Regimen? Yes     Does the patient have everything they need prior to discharge? NA     Comments: Pt assessed and meds reviewed, continue as ordered. Plan of Care:  Albuterol MDI PRN    Electronically signed by Nai Morris RCP on 8/17/2018 at 8:37 PM    Respiratory Protocol Guidelines

## 2018-08-18 NOTE — PROGRESS NOTES
Went over discharge and medication instructions with pt and family. Removed pt IV, pt getting dressed. Pt being released home with his wife and daughter.   Marcliff Cough

## 2018-08-18 NOTE — PLAN OF CARE
Problem: Falls - Risk of:  Goal: Will remain free from falls  Will remain free from falls   Outcome: Ongoing  Bed alarm activated. Call light in reach. Bed in lowest position.

## 2018-08-20 ENCOUNTER — OFFICE VISIT (OUTPATIENT)
Dept: ORTHOPEDIC SURGERY | Age: 83
End: 2018-08-20

## 2018-08-20 VITALS
WEIGHT: 238 LBS | HEIGHT: 68 IN | HEART RATE: 103 BPM | BODY MASS INDEX: 36.07 KG/M2 | SYSTOLIC BLOOD PRESSURE: 118 MMHG | DIASTOLIC BLOOD PRESSURE: 63 MMHG

## 2018-08-20 DIAGNOSIS — G89.4 CHRONIC PAIN SYNDROME: Primary | ICD-10-CM

## 2018-08-20 DIAGNOSIS — M48.062 LUMBAR STENOSIS WITH NEUROGENIC CLAUDICATION: ICD-10-CM

## 2018-08-20 DIAGNOSIS — M48.02 CERVICAL STENOSIS OF SPINAL CANAL: ICD-10-CM

## 2018-08-20 DIAGNOSIS — M54.12 CERVICAL RADICULITIS: ICD-10-CM

## 2018-08-20 PROCEDURE — 1123F ACP DISCUSS/DSCN MKR DOCD: CPT | Performed by: PHYSICIAN ASSISTANT

## 2018-08-20 PROCEDURE — 4040F PNEUMOC VAC/ADMIN/RCVD: CPT | Performed by: PHYSICIAN ASSISTANT

## 2018-08-20 PROCEDURE — 1036F TOBACCO NON-USER: CPT | Performed by: PHYSICIAN ASSISTANT

## 2018-08-20 PROCEDURE — G8427 DOCREV CUR MEDS BY ELIG CLIN: HCPCS | Performed by: PHYSICIAN ASSISTANT

## 2018-08-20 PROCEDURE — 1111F DSCHRG MED/CURRENT MED MERGE: CPT | Performed by: PHYSICIAN ASSISTANT

## 2018-08-20 PROCEDURE — G8417 CALC BMI ABV UP PARAM F/U: HCPCS | Performed by: PHYSICIAN ASSISTANT

## 2018-08-20 PROCEDURE — 99214 OFFICE O/P EST MOD 30 MIN: CPT | Performed by: PHYSICIAN ASSISTANT

## 2018-08-20 PROCEDURE — 1101F PT FALLS ASSESS-DOCD LE1/YR: CPT | Performed by: PHYSICIAN ASSISTANT

## 2018-08-20 RX ORDER — HYDROCODONE BITARTRATE AND ACETAMINOPHEN 5; 325 MG/1; MG/1
1 TABLET ORAL 3 TIMES DAILY PRN
Qty: 21 TABLET | Refills: 0 | Status: SHIPPED | OUTPATIENT
Start: 2018-08-20 | End: 2018-08-27

## 2018-08-20 NOTE — PROGRESS NOTES
Follow up: SPINE    CHIEF COMPLAINT:    Chief Complaint   Patient presents with    Back Pain     Pain goes down both legs. HISTORY OF PRESENT ILLNESS:                The patient is a [de-identified] y.o. male s/p remote lumbar decompression x2 (Dr. Fortino Hughes then subsequently Dr. Tamara Fields 2017) known underlying severe cervical and lumbar central stenosis, referred back by Dr. Fortino Hughes for chronic neck and back pain. He reports chronic aching neck/scapular and low back pain extending into the bilateral lower extremities predominantly the anterolateral thighs. Symptoms typically increased with prolonged walking or standing. Relief with sitting and resting. Previous B L4 5 TX SKY 6/12/2018 provided very temporary relief. He currently denies any progressive extremity weakness, no recent bowel or bladder changes. He does report good improvement with Norco 5/325 BID PRN--sparingly (last filled June) without side effects. He feels that this medication allows him to walk and stand longer distances. He was recently hospitalized for near syncope and found to have SERA--his Lyrica dosing was decreased at that visit to 75mg BID. He denies any recent trauma. At times he does report some gait instability, ambulate with a cane. States that his previous hand weakness is improved at this time. Did not see Dr. Codie Keith as previously recommended.     Pain with medication 2/10    Pain without medication 7/10    Side effects: Denies       Past/Current Treatment:   PT: YES, cont HEP  Chiro: no  MEDS:      Current: Lyrica 75mg I po BID--decreased from 100 TID, Norco 5/325 I po BID PRN very sparingly      Past: Prior oral steroids, gabapentin without improvement, tramadol, diclofenac gel   Injection:  9/13/16: Ming L4-5 TX SKY  11/1/16: Ming L3-4 TX SKY  12/13/16: Ming L2-3 TX SKY  1/29/18 Midline C7/T1 DANIA  2/26/18 Midline C7/T1 DANIA---80% improved   6/12/18 Bilateral L4-5 TX SKY #3--50% Improved--short term  Sx: Recent sx consult Dr. Fortino Hughes: no further sx at this time, S/p lumbar decompression Dr. Christal Ramirez. Recent sx consult w/Dr. Haydee Ramirez rec: conservative care at this time his note has been reviewed     Past Medical History: Medical history form was reviewed and scanned into the Media tab  Past Medical History:   Diagnosis Date    Arthritis     Asthma     BPH     CAD (coronary artery disease)     Chest pain     Hearing decreased     HEARING AIDS    Hyperlipidemia     Hypertension     Need for post exposure prophylaxis for rabies 1980    Thyroid disease     hypothyroidism    Wears glasses         REVIEW OF SYSTEMS:   CONSTITUTIONAL: Denies unexplained weight loss, fevers, chills or fatigue  NEUROLOGIC: Denies tremors or seizures         PHYSICAL EXAM:    Vitals: Blood pressure 118/63, pulse 103, height 5' 8.39\" (1.737 m), weight 238 lb (108 kg). GENERAL EXAM:  · General Apparence: Patient is adequately groomed with no evidence of malnutrition. · Orientation: The patient is oriented to time, place and person. · Mood & Affect:The patient's mood and affect are appropriate   · Lymphatic: The lymphatic examination bilaterally reveals all areas to be without enlargement or induration  · Sensation: Sensation is intact without deficit  CERVICAL EXAMINATION:  · Inspection: Local inspection shows no step-off or bruising. · Palpation: No evidence of tenderness at the midline, and trapezius. Paraspinal tenderness is present, traps  · Range of Motion: Intact flexion mild to moderate loss of extension  · Strength: 5/5 bilateral upper extremities exception of bilateral finger abduction & right biceps 5-/5--improved from prior   · Special Tests:    ·              Spurling's, L'Hermitte's & Goetz's negative bilaterally. ·             Cubital tunnel Tinel's + bilaterally. · Skin:There are no rashes, ulcerations or lesions in right & left upper extremities. · Reflexes: Bilaterally triceps, biceps and brachioradialis are 1-2+.   Clonus

## 2018-08-21 LAB
EKG ATRIAL RATE: 88 BPM
EKG DIAGNOSIS: NORMAL
EKG P AXIS: 65 DEGREES
EKG P-R INTERVAL: 204 MS
EKG Q-T INTERVAL: 348 MS
EKG QRS DURATION: 74 MS
EKG QTC CALCULATION (BAZETT): 421 MS
EKG R AXIS: 2 DEGREES
EKG T AXIS: 36 DEGREES
EKG VENTRICULAR RATE: 88 BPM

## 2018-08-22 ENCOUNTER — HOSPITAL ENCOUNTER (OUTPATIENT)
Dept: PHYSICAL THERAPY | Age: 83
Discharge: HOME | End: 2018-08-22
Payer: MEDICARE

## 2018-08-29 ENCOUNTER — HOSPITAL ENCOUNTER (OUTPATIENT)
Dept: PHYSICAL THERAPY | Age: 83
Setting detail: THERAPIES SERIES
Discharge: HOME | End: 2018-08-29
Payer: MEDICARE

## 2018-08-29 PROCEDURE — 97110 THERAPEUTIC EXERCISES: CPT | Performed by: PHYSICAL THERAPIST

## 2018-08-29 PROCEDURE — G8979 MOBILITY GOAL STATUS: HCPCS | Performed by: PHYSICAL THERAPIST

## 2018-08-29 PROCEDURE — G8978 MOBILITY CURRENT STATUS: HCPCS | Performed by: PHYSICAL THERAPIST

## 2018-08-29 PROCEDURE — 97162 PT EVAL MOD COMPLEX 30 MIN: CPT | Performed by: PHYSICAL THERAPIST

## 2018-08-29 NOTE — FLOWSHEET NOTE
[facility], [address] [place]  Phone: [phone]  Fax [phone]    Physical Therapy Daily Treatment Note  Date:  2018    Patient Name:  Melia Kussmaul    :  1935  MRN: [id number]  Restrictions/Precautions:    Medical/Treatment Diagnosis Information:  Diagnosis: gait and balance R26.0  Treatment Diagnosis: gait and balance K89.6   Insurance/Certification information:  PT Insurance Information: medicare   Physician Information:  Referring Practitioner: Dr. Amena Molina of care signed (Y/N): sent; POC expires 10/24/18     Date of Patient follow up with Physician: pt to f/u with cardiologist     G-Code (if applicable):      Date G-Code Applied:  18 LEFS 66%        Progress Note: [x]  Yes  []  No  Next due by: Visit #10       Latex Allergy:  [x]NO      []YES  Preferred Language for Healthcare:   [x]English       []other:    Visit # Insurance Allowable Requires auth   1 BMN (prev PT 14 visits)    []no        []yes:       Pain level:  6/10     SUBJECTIVE:  See eval    OBJECTIVE: See eval  Observation:   Test measurements:  -140 BPM; BP 87/56   Limited exercise today secondary to pt's low BP and high HR. Pt to f/u with cardiologist for results of cardiac tests before returning to PT     RESTRICTIONS/PRECAUTIONS: B THR, B TKR, stenosis cervical and lumbar, recent falls, recent hospitalizations   MEDICARE CAP EXCEPTION DOCUMENTATION      I certify that this patient meets one of the below criteria necessary for becoming an exception to the Medicare cap on therapy services:    []  The patient has a condition identified by an ICD-9 code that has a direct and significant impact on the need for therapy. (Significantly impacts the rate of recovery.)                   []  The patient has a complexity identified by an ICD-9 code that has a direct and significant impact on the need for therapy.   (Significantly Deficits. Long Term Goals: To be achieved in: 6-8 weeks  1. Disability index score of 59% or less for the LEFS to assist with reaching prior level of function. 2. Patient will demonstrate increased AROM to BLE's to WNL's to allow for proper joint functioning as indicated by patients Functional Deficits. 3. Patient will demonstrate an increase in Strength to good proximal hip strength and control, within 5lb HHD in LE to allow for proper functional mobility as indicated by patients Functional Deficits. 4. Patient will return to being able to ambulate with AAD, ascend and descend stairs  without increased symptoms or restriction. Progression Towards Functional goals:  [] Patient is progressing as expected towards functional goals listed. [] Progression is slowed due to complexities listed. [] Progression has been slowed due to co-morbidities.   [x] Plan just implemented, too soon to assess goals progression  [] Other:     ASSESSMENT:  See eval    Treatment/Activity Tolerance:  [x] Patient tolerated treatment well [x] Patient limited by fatique  [] Patient limited by pain  [x] Patient limited by other medical complications  [] Other:     Prognosis: [] Good [x] Fair  [] Poor    Patient Requires Follow-up: [x] Yes  [] No    PLAN: See eval ; hold PT pending results of echo and MD visit   [] Continue per plan of care [] Alter current plan (see comments)  [x] Plan of care initiated [x] Hold pending MD visit [] Discharge    Electronically signed by: Andrew Lambert, [id number]

## 2018-08-29 NOTE — PLAN OF CARE
[facility], [facility]  [address] [place] [address], [address] [place] [address]  Phone: [phone]  Fax [phone]     Physical Therapy Certification    Dear Referring Practitioner: Dr. Brayan Cabrera ,    We had the pleasure of evaluating the following patient for physical therapy services at [address] [place] [address]. A summary of our findings can be found in the initial assessment below. This includes our plan of care. If you have any questions or concerns regarding these findings, please do not hesitate to contact me at the office phone number checked above. Thank you for the referral.       Physician Signature:_______________________________Date:__________________  By signing above (or electronic signature), therapists plan is approved by physician    Patient: Siddhartha Caballero   : 1935   MRN: [id number]  Referring Physician: Referring Practitioner: Dr. Brayan Cabrera       Evaluation Date: 2018      Medical Diagnosis Information:  Diagnosis: gait and balance R26.0   Treatment Diagnosis: gait and balance R26.0                                          Insurance information: PT Insurance Information: medicare      Precautions/ Contra-indications: HTN, B TKR, B THR, hx of falls, chronic neck and LBP (stenosis)   Latex Allergy:  [x]NO      []YES  Preferred Language for Healthcare:   [x]English       []other:    SUBJECTIVE: Patient reports he was recently hospitalized 2x secondary to BP and possible CVA but pt did not have CVA. Pt coming to PT to improve balance and improve walking. Pt reports he has had multiple falls but none too severe but he cannot remember the date of his last fall . Pt reports he goes to cardiac rehab 3x week but does not do any of his previous PT exercises. He is seeing Dr. Courtney Pimentel for care of his neck and LB. Pt reports he just had an echo cardiogram and blood work and is waiting for results.   Pt reports he was d/c'd from the hospital on 8/17/18       Relevant Medical History: B TKR, B THR, HTN ; hx of LB surgeries   Functional Disability Index:     Pain Scale: 6/10 LB   Easing factors:  Sitting   Provocative factors: walking      Type: [x]Constant   []Intermittent  []Radiating []Localized []other:     Numbness/Tingling: B LE's     Occupation/School: retired     Living Status/Prior Level of Function: Independent with ADLs and IADLs, lives with wife; 2 story home ; pt has bedroom and bathroom on 1st floor; pt was able to walk for exercise at park (1 mile) approx 1 yr ago     OBJECTIVE:     ROM LEFT RIGHT   HIP Flex     HIP Abd     HIP Ext     HIP IR     HIP ER     Knee ext     Knee Flex     Ankle PF     Ankle DF     Ankle In     Ankle Ev     Strength  LEFT RIGHT   HIP Flexors 3+ 4-   HIP Abductors 4- 4   HIP Ext     Hip ER     Knee EXT (quad) 4 4   Knee Flex (HS) 4+ 4+   Ankle DF 4- 4-   Ankle PF     Ankle Inv     Ankle EV          Circumference  Mid apex  7 cm prox             Reflexes/Sensation: NT    []Dermatomes/Myotomes intact    [x]Reflexes equal and normal bilaterally   []Other:    Joint mobility: NT    []Normal    []Hypo   []Hyper    Palpation: NT     Functional Mobility/Transfers: independent; difficulty with rolling supine to SL B     Posture: slight fwd flexion, fwd head posture ; R hip ER     Bandages/Dressings/Incisions: NA     Gait[de-identified] FWBing without SC, WBOS, B trendelenberg L>R, R hip ER     Orthopedic Special Tests: Fowler balance test   /56                       [x] Patient history, allergies, meds reviewed. Medical chart reviewed. See intake form. Review Of Systems (ROS):  [x]Performed Review of systems (Integumentary, CardioPulmonary, Neurological) by intake and observation. Intake form has been scanned into medical record. Patient has been instructed to contact their primary care physician regarding ROS issues if not already being addressed at this time.       Co-morbidities/Complexities (which will affect course of rehabilitation):   []None           Arthritic conditions   []Rheumatoid arthritis (M05.9)  [x]Osteoarthritis (M19.91)   Cardiovascular conditions   [x]Hypertension (I10)  []Hyperlipidemia (E78.5)  []Angina pectoris (I20)  []Atherosclerosis (I70)   Musculoskeletal conditions   [x]Disc pathology   []Congenital spine pathologies   [x]Prior surgical intervention  []Osteoporosis (M81.8)  []Osteopenia (M85.8)   Endocrine conditions   []Hypothyroid (E03.9)  []Hyperthyroid Gastrointestinal conditions   []Constipation (W75.91)   Metabolic conditions   [x]Morbid obesity (E66.01)  []Diabetes type 1(E10.65) or 2 (E11.65)   []Neuropathy (G60.9)     Pulmonary conditions   [x]Asthma (J45)  []Coughing   []COPD (J44.9)   Psychological Disorders  []Anxiety (F41.9)  []Depression (F32.9)   []Other:   [x]Other:  B TKR, B THR         Barriers to/and or personal factors that will affect rehab potential:              [x]Age  []Sex              []Motivation/Lack of Motivation                        [x]Co-Morbidities              []Cognitive Function, education/learning barriers              []Environmental, home barriers              []profession/work barriers  [x]past PT/medical experience  []other:  Justification: pt has chronic neck and LB pain, pt has multiple episodes of PT previously with no significant improvement; pt recently hospitalized x2 , pt cont to have low BP and high HR      Falls Risk Assessment (30 days):   [] Falls Risk assessed and no intervention required.   [x] Falls Risk assessed and Patient requires intervention due to being higher risk   TUG score (>12s at risk):   Pt scored 12 sec on TUG test with SC   [x] Falls education provided, including pt edu to cont to use sc for balance and secondary to SOB and low BP        G-Codes:       ASSESSMENT:   Functional Impairments:     []Noted lumbar/proximal hip/LE joint hypomobility   []Decreased LE functional ROM   [x]Decreased core/proximal hip strength and neuromuscular Justification  [x] A history of present problem with:  [] no personal factors and/or comorbidities that impact the plan of care;  []1-2 personal factors and/or comorbidities that impact the plan of care  [x]3 personal factors and/or comorbidities that impact the plan of care  [x] An examination of body systems using standardized tests and measures addressing any of the following: body structures and functions (impairments), activity limitations, and/or participation restrictions;:  [] a total of 1-2 or more elements   [x] a total of 3 or more elements   [] a total of 4 or more elements   [x] A clinical presentation with:  [] stable and/or uncomplicated characteristics   [x] evolving clinical presentation with changing characteristics  [] unstable and unpredictable characteristics;   [x] Clinical decision making of [] low, [x] moderate, [] high complexity using standardized patient assessment instrument and/or measurable assessment of functional outcome. [] EVAL (LOW) 87899 (typically 20 minutes face-to-face)  [x] EVAL (MOD) 54225 (typically 30 minutes face-to-face)  [] EVAL (HIGH) 64700 (typically 45 minutes face-to-face)  [] RE-EVAL       PLAN:   Frequency/Duration:  1-2 days per week for 6-8 Weeks:  Interventions:  [x]  Therapeutic exercise including: strength training, ROM, for Lower extremity and core   [x]  NMR activation and proprioception for LE, Glutes and Core   []  Manual therapy as indicated for LE, Hip and spine to include: Dry Needling/IASTM, STM, PROM, Gr I-IV mobilizations, manipulation. [] Modalities as needed that may include: thermal agents, E-stim, Biofeedback, US, iontophoresis as indicated  [x] Patient education on joint protection, postural re-education, activity modification, progression of HEP. HEP instruction: pt given written HEP     GOALS:  Patient stated goal:  Walk better and improve balance, be able to walk without cane     Therapist goals for Patient:   Short Term Goals:  To be achieved in: 2 weeks  1. Independent in HEP and progression per patient tolerance, in order to prevent re-injury. 2. Patient will have a decrease in pain to facilitate improvement in movement, function, and ADLs as indicated by Functional Deficits. Long Term Goals: To be achieved in: 6-8 weeks  1. Disability index score of 59% or less for the LEFS to assist with reaching prior level of function. 2. Patient will demonstrate increased AROM to BLE's to WNL's to allow for proper joint functioning as indicated by patients Functional Deficits. 3. Patient will demonstrate an increase in Strength to good proximal hip strength and control, within 5lb HHD in LE to allow for proper functional mobility as indicated by patients Functional Deficits. 4. Patient will return to being able to ambulate with AAD, ascend and descend stairs  without increased symptoms or restriction.          Electronically signed by:  Fredy Tim PT

## 2018-09-04 ENCOUNTER — OFFICE VISIT (OUTPATIENT)
Dept: NEUROLOGY | Age: 83
End: 2018-09-04

## 2018-09-04 VITALS
SYSTOLIC BLOOD PRESSURE: 113 MMHG | OXYGEN SATURATION: 92 % | HEART RATE: 85 BPM | WEIGHT: 257 LBS | BODY MASS INDEX: 38.95 KG/M2 | DIASTOLIC BLOOD PRESSURE: 70 MMHG | HEIGHT: 68 IN

## 2018-09-04 DIAGNOSIS — F51.01 PRIMARY INSOMNIA: ICD-10-CM

## 2018-09-04 DIAGNOSIS — G47.33 OSA (OBSTRUCTIVE SLEEP APNEA): Primary | ICD-10-CM

## 2018-09-04 DIAGNOSIS — E03.9 ACQUIRED HYPOTHYROIDISM: ICD-10-CM

## 2018-09-04 DIAGNOSIS — G93.40 ACUTE ENCEPHALOPATHY: ICD-10-CM

## 2018-09-04 DIAGNOSIS — I10 ESSENTIAL HYPERTENSION: ICD-10-CM

## 2018-09-04 PROCEDURE — 1101F PT FALLS ASSESS-DOCD LE1/YR: CPT | Performed by: PSYCHIATRY & NEUROLOGY

## 2018-09-04 PROCEDURE — G8427 DOCREV CUR MEDS BY ELIG CLIN: HCPCS | Performed by: PSYCHIATRY & NEUROLOGY

## 2018-09-04 PROCEDURE — 1111F DSCHRG MED/CURRENT MED MERGE: CPT | Performed by: PSYCHIATRY & NEUROLOGY

## 2018-09-04 PROCEDURE — 4040F PNEUMOC VAC/ADMIN/RCVD: CPT | Performed by: PSYCHIATRY & NEUROLOGY

## 2018-09-04 PROCEDURE — G8417 CALC BMI ABV UP PARAM F/U: HCPCS | Performed by: PSYCHIATRY & NEUROLOGY

## 2018-09-04 PROCEDURE — 1036F TOBACCO NON-USER: CPT | Performed by: PSYCHIATRY & NEUROLOGY

## 2018-09-04 PROCEDURE — 99214 OFFICE O/P EST MOD 30 MIN: CPT | Performed by: PSYCHIATRY & NEUROLOGY

## 2018-09-04 PROCEDURE — 1123F ACP DISCUSS/DSCN MKR DOCD: CPT | Performed by: PSYCHIATRY & NEUROLOGY

## 2018-09-04 RX ORDER — TORSEMIDE 10 MG/1
TABLET ORAL
Refills: 3 | COMMUNITY
Start: 2018-06-11

## 2018-09-04 RX ORDER — CHOLECALCIFEROL (VITAMIN D3) 125 MCG
5 CAPSULE ORAL DAILY
COMMUNITY

## 2018-09-04 NOTE — PROGRESS NOTES
The patient came today for follow up regarding: hospital follow up, recent encephalopathy and possible DARON. The patient was seen last month at [facility] for acute encephalopathy. He was dx with metabolic encephalopathy and polypharmacy. He was DC home after being medically stable. Last MRI brain from last month was wnl. He decreased some of his home medications including Lyrica. Wife witnessed snoring and apnea at night. Degree is severe and can be daily. Onset could a year ago. He has chronic insomnia. He wakes up few times at night to void. He feels tired during day time with EDS. He can doze off easily  He takes napes in the afternoon  Hx of parasomnia with sleep talking and walking. Had sleep eating parasomnia which is better after he stopped Ambien.   + FH Of DARON  He is overweight. Weight is stable  No severe depression but + memory loss. No recent falling or LOC  PMH + for HTN, CAD, HLD, CKD  He takes Melatonin PRN for insomnia. Other ROS was negative. Past Medical History:   Diagnosis Date    Arthritis     Asthma     BPH     CAD (coronary artery disease)     Chest pain     Hearing decreased     HEARING AIDS    Hyperlipidemia     Hypertension     Need for post exposure prophylaxis for rabies 1980    Thyroid disease     hypothyroidism    Wears glasses      Prior to Visit Medications    Medication Sig Taking? Authorizing Provider   melatonin 5 MG TABS tablet Take 5 mg by mouth daily Yes Historical Provider, MD   torsemide (DEMADEX) 10 MG tablet TAKE 1 TAB BY MOUTH DAILY. Yes Historical Provider, MD   metoprolol succinate (TOPROL XL) 25 MG extended release tablet Take 1 tablet by mouth daily Yes Ailyn Hatch MD   pregabalin (LYRICA) 75 MG capsule Take 1 capsule by mouth 2 times daily for 30 days. Cee Alonso MD   VOLTAREN 1 % GEL APPLY 4 GRAMS TOPICALLY FOUR TIMES DAILY Yes SEN Webb MD   peppermint oil liquid Take 1 capsule by mouth as needed (FOR personally reviewed and updated social history, past medical history, medications, allergy, surgical history, and family history as documented in the patient's electronic health records. Labs and/or neuroimaging and other test results reviewed and discussed with the patient. Reviewed notes from other physicians. Provided patient education regarding risk, benefits and treatment options as well as adherence to medication regimen and side effect from these medications. Diagnosis Orders   1. DARON (obstructive sleep apnea)  Baseline Diagnostic Sleep Study   2. Essential hypertension     3. Primary insomnia     4. Acute encephalopathy     5. Acquired hypothyroidism           Assessment:  Rule out sleep related breathing disorder  Hypertension  Insomnia  Acute recent metabolic encephalopathy, improving. Likely polypharmacy and multifactorial  Hypothyroid  Chronic cognitive impairment  Obesity      Plan:  Schedule over night PSG  Long discussion with the patient and his wife regarding sleep apnea, complication and possible treatment.   We'll schedule CPAP after his PSG  Weight reduction programs  Improving sleep hygiene  Avoid Ambien  Can use melatonin as needed  Continue taper Lyrica down and avoid polypharmacy  Continue home blood pressure medications and monitor blood pressure  Secondary stroke prevention  Aspirin  Statin  Synthroid and follow TFT    RTC two months

## 2018-09-06 ENCOUNTER — HOSPITAL ENCOUNTER (OUTPATIENT)
Age: 83
Discharge: HOME | End: 2018-09-06
Payer: MEDICARE

## 2018-09-06 PROCEDURE — 9900000038 HC CARDIAC REHAB PHASE 3 - MONTHLY

## 2018-10-08 ENCOUNTER — HOSPITAL ENCOUNTER (OUTPATIENT)
Dept: SLEEP CENTER | Age: 83
Discharge: HOME | End: 2018-10-10
Payer: MEDICARE

## 2018-10-08 PROCEDURE — 95810 POLYSOM 6/> YRS 4/> PARAM: CPT

## 2018-10-08 PROCEDURE — 95810 POLYSOM 6/> YRS 4/> PARAM: CPT | Performed by: PSYCHIATRY & NEUROLOGY

## 2018-10-09 ENCOUNTER — HOSPITAL ENCOUNTER (OUTPATIENT)
Age: 83
Discharge: HOME | End: 2018-10-09
Payer: MEDICARE

## 2018-10-09 PROCEDURE — 9900000038 HC CARDIAC REHAB PHASE 3 - MONTHLY

## 2018-10-10 ENCOUNTER — OFFICE VISIT (OUTPATIENT)
Dept: ORTHOPEDIC SURGERY | Age: 83
End: 2018-10-10
Payer: MEDICARE

## 2018-10-10 VITALS
BODY MASS INDEX: 38.96 KG/M2 | DIASTOLIC BLOOD PRESSURE: 76 MMHG | SYSTOLIC BLOOD PRESSURE: 154 MMHG | WEIGHT: 257.06 LBS | HEART RATE: 82 BPM | HEIGHT: 68 IN

## 2018-10-10 DIAGNOSIS — M48.02 CERVICAL STENOSIS OF SPINAL CANAL: Primary | ICD-10-CM

## 2018-10-10 DIAGNOSIS — M54.12 CERVICAL RADICULITIS: ICD-10-CM

## 2018-10-10 DIAGNOSIS — M48.062 LUMBAR STENOSIS WITH NEUROGENIC CLAUDICATION: ICD-10-CM

## 2018-10-10 PROCEDURE — G8417 CALC BMI ABV UP PARAM F/U: HCPCS | Performed by: PHYSICIAN ASSISTANT

## 2018-10-10 PROCEDURE — 1036F TOBACCO NON-USER: CPT | Performed by: PHYSICIAN ASSISTANT

## 2018-10-10 PROCEDURE — 99214 OFFICE O/P EST MOD 30 MIN: CPT | Performed by: PHYSICIAN ASSISTANT

## 2018-10-10 PROCEDURE — 1123F ACP DISCUSS/DSCN MKR DOCD: CPT | Performed by: PHYSICIAN ASSISTANT

## 2018-10-10 PROCEDURE — G8484 FLU IMMUNIZE NO ADMIN: HCPCS | Performed by: PHYSICIAN ASSISTANT

## 2018-10-10 PROCEDURE — 4040F PNEUMOC VAC/ADMIN/RCVD: CPT | Performed by: PHYSICIAN ASSISTANT

## 2018-10-10 PROCEDURE — 1101F PT FALLS ASSESS-DOCD LE1/YR: CPT | Performed by: PHYSICIAN ASSISTANT

## 2018-10-10 PROCEDURE — G8427 DOCREV CUR MEDS BY ELIG CLIN: HCPCS | Performed by: PHYSICIAN ASSISTANT

## 2018-10-10 NOTE — PROGRESS NOTES
Hospital:INDICATION: Neck pain, bilateral upper extremity radicular pain    COMPARISON: Radiograph dated 3/31/2014    TECHNIQUE: MRI of the cervical spine was performed without  contrast according to standard protocol. FINDINGS:    C3-4: There is diffuse disc bulge with superimposed left subarticular  disc protrusion abutting the cord and resulting in moderate to severe  spinal canal stenosis. There is moderate right and mild left neural  foraminal stenosis. C4-5: There is diffuse disc bulge resulting in severe spinal canal  stenosis. There is moderate to severe bilateral neural foraminal  stenosis. C5-6: There is diffuse disc bulge resulting in severe spinal canal  stenosis. There is moderate to severe bilateral neural foraminal  stenosis. C6-7: There is a right paracentral disc protrusion resulting in moderate  spinal canal stenosis. There is moderate right and mild left neural  foraminal stenosis. C7-T1: There is no disc bulge. There is no spinal canal stenosis. There  is no neural foraminal stenosis.          Impression:  1) Acute/chronic neck pain, bilateral cervical radiculitis L>R with with mild myelopathy  2) Severe central stenosis C5 6, C4 5  4) Chronic LBP, bilateral radiculitis w/ neurogenic claudication, severe central stenosis L4 5 s/p repeat decompression  w/Dr. Juan C Rojas w/persistent stenosis  5) Prior lumbar sx consult-Dr. Conner Vigil: no further lumbar sx         Plan:  1) It appears that his neck and cervical radiating symptoms have worsened over the last month he also describes gait instability and falls. I recommend an updated cervical MRI scan and follow up with Dr. Conner Vigil for his cervical spine. 2) TENs unit  3) If no cervical surgery is indicated we discussed referral to Dr. Shayy Fragoso. We also discussed he may be a candidate for spinal cord stimulator for his lower extremity radiating symptoms.             [facility]

## 2018-10-12 ENCOUNTER — HOSPITAL ENCOUNTER (OUTPATIENT)
Dept: MRI IMAGING | Age: 83
Discharge: HOME | End: 2018-10-12
Payer: MEDICARE

## 2018-10-12 DIAGNOSIS — M54.12 CERVICAL RADICULITIS: ICD-10-CM

## 2018-10-12 DIAGNOSIS — M48.062 LUMBAR STENOSIS WITH NEUROGENIC CLAUDICATION: ICD-10-CM

## 2018-10-12 DIAGNOSIS — M48.02 CERVICAL STENOSIS OF SPINAL CANAL: ICD-10-CM

## 2018-10-12 PROCEDURE — 72141 MRI NECK SPINE W/O DYE: CPT

## 2018-10-30 ENCOUNTER — TELEPHONE (OUTPATIENT)
Dept: NEUROLOGY | Age: 83
End: 2018-10-30

## 2018-10-30 DIAGNOSIS — G47.33 OSA (OBSTRUCTIVE SLEEP APNEA): Primary | ICD-10-CM

## 2018-11-07 ENCOUNTER — HOSPITAL ENCOUNTER (OUTPATIENT)
Age: 83
Discharge: HOME | End: 2018-11-07
Payer: MEDICARE

## 2018-11-07 PROBLEM — M51.379 DEGENERATION OF LUMBAR OR LUMBOSACRAL INTERVERTEBRAL DISC: Status: ACTIVE | Noted: 2018-11-07

## 2018-11-07 PROBLEM — M51.379 DEGENERATION OF LUMBAR OR LUMBOSACRAL INTERVERTEBRAL DISC: Chronic | Status: ACTIVE | Noted: 2018-11-07

## 2018-11-07 PROBLEM — M51.37 DEGENERATION OF LUMBAR OR LUMBOSACRAL INTERVERTEBRAL DISC: Chronic | Status: ACTIVE | Noted: 2018-11-07

## 2018-11-07 PROBLEM — M48.061 SPINAL STENOSIS, LUMBAR REGION, WITHOUT NEUROGENIC CLAUDICATION: Chronic | Status: ACTIVE | Noted: 2018-11-07

## 2018-11-07 PROBLEM — M48.061 SPINAL STENOSIS, LUMBAR REGION, WITHOUT NEUROGENIC CLAUDICATION: Status: ACTIVE | Noted: 2018-11-07

## 2018-11-07 PROBLEM — M47.817 LUMBOSACRAL SPONDYLOSIS WITHOUT MYELOPATHY: Status: ACTIVE | Noted: 2018-11-07

## 2018-11-07 PROBLEM — M51.26 DISPLACEMENT OF LUMBAR INTERVERTEBRAL DISC WITHOUT MYELOPATHY: Status: ACTIVE | Noted: 2018-11-07

## 2018-11-07 PROBLEM — M51.37 DEGENERATION OF LUMBAR OR LUMBOSACRAL INTERVERTEBRAL DISC: Status: ACTIVE | Noted: 2018-11-07

## 2018-11-07 PROBLEM — M47.817 LUMBOSACRAL SPONDYLOSIS WITHOUT MYELOPATHY: Chronic | Status: ACTIVE | Noted: 2018-11-07

## 2018-11-07 PROBLEM — M51.26 DISPLACEMENT OF LUMBAR INTERVERTEBRAL DISC WITHOUT MYELOPATHY: Chronic | Status: ACTIVE | Noted: 2018-11-07

## 2018-11-07 PROCEDURE — 9900000038 HC CARDIAC REHAB PHASE 3 - MONTHLY

## 2018-11-13 ENCOUNTER — OFFICE VISIT (OUTPATIENT)
Dept: ORTHOPEDIC SURGERY | Age: 83
End: 2018-11-13
Payer: MEDICARE

## 2018-11-13 VITALS
WEIGHT: 257.94 LBS | SYSTOLIC BLOOD PRESSURE: 147 MMHG | HEIGHT: 67 IN | BODY MASS INDEX: 40.48 KG/M2 | DIASTOLIC BLOOD PRESSURE: 68 MMHG | HEART RATE: 77 BPM

## 2018-11-13 DIAGNOSIS — M47.12 CERVICAL SPONDYLOSIS WITH MYELOPATHY: Primary | ICD-10-CM

## 2018-11-13 PROCEDURE — G8484 FLU IMMUNIZE NO ADMIN: HCPCS | Performed by: ORTHOPAEDIC SURGERY

## 2018-11-13 PROCEDURE — 1101F PT FALLS ASSESS-DOCD LE1/YR: CPT | Performed by: ORTHOPAEDIC SURGERY

## 2018-11-13 PROCEDURE — 1123F ACP DISCUSS/DSCN MKR DOCD: CPT | Performed by: ORTHOPAEDIC SURGERY

## 2018-11-13 PROCEDURE — 4040F PNEUMOC VAC/ADMIN/RCVD: CPT | Performed by: ORTHOPAEDIC SURGERY

## 2018-11-13 PROCEDURE — G8417 CALC BMI ABV UP PARAM F/U: HCPCS | Performed by: ORTHOPAEDIC SURGERY

## 2018-11-13 PROCEDURE — 1036F TOBACCO NON-USER: CPT | Performed by: ORTHOPAEDIC SURGERY

## 2018-11-13 PROCEDURE — G8427 DOCREV CUR MEDS BY ELIG CLIN: HCPCS | Performed by: ORTHOPAEDIC SURGERY

## 2018-11-13 PROCEDURE — 99213 OFFICE O/P EST LOW 20 MIN: CPT | Performed by: ORTHOPAEDIC SURGERY

## 2018-11-21 ENCOUNTER — HOSPITAL ENCOUNTER (OUTPATIENT)
Dept: PHYSICAL THERAPY | Age: 83
Discharge: HOME | End: 2018-11-21
Payer: MEDICARE

## 2018-11-21 PROCEDURE — G0283 ELEC STIM OTHER THAN WOUND: HCPCS | Performed by: PHYSICAL THERAPIST

## 2018-11-21 PROCEDURE — 97110 THERAPEUTIC EXERCISES: CPT | Performed by: PHYSICAL THERAPIST

## 2018-11-21 PROCEDURE — 97162 PT EVAL MOD COMPLEX 30 MIN: CPT | Performed by: PHYSICAL THERAPIST

## 2018-11-21 PROCEDURE — G8981 BODY POS CURRENT STATUS: HCPCS | Performed by: PHYSICAL THERAPIST

## 2018-11-21 PROCEDURE — 97140 MANUAL THERAPY 1/> REGIONS: CPT | Performed by: PHYSICAL THERAPIST

## 2018-11-21 PROCEDURE — G8982 BODY POS GOAL STATUS: HCPCS | Performed by: PHYSICAL THERAPIST

## 2018-11-21 NOTE — PLAN OF CARE
[facility], [address] [facility]  [address] [place] [address]  Phone: [phone]  Fax [phone]   Physical Therapy Certification    Dear Referring Practitioner: Dr. Melva Briones,    We had the pleasure of evaluating the following patient for physical therapy services at [address] [place] [address]. A summary of our findings can be found in the initial assessment below. This includes our plan of care. If you have any questions or concerns regarding these findings, please do not hesitate to contact me at the office phone number checked above. Thank you for the referral.       Physician Signature:_______________________________Date:__________________  By signing above (or electronic signature), therapists plan is approved by physician    Patient: Marixa Doty   : 1935   MRN: [id number]  Referring Physician: Referring Practitioner: Dr. 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 Patient reports chronic pathology. Continues to get worse pain down L>R shoulder and into bilateral arms. Noticed crepitus in the shoulders. XR with active movement did not show shifting patient reports. Sleeping well in regards to neck, gets up several times secondary to prostrate.  Reports odd feelings / tingling in face and shooting pain from ear to shoulder occasionally for short syndromes   []signs/symptoms consistent with discogenic cervical pain   []signs/symptoms consistent with rib dysfunction   [x]signs/symptoms consistent with postural dysfunction   []signs/symptoms consistent with shoulder pathology    []signs/symptoms consistent with post-surgical status including decreased ROM, strength and function. []signs/symptoms consistent with pathology which may benefit from Dry Needling   []signs/symptoms which may limit the use of advanced manual therapy techniques: (Elevated CV risk profile, recent trauma, intolerance to end range positions, prior TIA, visual issues, UE neurological compromise )     Prognosis/Rehab Potential:      []Excellent   []Good    [x]Fair   []Poor    Tolerance of evaluation/treatment:    []Excellent   [x]Good    []Fair   []Poor    Physical Therapy Evaluation Complexity Justification  [x] A history of present problem with:  [] no personal factors and/or comorbidities that impact the plan of care;  []1-2 personal factors and/or comorbidities that impact the plan of care  [x]3 personal factors and/or comorbidities that impact the plan of care  [x] An examination of body systems using standardized tests and measures addressing any of the following: body structures and functions (impairments), activity limitations, and/or participation restrictions;:  [] a total of 1-2 or more elements   [x] a total of 3 or more elements   [] a total of 4 or more elements   [x] A clinical presentation with:  [] stable and/or uncomplicated characteristics   [x] evolving clinical presentation with changing characteristics  [] unstable and unpredictable characteristics;   [x] Clinical decision making of [] low, [x] moderate, [] high complexity using standardized patient assessment instrument and/or measurable assessment of functional outcome.     [] EVAL (LOW) 12227 (typically 20 minutes face-to-face)  [x] EVAL (MOD) 62536 (typically 30 minutes face-to-face)  [] EVAL (HIGH) 67699 (typically 45 minutes face-to-face)  [] RE-EVAL     PLAN:   Frequency/Duration:  1-2 days per week for 4-6 Weeks:  Interventions:  [x]  Therapeutic exercise including: strength training, ROM, for cervical spine,scapula, core and Upper extremity, including postural re-education. [x]  NMR activation and proprioception for Deep cervical flexors, periscapular and RC muscles and Core, including postural re-education. [x]  Manual therapy as indicated for C/T spine, ribs, Soft tissue to include: Dry Needling/IASTM, STM, PROM, Gr I-IV mobilizations, manipulation. [x] Modalities as needed that may include: thermal agents, E-stim, Biofeedback, US, iontophoresis as indicated  [x] Patient education on joint protection, postural re-education, activity modification, progression of HEP. HEP instruction: (see scanned forms)    GOALS:  Patient stated goal: Shoulder mobility to apply neck medication to back; Improved balance    Therapist goals for Patient:   Short Term Goals: To be achieved in: 2 weeks  1. Independent in HEP and progression per patient tolerance, in order to prevent re-injury. 2. Patient will have a decrease in pain to facilitate improvement in movement, function, and ADLs as indicated by Functional Deficits. Long Term Goals: To be achieved in: 4-6 weeks  1. Disability index score of 28% or less for the NDI to assist with reaching prior level of function. 2. Patient will demonstrate increased AROM to [facility] of cervical/thoracic spine to allow for proper joint functioning as indicated by patients Functional Deficits. 3. Patient will demonstrate an increase in postural awareness and control and activation of  Deep cervical stabilizers to allow for proper functional mobility as indicated by patients Functional Deficits. 4. Patient will return to ADLs and functional activities such as applying medication to neck independently.          Electronically signed by:  Katie Bravo [id number]

## 2018-11-29 ENCOUNTER — HOSPITAL ENCOUNTER (OUTPATIENT)
Dept: PHYSICAL THERAPY | Age: 83
Discharge: HOME | End: 2018-11-29
Payer: MEDICARE

## 2018-11-29 PROCEDURE — 97110 THERAPEUTIC EXERCISES: CPT | Performed by: PHYSICAL THERAPIST

## 2018-11-29 PROCEDURE — 97140 MANUAL THERAPY 1/> REGIONS: CPT | Performed by: PHYSICAL THERAPIST

## 2018-11-29 PROCEDURE — G0283 ELEC STIM OTHER THAN WOUND: HCPCS | Performed by: PHYSICAL THERAPIST

## 2018-11-29 NOTE — FLOWSHEET NOTE
wrist 3/10   Functional questionnaire NDI 34%    ROM Cervical Flexion      Cervical Ext      Cervical SB R/L      Cervical rotation R/L     Strength                     RESTRICTIONS/PRECAUTIONS: HTN, B TKR, B THR, hx of falls, chronic neck and LBP (stenosis) , Hx of Lumbar sx     Exercises/Interventions:   Therapeutic Ex Sets/sec Reps Notes   UBE      T- band Row/pinch RTB 20 x  SBA    T- band lower pinch RTB 20 c  SBA    T- band ER activation RTB 20 x   HEP   UT stretch      Levator stretch      SBS 10 x 5\"   HEP         Supine pec stretch 3 x 20 \"      Supine Horz abduction RTB 15 x   HEP   Chin tuck 10 x 5\" supine/ seated 10 x 5\"  HEP   Chin tuck w lift 5 x 10\"     Chin tuck w rotation                  Manual Intervention      Cervical stretches X 3 min     Cervical traction (gentle) X 3 min   Negative sustained rotation for neighborhood signs                STM to UT X 4 minutes                 NMR re-education      T-spine Ext- foam roll      Chin tucks                         Traction                      Therapeutic Exercise and NMR EXR  [] (48787) Provided verbal/tactile cueing for activities related to strengthening, flexibility, endurance, ROM  for improvements in cervical, postural, scapular, scapulothoracic and UE control with self care, reaching, carrying, lifting, house/yardwork, driving/computer work.    [] (73802) Provided verbal/tactile cueing for activities related to improving balance, coordination, kinesthetic sense, posture, motor skill, proprioception  to assist with cervical, scapular, scapulothoracic and UE control with self care, reaching, carrying, lifting, house/yardwork, driving/computer work.     Therapeutic Activities:    [] (78139 or 64156) Provided verbal/tactile cueing for activities related to improving balance, coordination, kinesthetic sense, posture, motor skill, proprioception and motor activation to allow for proper function of cervical, scapular, scapulothoracic and UE control

## 2018-11-30 PROBLEM — M54.12 CERVICAL RADICULOPATHY, CHRONIC: Chronic | Status: ACTIVE | Noted: 2018-11-30

## 2018-11-30 PROBLEM — M47.812 CERVICAL SPONDYLOSIS WITHOUT MYELOPATHY: Status: ACTIVE | Noted: 2018-11-30

## 2018-11-30 PROBLEM — M48.02 CERVICAL STENOSIS OF SPINE: Status: ACTIVE | Noted: 2018-11-30

## 2018-11-30 PROBLEM — M47.812 CERVICAL SPONDYLOSIS WITHOUT MYELOPATHY: Chronic | Status: ACTIVE | Noted: 2018-11-30

## 2018-11-30 PROBLEM — M48.02 CERVICAL STENOSIS OF SPINE: Chronic | Status: ACTIVE | Noted: 2018-11-30

## 2018-11-30 PROBLEM — M54.12 CERVICAL RADICULOPATHY, CHRONIC: Status: ACTIVE | Noted: 2018-11-30

## 2018-12-06 ENCOUNTER — HOSPITAL ENCOUNTER (OUTPATIENT)
Dept: PHYSICAL THERAPY | Age: 83
Setting detail: THERAPIES SERIES
Discharge: HOME | End: 2018-12-06
Payer: MEDICARE

## 2018-12-06 PROCEDURE — 97110 THERAPEUTIC EXERCISES: CPT

## 2018-12-06 PROCEDURE — 97140 MANUAL THERAPY 1/> REGIONS: CPT

## 2018-12-06 PROCEDURE — G0283 ELEC STIM OTHER THAN WOUND: HCPCS

## 2018-12-06 NOTE — FLOWSHEET NOTE
NDI 34%    ROM Cervical Flexion      Cervical Ext      Cervical SB R/L      Cervical rotation R/L     Strength                     RESTRICTIONS/PRECAUTIONS: HTN, B TKR, B THR, hx of falls, chronic neck and LBP (stenosis) , Hx of Lumbar sx     Exercises/Interventions:   Therapeutic Ex Sets/sec Reps Notes   UBE      T- band Row/pinch RTB 20 x  SBA Seated in chair   T- band lower pinch RTB 20 c  SBA Seated in chair   T- band ER activation RTB 20 x   HEP   UT stretch      Levator stretch      SBS 10 x 5\"   HEP         Supine pec stretch 3 x 20 \"      Supine Horz abduction RTB 15 x   HEP   Chin tuck 10 x 5\" supine/ seated 10 x 5\"  HEP   Chin tuck w lift 5 x 10\"     Chin tuck w rotation                  Manual Intervention      Cervical stretches X 3 min     Cervical traction (gentle) X 3 min   Negative sustained rotation for neighborhood signs                STM to UT X 4 minutes                 NMR re-education      T-spine Ext- foam roll      Chin tucks                         Traction                      Therapeutic Exercise and NMR EXR  [x] (65451) Provided verbal/tactile cueing for activities related to strengthening, flexibility, endurance, ROM  for improvements in cervical, postural, scapular, scapulothoracic and UE control with self care, reaching, carrying, lifting, house/yardwork, driving/computer work.    [] (22588) Provided verbal/tactile cueing for activities related to improving balance, coordination, kinesthetic sense, posture, motor skill, proprioception  to assist with cervical, scapular, scapulothoracic and UE control with self care, reaching, carrying, lifting, house/yardwork, driving/computer work.     Therapeutic Activities:    [x] (03258 or 68999) Provided verbal/tactile cueing for activities related to improving balance, coordination, kinesthetic sense, posture, motor skill, proprioception and motor activation to allow for proper function of cervical, scapular, scapulothoracic and UE control with

## 2018-12-11 ENCOUNTER — HOSPITAL ENCOUNTER (OUTPATIENT)
Age: 83
Discharge: HOME | End: 2018-12-11

## 2018-12-11 PROCEDURE — 9900000038 HC CARDIAC REHAB PHASE 3 - MONTHLY

## 2018-12-13 ENCOUNTER — HOSPITAL ENCOUNTER (OUTPATIENT)
Dept: PHYSICAL THERAPY | Age: 83
Discharge: HOME | End: 2018-12-13
Payer: MEDICARE

## 2018-12-13 PROCEDURE — 97110 THERAPEUTIC EXERCISES: CPT | Performed by: PHYSICAL THERAPIST

## 2018-12-13 PROCEDURE — G0283 ELEC STIM OTHER THAN WOUND: HCPCS | Performed by: PHYSICAL THERAPIST

## 2018-12-13 PROCEDURE — 97140 MANUAL THERAPY 1/> REGIONS: CPT | Performed by: PHYSICAL THERAPIST

## 2018-12-13 NOTE — FLOWSHEET NOTE
[facility], [address] [place]  Phone: [phone]  Fax [phone]    Physical Therapy Daily Treatment Note  Date:  2018    Patient Name:  Myra Payne    :  1935  MRN: [id number]  Restrictions/Precautions:    Physician Information:  Referring Practitioner: Dr. 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 Cervical Spondylosis with Myelopathy  · Treatment Diagnosis: Cervical Pain (M54.5) / Cervical Radiculopathy (M54.12)    [x] Conservative / [] Surgical - DOS:  Therapy Diagnosis/Practice Pattern:  Practice Pattern F: Spinal Disorders  Insurance/Certification information:  PT Insurance Information: medicare   Plan of care signed: [] YES  [x] NO  Number of Comorbidities:  []0     []1-2    [x]3+  Date of Patient follow up with Physician:     G-Code (if applicable):      Date G-Code Applied:  18  PT G-Codes  Functional Assessment Tool Used: NDI  Score: 34%  Functional Limitation: Changing and maintaining body position  Changing and Maintaining Body Position Current Status (): At least 20 percent but less than 40 percent impaired, limited or restricted  Changing and Maintaining Body Position Goal Status (): At least 20 percent but less than 40 percent impaired, limited or restricted    Progress Note: [x]  Yes  []  No  Next due by: Visit #10        Latex Allergy:  [x]NO      []YES  Preferred Language for Healthcare:   [x]English       []other:    Visit # Insurance Allowable Reporting Period   4 MEDICARE ([id number] 18) Begin Date: 2018               End Date:      RECERT DUE BY: 6 weeks    SUBJECTIVE:  Patient received cervical spine epidural injection on Tuesday this week. Felt great that day and the next but feels soreness returning today. Patient reports bilateral arm pain int he bicep but denies into the hands today. Limited on time today.   Wants to continue 1 more session then d/c . OBJECTIVE:   Observation:  Palpation:     Test used Initial score Current Score   Pain Summary VAS 2-9/10 1-2/10 neck; hands / wrist 3/10   Functional questionnaire NDI 34%    ROM Cervical Flexion      Cervical Ext      Cervical SB R/L      Cervical rotation R/L     Strength                     RESTRICTIONS/PRECAUTIONS: HTN, B TKR, B THR, hx of falls, chronic neck and LBP (stenosis) , Hx of Lumbar sx     Exercises/Interventions:   Therapeutic Ex Sets/sec Reps Notes   UBE      T- band Row/pinch RTB 20 x  SBA Seated in chair   T- band lower pinch RTB 20 c  SBA Seated in chair   T- band ER activation RTB 20 x   HEP   UT stretch      Levator stretch      SBS 10 x 5\"   HEP         Supine pec stretch 3 x 20 \"      Supine Horz abduction RTB 15 x   HEP   Chin tuck 10 x 5\" supine/ seated 10 x 5\"  HEP   Chin tuck w lift 5 x 10\"     Chin tuck w rotation                  Manual Intervention      Cervical stretches X 3 min     Cervical traction (gentle) X 3 min   Negative sustained rotation for neighborhood signs                STM to UT X 4 minutes                 NMR re-education      T-spine Ext- foam roll      Chin tucks                         Traction                      Therapeutic Exercise and NMR EXR  [x] (20367) Provided verbal/tactile cueing for activities related to strengthening, flexibility, endurance, ROM  for improvements in cervical, postural, scapular, scapulothoracic and UE control with self care, reaching, carrying, lifting, house/yardwork, driving/computer work.    [] (69147) Provided verbal/tactile cueing for activities related to improving balance, coordination, kinesthetic sense, posture, motor skill, proprioception  to assist with cervical, scapular, scapulothoracic and UE control with self care, reaching, carrying, lifting, house/yardwork, driving/computer work.     Therapeutic Activities:    [x] (45638 or 53792) Provided verbal/tactile cueing for activities

## 2018-12-20 ENCOUNTER — HOSPITAL ENCOUNTER (OUTPATIENT)
Dept: PHYSICAL THERAPY | Age: 83
Discharge: HOME | End: 2018-12-20
Payer: MEDICARE

## 2018-12-20 PROCEDURE — G8982 BODY POS GOAL STATUS: HCPCS | Performed by: PHYSICAL THERAPIST

## 2018-12-20 PROCEDURE — 97140 MANUAL THERAPY 1/> REGIONS: CPT | Performed by: PHYSICAL THERAPIST

## 2018-12-20 PROCEDURE — 97110 THERAPEUTIC EXERCISES: CPT | Performed by: PHYSICAL THERAPIST

## 2018-12-20 PROCEDURE — G0283 ELEC STIM OTHER THAN WOUND: HCPCS | Performed by: PHYSICAL THERAPIST

## 2018-12-20 PROCEDURE — G8981 BODY POS CURRENT STATUS: HCPCS | Performed by: PHYSICAL THERAPIST

## 2018-12-20 PROCEDURE — G8983 BODY POS D/C STATUS: HCPCS | Performed by: PHYSICAL THERAPIST

## 2018-12-20 NOTE — FLOWSHEET NOTE
today. More isolated to the left side of neck. Had difficulty turning head. Improved today. Some discomfort into the shoulders but minimal in the hands / wrist today. OBJECTIVE:   Observation:  Palpation:     Test used Initial score Current Score   Pain Summary VAS 2-9/10 2/10 neck;     Functional questionnaire NDI 34% 14%    ROM Cervical Flexion      Cervical Ext      Cervical SB R/L      Cervical rotation R/L     Strength                     RESTRICTIONS/PRECAUTIONS: HTN, B TKR, B THR, hx of falls, chronic neck and LBP (stenosis) , Hx of Lumbar sx     Exercises/Interventions:   Therapeutic Ex Sets/sec Reps Notes   UBE      T- band Row/pinch RTB 20 x  SBA Seated in chair   T- band lower pinch RTB 20 c  SBA Seated in chair   T- band ER activation RTB 20 x   HEP   UT stretch      Levator stretch       HEP         Supine pec stretch 3 x 20 \"      Supine Horz abduction RTB 15 x   HEP   Chin tuck 10 x 5\" supine/ seated 10 x 5\"  HEP   Chin tuck w lift 5 x 10\"     Chin tuck w rotation                  Manual Intervention      Cervical stretches X 3 min     Cervical traction (gentle) X 3 min   Negative sustained rotation for neighborhood signs                STM to UT X 4 minutes                 NMR re-education      T-spine Ext- foam roll      Chin tucks                         Traction                      Therapeutic Exercise and NMR EXR  [x] (87100) Provided verbal/tactile cueing for activities related to strengthening, flexibility, endurance, ROM  for improvements in cervical, postural, scapular, scapulothoracic and UE control with self care, reaching, carrying, lifting, house/yardwork, driving/computer work.    [] (11135) Provided verbal/tactile cueing for activities related to improving balance, coordination, kinesthetic sense, posture, motor skill, proprioception  to assist with cervical, scapular, scapulothoracic and UE control with self care, reaching, carrying, lifting, house/yardwork, driving/computer (33135):     GOALS:  Patient stated goal: Shoulder mobility to apply neck medication to back; Improved balance     Therapist goals for Patient:   Short Term Goals: To be achieved in: 2 weeks  1. Independent in HEP and progression per patient tolerance, in order to prevent re-injury.  - MET  2. Patient will have a decrease in pain to facilitate improvement in movement, function, and ADLs as indicated by Functional Deficits. - MET     Long Term Goals: To be achieved in: 4-6 weeks  1. Disability index score of 28% or less for the NDI to assist with reaching prior level of function. - MET, NDI 14%  2. Patient will demonstrate increased AROM to [facility] of cervical/thoracic spine to allow for proper joint functioning as indicated by patients Functional Deficits. - Progressing  3. Patient will demonstrate an increase in postural awareness and control and activation of  Deep cervical stabilizers to allow for proper functional mobility as indicated by patients Functional Deficits. - Progressing   4. Patient will return to ADLs and functional activities such as applying medication to neck independently. - MET                    New or Updated Goals (if applicable):  [x] No change to goals established upon initial eval/last progress note:  New Goals:    Progression Towards Functional goals:   [] Patient is progressing as expected towards functional goals listed. [] Progression is slowed due to complexities listed. [] Progression has been slowed due to co-morbidities.   [x] Plan just implemented, too soon to assess goals progression  [] Other:     ASSESSMENT:    [] Improvement noted relative to goals:  [] No Improvement noted related to goals:  Summary/Patient's response to treatment:  See discharge    Treatment/Activity Tolerance:  [x] Patient tolerated treatment well [] Patient limited by fatique  [] Patient limited by pain  [] Patient limited by other medical complications  [] Other:     Prognosis: [] Good [x] Fair  []

## 2019-01-10 ENCOUNTER — HOSPITAL ENCOUNTER (OUTPATIENT)
Age: 84
Discharge: HOME | End: 2019-01-10

## 2019-01-10 PROCEDURE — 9900000038 HC CARDIAC REHAB PHASE 3 - MONTHLY

## 2019-02-19 ENCOUNTER — HOSPITAL ENCOUNTER (OUTPATIENT)
Age: 84
Discharge: HOME | End: 2019-02-19

## 2019-02-19 PROCEDURE — 9900000038 HC CARDIAC REHAB PHASE 3 - MONTHLY

## 2019-03-06 ENCOUNTER — HOSPITAL ENCOUNTER (OUTPATIENT)
Age: 84
Discharge: HOME | End: 2019-03-06

## 2019-03-06 PROCEDURE — 9900000038 HC CARDIAC REHAB PHASE 3 - MONTHLY

## 2019-04-02 ENCOUNTER — HOSPITAL ENCOUNTER (OUTPATIENT)
Age: 84
Discharge: HOME | End: 2019-04-02

## 2019-04-02 PROCEDURE — 9900000038 HC CARDIAC REHAB PHASE 3 - MONTHLY

## 2019-04-17 PROBLEM — M48.061 SPINAL STENOSIS OF LUMBAR REGION: Status: ACTIVE | Noted: 2019-04-17

## 2019-04-17 PROBLEM — M50.20 CERVICAL DISC DISPLACEMENT: Status: ACTIVE | Noted: 2019-04-17

## 2019-04-17 PROBLEM — M48.02 CERVICAL STENOSIS OF SPINAL CANAL: Status: ACTIVE | Noted: 2019-04-17

## 2019-04-17 PROBLEM — M47.816 LUMBAR FACET ARTHROPATHY: Status: ACTIVE | Noted: 2019-04-17

## 2019-04-17 PROBLEM — M47.812 OSTEOARTHRITIS OF CERVICAL SPINE: Status: ACTIVE | Noted: 2019-04-17

## 2019-04-17 PROBLEM — M51.26 DISC DISPLACEMENT, LUMBAR: Status: ACTIVE | Noted: 2019-04-17

## 2019-05-14 ENCOUNTER — HOSPITAL ENCOUNTER (OUTPATIENT)
Age: 84
Discharge: HOME | End: 2019-05-14

## 2019-05-14 PROCEDURE — 9900000038 HC CARDIAC REHAB PHASE 3 - MONTHLY

## 2019-05-21 ENCOUNTER — OFFICE VISIT (OUTPATIENT)
Dept: ORTHOPEDIC SURGERY | Age: 84
End: 2019-05-21
Payer: MEDICARE

## 2019-05-21 VITALS
HEART RATE: 99 BPM | HEIGHT: 67 IN | DIASTOLIC BLOOD PRESSURE: 82 MMHG | BODY MASS INDEX: 40.48 KG/M2 | WEIGHT: 257.94 LBS | SYSTOLIC BLOOD PRESSURE: 143 MMHG

## 2019-05-21 DIAGNOSIS — M48.02 CERVICAL STENOSIS OF SPINAL CANAL: Primary | ICD-10-CM

## 2019-05-21 PROCEDURE — 4040F PNEUMOC VAC/ADMIN/RCVD: CPT | Performed by: ORTHOPAEDIC SURGERY

## 2019-05-21 PROCEDURE — G8417 CALC BMI ABV UP PARAM F/U: HCPCS | Performed by: ORTHOPAEDIC SURGERY

## 2019-05-21 PROCEDURE — G8427 DOCREV CUR MEDS BY ELIG CLIN: HCPCS | Performed by: ORTHOPAEDIC SURGERY

## 2019-05-21 PROCEDURE — 99213 OFFICE O/P EST LOW 20 MIN: CPT | Performed by: ORTHOPAEDIC SURGERY

## 2019-05-21 PROCEDURE — 1036F TOBACCO NON-USER: CPT | Performed by: ORTHOPAEDIC SURGERY

## 2019-05-21 PROCEDURE — 1123F ACP DISCUSS/DSCN MKR DOCD: CPT | Performed by: ORTHOPAEDIC SURGERY

## 2019-06-04 PROCEDURE — 9900000038 HC CARDIAC REHAB PHASE 3 - MONTHLY

## 2019-06-24 ENCOUNTER — HOSPITAL ENCOUNTER (OUTPATIENT)
Dept: CARDIAC REHAB | Age: 84
Discharge: HOME | End: 2019-06-24

## 2019-07-15 ENCOUNTER — OFFICE VISIT (OUTPATIENT)
Dept: ORTHOPEDIC SURGERY | Age: 84
End: 2019-07-15
Payer: MEDICARE

## 2019-07-15 VITALS — WEIGHT: 257.94 LBS | HEIGHT: 67 IN | BODY MASS INDEX: 40.48 KG/M2

## 2019-07-15 DIAGNOSIS — M48.062 LUMBAR STENOSIS WITH NEUROGENIC CLAUDICATION: ICD-10-CM

## 2019-07-15 DIAGNOSIS — M25.552 PAIN OF BOTH HIP JOINTS: Primary | ICD-10-CM

## 2019-07-15 DIAGNOSIS — M25.551 PAIN OF BOTH HIP JOINTS: Primary | ICD-10-CM

## 2019-07-15 PROCEDURE — 1036F TOBACCO NON-USER: CPT | Performed by: PHYSICIAN ASSISTANT

## 2019-07-15 PROCEDURE — 99213 OFFICE O/P EST LOW 20 MIN: CPT | Performed by: PHYSICIAN ASSISTANT

## 2019-07-15 PROCEDURE — 4040F PNEUMOC VAC/ADMIN/RCVD: CPT | Performed by: PHYSICIAN ASSISTANT

## 2019-07-15 PROCEDURE — 1123F ACP DISCUSS/DSCN MKR DOCD: CPT | Performed by: PHYSICIAN ASSISTANT

## 2019-07-15 PROCEDURE — G8427 DOCREV CUR MEDS BY ELIG CLIN: HCPCS | Performed by: PHYSICIAN ASSISTANT

## 2019-07-15 PROCEDURE — G8417 CALC BMI ABV UP PARAM F/U: HCPCS | Performed by: PHYSICIAN ASSISTANT

## 2019-07-16 PROCEDURE — 9900000038 HC CARDIAC REHAB PHASE 3 - MONTHLY

## 2019-07-16 NOTE — PROGRESS NOTES
History: Medical history form was reviewed today & scanned into the media tab  Past Medical History:   Diagnosis Date    Arthritis     Asthma     BPH     CAD (coronary artery disease)     Chest pain     Hearing decreased     HEARING AIDS    Hyperlipidemia     Hypertension     Need for post exposure prophylaxis for rabies 1980    Thyroid disease     hypothyroidism    Wears glasses       Past Surgical History:     Past Surgical History:   Procedure Laterality Date    BACK SURGERY      DISCECTOMY    BACK SURGERY  11/2017    lamenectomy, facetotomy, decompression l2-3, l3-4, l4-5    CATARACT REMOVAL Left 6/17/16    CATARACT REMOVAL WITH IMPLANT Right     COLONOSCOPY  2011    COLONOSCOPY  6/28/11    polyps    COLONOSCOPY  8/14    polyps    CORONARY ANGIOPLASTY WITH STENT PLACEMENT  0718/2011    CYST REMOVAL  1/3/2014    central back    FINGER SURGERY      GROWTH ON FIRST FINGER REMOVED    FRACTURE SURGERY  age 14    nose    HIP SURGERY Left 04/19/2017    LEFT LATERAL TOTAL HIP REPLACEMENT WITH CELLSAVER AND    JOINT REPLACEMENT Right 2006    TKR    JOINT REPLACEMENT Left 11/2013    TKR    JOINT REPLACEMENT Right 2008    THR    KNEE SURGERY      LUMBAR LAMINECTOMY N/A 01/06/2017    micro lumbar laminectomy L2-L3     Current Medications:     Current Outpatient Medications:     oxyCODONE-acetaminophen (PERCOCET) 5-325 MG per tablet, Take 1 tablet by mouth 2 times daily as needed for Pain for up to 30 days. , Disp: 60 tablet, Rfl: 0    naloxone 4 MG/0.1ML LIQD nasal spray, 1 spray by Nasal route as needed for Opioid Reversal, Disp: 1 each, Rfl: 0    diclofenac sodium (VOLTAREN) 1 % GEL, APPLY 4 GRAMS TOPICALLY FOUR TIMES DAILY, Disp: 500 g, Rfl: 5    Tens Unit MISC, by Does not apply route, Disp: 1 each, Rfl: 0    melatonin 5 MG TABS tablet, Take 5 mg by mouth daily, Disp: , Rfl:     torsemide (DEMADEX) 10 MG tablet, TAKE 1 TAB BY MOUTH DAILY. , Disp: , Rfl: 3    metoprolol succinate (TOPROL XL) 25 MG extended release tablet, Take 1 tablet by mouth daily, Disp: 30 tablet, Rfl: 1    pregabalin (LYRICA) 75 MG capsule, Take 1 capsule by mouth 2 times daily for 30 days. ., Disp: 60 capsule, Rfl: 0    peppermint oil liquid, Take 1 capsule by mouth as needed (FOR STOMACH UPSET) , Disp: , Rfl:     albuterol sulfate  (90 BASE) MCG/ACT inhaler, Inhale 2 puffs into the lungs every 6 hours as needed for Wheezing, Disp: , Rfl:     levothyroxine (SYNTHROID) 75 MCG tablet, Take 75 mcg by mouth Daily, Disp: , Rfl:     Vitamin D (CHOLECALCIFEROL) 1000 UNITS CAPS capsule, Take 1,000 Units by mouth daily, Disp: , Rfl:     aspirin 81 MG tablet, Take 81 mg by mouth daily, Disp: , Rfl:     atorvastatin (LIPITOR) 20 MG tablet, Take 20 mg by mouth nightly., Disp: , Rfl:     tamsulosin (FLOMAX) 0.4 MG capsule, Take 0.4 mg by mouth 2 times daily. , Disp: , Rfl:     finasteride (PROSCAR) 5 MG tablet, Take 5 mg by mouth daily. , Disp: , Rfl:     therapeutic multivitamin-minerals (THERAGRAN-M) tablet, Take 1 tablet by mouth daily. , Disp: , Rfl:   Allergies:  Erythromycin; Penicillins; Sulfa antibiotics; Tetanus toxoids; and Tetracyclines & related  Social History:    reports that he quit smoking about 59 years ago. His smoking use included cigarettes. He has a 1.00 pack-year smoking history. He has never used smokeless tobacco. He reports that he drinks alcohol. He reports that he does not use drugs.   Family History:   Family History   Problem Relation Age of Onset    Cancer Father         bladder       REVIEW OF SYSTEMS: Full ROS noted & scanned   CONSTITUTIONAL: Denies unexplained weight loss, fevers, chills or fatigue  NEUROLOGICAL: Denies unsteady gait or progressive weakness  MUSCULOSKELETAL: Denies joint swelling or redness  PSYCHOLOGICAL: Denies anxiety, depression   SKIN: Denies skin changes, delayed healing, rash, itching   HEMATOLOGIC: Denies easy bleeding or bruising  ENDOCRINE: Denies excessive thirst,

## 2019-07-29 ENCOUNTER — HOSPITAL ENCOUNTER (OUTPATIENT)
Dept: CARDIAC REHAB | Age: 84
Discharge: HOME | End: 2019-07-29

## 2019-08-02 PROCEDURE — 9900000038 HC CARDIAC REHAB PHASE 3 - MONTHLY

## 2019-08-26 ENCOUNTER — HOSPITAL ENCOUNTER (OUTPATIENT)
Dept: CARDIAC REHAB | Age: 84
Discharge: HOME | End: 2019-08-26

## 2019-09-10 PROCEDURE — 9900000038 HC CARDIAC REHAB PHASE 3 - MONTHLY

## 2019-09-30 ENCOUNTER — HOSPITAL ENCOUNTER (OUTPATIENT)
Dept: CARDIAC REHAB | Age: 84
Discharge: HOME | End: 2019-09-30

## 2019-10-01 PROCEDURE — 9900000038 HC CARDIAC REHAB PHASE 3 - MONTHLY

## 2019-10-16 ENCOUNTER — OFFICE VISIT (OUTPATIENT)
Dept: ORTHOPEDIC SURGERY | Age: 84
End: 2019-10-16
Payer: MEDICARE

## 2019-10-16 VITALS — BODY MASS INDEX: 40.48 KG/M2 | HEIGHT: 67 IN | WEIGHT: 257.94 LBS

## 2019-10-16 DIAGNOSIS — M48.062 SPINAL STENOSIS OF LUMBAR REGION WITH NEUROGENIC CLAUDICATION: Primary | ICD-10-CM

## 2019-10-16 PROCEDURE — G8417 CALC BMI ABV UP PARAM F/U: HCPCS | Performed by: ORTHOPAEDIC SURGERY

## 2019-10-16 PROCEDURE — G8427 DOCREV CUR MEDS BY ELIG CLIN: HCPCS | Performed by: ORTHOPAEDIC SURGERY

## 2019-10-16 PROCEDURE — 4040F PNEUMOC VAC/ADMIN/RCVD: CPT | Performed by: ORTHOPAEDIC SURGERY

## 2019-10-16 PROCEDURE — 1036F TOBACCO NON-USER: CPT | Performed by: ORTHOPAEDIC SURGERY

## 2019-10-16 PROCEDURE — G8484 FLU IMMUNIZE NO ADMIN: HCPCS | Performed by: ORTHOPAEDIC SURGERY

## 2019-10-16 PROCEDURE — 1123F ACP DISCUSS/DSCN MKR DOCD: CPT | Performed by: ORTHOPAEDIC SURGERY

## 2019-10-16 PROCEDURE — 99213 OFFICE O/P EST LOW 20 MIN: CPT | Performed by: ORTHOPAEDIC SURGERY

## 2019-10-28 ENCOUNTER — HOSPITAL ENCOUNTER (OUTPATIENT)
Dept: CARDIAC REHAB | Age: 84
Discharge: HOME | End: 2019-10-28

## 2019-11-05 PROCEDURE — 9900000038 HC CARDIAC REHAB PHASE 3 - MONTHLY

## 2019-11-25 ENCOUNTER — HOSPITAL ENCOUNTER (OUTPATIENT)
Dept: CARDIAC REHAB | Age: 84
Discharge: HOME | End: 2019-11-25

## 2019-12-03 PROCEDURE — 9900000038 HC CARDIAC REHAB PHASE 3 - MONTHLY

## 2019-12-30 ENCOUNTER — HOSPITAL ENCOUNTER (OUTPATIENT)
Dept: CARDIAC REHAB | Age: 84
Discharge: HOME | End: 2019-12-30

## 2020-01-06 ENCOUNTER — OFFICE VISIT (OUTPATIENT)
Dept: ORTHOPEDIC SURGERY | Age: 85
End: 2020-01-06
Payer: MEDICARE

## 2020-01-06 VITALS — BODY MASS INDEX: 40.48 KG/M2 | HEIGHT: 67 IN | WEIGHT: 257.94 LBS

## 2020-01-06 PROCEDURE — 4040F PNEUMOC VAC/ADMIN/RCVD: CPT | Performed by: ORTHOPAEDIC SURGERY

## 2020-01-06 PROCEDURE — 99213 OFFICE O/P EST LOW 20 MIN: CPT | Performed by: ORTHOPAEDIC SURGERY

## 2020-01-06 PROCEDURE — G8417 CALC BMI ABV UP PARAM F/U: HCPCS | Performed by: ORTHOPAEDIC SURGERY

## 2020-01-06 PROCEDURE — 1036F TOBACCO NON-USER: CPT | Performed by: ORTHOPAEDIC SURGERY

## 2020-01-06 PROCEDURE — G8484 FLU IMMUNIZE NO ADMIN: HCPCS | Performed by: ORTHOPAEDIC SURGERY

## 2020-01-06 PROCEDURE — G8427 DOCREV CUR MEDS BY ELIG CLIN: HCPCS | Performed by: ORTHOPAEDIC SURGERY

## 2020-01-06 PROCEDURE — 1123F ACP DISCUSS/DSCN MKR DOCD: CPT | Performed by: ORTHOPAEDIC SURGERY

## 2020-01-06 NOTE — PROGRESS NOTES
HPI  Mr. Soraida David is almost 3 years status post micro-lumbar decompression by me and 2 years status post L2 to L5 laminectomy by Dr. Yeimy De La Rosa. He describes increased right greater than left leg pain that began about 4 to 6 weeks ago. His pain radiates down the posterior aspects of his legs to the calves. He is tried oral steroids and Flexeril without relief. He has been using cane for past few years. Denies bowel/bladder dysfunction. He has been seeing Dr. Lacey Sommer for cervical and lumbar epidural injections. Last lumbar injection was about 1 year ago. His taking Percocet, diclofenac, and Lyrica. He has a lumbar MRI scheduled for 3 days from now. General Exam:  Pt is alert and oriented x 3 and in no acute distress. Gait is heel toe with no limp or instability. Mood and affect are appropriate. Back:  No deformity. He has 2 midline surgical scars  Good ROM with mild pain. Negative pain on palpation. Lower Extremities:  Bilateral lower extremity with 5/5 motor function  Sensation intact to light touch L3-S1. Normal deep tendon reflex at knees and ankles. No clonus. Negative straight leg raise, bilaterally  Normal painfree range of motion ankles, knees or hips. No cyanosis, edema or rash and the vasculature is intact. EMG shows mild chronic bilateral L5 and S1 polyradiculopathy. I reviewed MRI images of his lumbar spine from 3/15/18 the office today. They show wide laminectomy some L2 to L5. He has persistent central stenosis at the caudal end of his laminectomies and moderate bilateral foraminal stenosis L4-L5. He has a degenerative slip at L4-L5. Reviewed AP, lateral, flexion, and extension xrays of his lumbar spine obtained in the office today. They show no additional instability of his degenerative slip at L4-5. Multilevel degenerative disc changes. Impression:   Diagnosis Orders   1.  Lumbar stenosis with neurogenic claudication       Discussed treatment options including observation,

## 2020-01-13 ENCOUNTER — TELEPHONE (OUTPATIENT)
Dept: ORTHOPEDIC SURGERY | Age: 85
End: 2020-01-13

## 2020-01-13 PROCEDURE — 9900000038 HC CARDIAC REHAB PHASE 3 - MONTHLY

## 2020-01-27 ENCOUNTER — HOSPITAL ENCOUNTER (OUTPATIENT)
Dept: CARDIAC REHAB | Age: 85
Discharge: HOME | End: 2020-01-27

## 2020-02-04 PROCEDURE — 9900000038 HC CARDIAC REHAB PHASE 3 - MONTHLY

## 2020-02-24 ENCOUNTER — HOSPITAL ENCOUNTER (OUTPATIENT)
Dept: CARDIAC REHAB | Age: 85
Discharge: HOME | End: 2020-02-24

## 2020-03-18 PROCEDURE — 9900000038 HC CARDIAC REHAB PHASE 3 - MONTHLY

## 2020-03-19 NOTE — TELEPHONE ENCOUNTER
Called and spoke with patient about MRI results. Dr. Haley Juarez states that his MRI is similar to his last MRI. Shows some stenosis. He recommends him returning to see Dr. Martha Núñez. He is already scheduled to follow up with Dr. Martha Núñez in few weeks. Mom states Ximena is scheduled for a 4 month WCE at 11:15 today.  She had a fever yesterday and has a croupy cough along with goopy eyes.  Mom is wondering if she should still come in today. No travel    2nd contact phone number #    Best time to call them back ASAP     IF CALLING AFTER 4PM - Please advise parent they will receive a call tomorrow.     Parent agreed? [] Yes [] No

## 2020-03-30 ENCOUNTER — HOSPITAL ENCOUNTER (OUTPATIENT)
Dept: CARDIAC REHAB | Age: 85
Discharge: HOME | End: 2020-03-30

## 2020-05-26 ENCOUNTER — OFFICE VISIT (OUTPATIENT)
Dept: ORTHOPEDIC SURGERY | Age: 85
End: 2020-05-26
Payer: MEDICARE

## 2020-05-26 PROCEDURE — G8427 DOCREV CUR MEDS BY ELIG CLIN: HCPCS | Performed by: ORTHOPAEDIC SURGERY

## 2020-05-26 PROCEDURE — 1123F ACP DISCUSS/DSCN MKR DOCD: CPT | Performed by: ORTHOPAEDIC SURGERY

## 2020-05-26 PROCEDURE — 1036F TOBACCO NON-USER: CPT | Performed by: ORTHOPAEDIC SURGERY

## 2020-05-26 PROCEDURE — 4040F PNEUMOC VAC/ADMIN/RCVD: CPT | Performed by: ORTHOPAEDIC SURGERY

## 2020-05-26 PROCEDURE — G8417 CALC BMI ABV UP PARAM F/U: HCPCS | Performed by: ORTHOPAEDIC SURGERY

## 2020-05-26 PROCEDURE — 99213 OFFICE O/P EST LOW 20 MIN: CPT | Performed by: ORTHOPAEDIC SURGERY

## 2020-05-26 NOTE — PROGRESS NOTES
(LIPITOR) 20 MG tablet Take 20 mg by mouth nightly.  tamsulosin (FLOMAX) 0.4 MG capsule Take 0.4 mg by mouth 2 times daily.  finasteride (PROSCAR) 5 MG tablet Take 5 mg by mouth daily.  therapeutic multivitamin-minerals (THERAGRAN-M) tablet Take 1 tablet by mouth daily.  pregabalin (LYRICA) 75 MG capsule Take 1 capsule by mouth 2 times daily for 30 days. . 60 capsule 0     No current facility-administered medications on file prior to visit. Allergies   Allergen Reactions    Erythromycin Hives    Penicillins Hives    Sulfa Antibiotics Hives    Tetanus Toxoids Hives    Tetracyclines & Related        Review of Systems:  Relevant review of systems reviewed and available in the patient's chart    Vital Signs: There were no vitals filed for this visit. General Exam:   Constitutional: Patient is adequately groomed with no evidence of malnutrition  Mental Status: The patient is oriented to time, place and person. The patient's mood and affect are appropriate. Lymphatic: The lymphatic examination bilaterally reveals all areas to be without enlargement or induration. Vascular: Examination reveals no swelling or calf tenderness. Peripheral pulses are palpable and 2+. Neurological: The patient has good coordination. There is no weakness or sensory deficit. Right hip Examination:    Inspection:  No erythema swelling or signs of infection    Palpation:  Tenderness to palpation of the lateral aspect over the greater trochanter    Range of Motion:  Full range of motion but does have some discomfort laterally at the hip    Strength:  5/5 hip flexion and abduction and adduction    Special Tests:  Positive Hakeem's test.  Negative logroll maneuver    Skin: There are no rashes, ulcerations or lesions.     Gait: Normal    Reflex 2+ patellar    Additional Comments:       Additional Examinations:         Left Lower Extremity: Examination of the left lower extremity does not show any tenderness,

## 2020-05-26 NOTE — PROGRESS NOTES
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    Primary osteoarthritis of left hip 03/20/2017    Left hip pain 03/20/2017    Encounter for postoperative wound check 01/17/2014    Hypertension     Thyroid disease      Past Surgical History:   Procedure Laterality Date    BACK SURGERY      DISCECTOMY    BACK SURGERY  11/2017    lamenectomy, facetotomy, decompression l2-3, l3-4, l4-5    CATARACT REMOVAL Left 6/17/16    CATARACT REMOVAL WITH IMPLANT Right     COLONOSCOPY  2011    COLONOSCOPY  6/28/11    polyps    COLONOSCOPY  8/14    polyps    CORONARY ANGIOPLASTY WITH STENT PLACEMENT  0718/2011    CYST REMOVAL  1/3/2014    central back    FINGER SURGERY      GROWTH ON FIRST FINGER REMOVED    FRACTURE SURGERY  age 14    nose    HIP SURGERY Left 04/19/2017    LEFT LATERAL TOTAL HIP REPLACEMENT WITH CELLSAVER AND    JOINT REPLACEMENT Right 2006    TKR    JOINT REPLACEMENT Left 11/2013    TKR    JOINT REPLACEMENT Right 2008    THR    KNEE SURGERY      LUMBAR LAMINECTOMY N/A 01/06/2017    micro lumbar laminectomy L2-L3     Family History   Problem Relation Age of Onset    Cancer Father         bladder     Social History     Socioeconomic History    Marital status:      Spouse name: None    Number of children: None    Years of education: None    Highest education level: None   Occupational History    None   Social Needs    Financial resource strain: None    Food insecurity     Worry: None     Inability: None    Transportation needs     Medical: None     Non-medical: None   Tobacco (CHOLECALCIFEROL) 1000 UNITS CAPS capsule Take 1,000 Units by mouth daily      aspirin 81 MG tablet Take 81 mg by mouth daily      atorvastatin (LIPITOR) 20 MG tablet Take 20 mg by mouth nightly.  tamsulosin (FLOMAX) 0.4 MG capsule Take 0.4 mg by mouth 2 times daily.  finasteride (PROSCAR) 5 MG tablet Take 5 mg by mouth daily.  therapeutic multivitamin-minerals (THERAGRAN-M) tablet Take 1 tablet by mouth daily.  pregabalin (LYRICA) 75 MG capsule Take 1 capsule by mouth 2 times daily for 30 days. . 60 capsule 0     No current facility-administered medications for this visit. Review of Systems:  Relevant review of systems reviewed and available in the patient's chart      Vital Signs: There were no vitals taken for this visit. General Exam:   Constitutional: Patient is adequately groomed with no evidence of malnutrition  DTRs: Deep tendon reflexes are intact  Mental Status: The patient is oriented to time, place and person. The patient's mood and affect are appropriate. Lymphatic: The lymphatic examination bilaterally reveals all areas to be without enlargement or induration. Vascular: Examination reveals no swelling or calf tenderness. Peripheral pulses are palpable and 2+. Neurological: The patient has good coordination. There is no weakness or sensory deficit. Right hip Examination:    Inspection:  There is a well-healed surgical incision without signs of infection. Leg length is appropriate. Palpation: There is tenderness to palpation over the lateral greater trochanteric region    Range of Motion:  Full active and passive range of motion of the hip without reproducible pain in the groin or lateral hip. Strength:  Strength with flexion and abduction is 5/5. Special Tests:  Negative straight leg raise against resistance. Skin: There are no rashes, ulcerations or lesions.     Gait: Normal gait pattern    Reflex normal deep tendon reflexes    Additional Comments: Additional Examinations:         Contralateral Exam: Examination of the left hip reveals intact skin. The patient demonstrates full painless range of motion with regards to flexion, abduction, internal and external rotation. There is no tenderness about the greater trochanter. There is a negative straight leg raise against resistance. Strength is 5/5 throughout all planes. Radiology:     X-rays obtained and reviewed in office:  Views 2 views including AP pelvis and lateral  Location right hip   Impression there is excellent alignment of the prosthesis with no evidence of any periprosthetic fractures or loosening. No polyethylene wear. Impression:  Encounter Diagnoses   Name Primary?  Hip pain, left Yes    Hip pain, right        Office Procedures:  Orders Placed This Encounter   Procedures    XR HIP RIGHT (2-3 VIEWS)     Standing Status:   Future     Number of Occurrences:   1     Standing Expiration Date:   6/26/2020       Treatment Plan: Patient is suffering from right lateral hip bursitis. Would avoid cortisone injections with the patient's hip replacement if possible. We will treated with oral NSAIDs and physical therapy. We will see him back in 4-6 weeks to assess his progress if he is not doing well.

## 2020-10-04 ENCOUNTER — HOSPITAL ENCOUNTER (OUTPATIENT)
Age: 85
Setting detail: OBSERVATION
Discharge: HOME | End: 2020-10-06
Attending: EMERGENCY MEDICINE | Admitting: INTERNAL MEDICINE
Payer: MEDICARE

## 2020-10-04 ENCOUNTER — APPOINTMENT (OUTPATIENT)
Dept: CT IMAGING | Age: 85
End: 2020-10-04
Payer: MEDICARE

## 2020-10-04 ENCOUNTER — APPOINTMENT (OUTPATIENT)
Dept: GENERAL RADIOLOGY | Age: 85
End: 2020-10-04
Payer: MEDICARE

## 2020-10-04 LAB
A/G RATIO: 1.7 (ref 1.1–2.2)
ABO/RH: NORMAL
ALBUMIN SERPL-MCNC: 3.8 G/DL (ref 3.4–5)
ALP BLD-CCNC: 60 U/L (ref 40–129)
ALT SERPL-CCNC: 17 U/L (ref 10–40)
ANION GAP SERPL CALCULATED.3IONS-SCNC: 13 MMOL/L (ref 3–16)
ANTIBODY SCREEN: NORMAL
APTT: 28.5 SEC (ref 24.2–36.2)
AST SERPL-CCNC: 24 U/L (ref 15–37)
BASOPHILS ABSOLUTE: 0.1 K/UL (ref 0–0.2)
BASOPHILS RELATIVE PERCENT: 0.9 %
BILIRUB SERPL-MCNC: 0.8 MG/DL (ref 0–1)
BUN BLDV-MCNC: 10 MG/DL (ref 7–20)
CALCIUM SERPL-MCNC: 8.8 MG/DL (ref 8.3–10.6)
CHLORIDE BLD-SCNC: 91 MMOL/L (ref 99–110)
CO2: 23 MMOL/L (ref 21–32)
CREAT SERPL-MCNC: 0.8 MG/DL (ref 0.8–1.3)
EOSINOPHILS ABSOLUTE: 0.1 K/UL (ref 0–0.6)
EOSINOPHILS RELATIVE PERCENT: 1.1 %
GFR AFRICAN AMERICAN: >60
GFR NON-AFRICAN AMERICAN: >60
GLOBULIN: 2.3 G/DL
GLUCOSE BLD-MCNC: 93 MG/DL (ref 70–99)
HCT VFR BLD CALC: 39.7 % (ref 40.5–52.5)
HEMOGLOBIN: 13.5 G/DL (ref 13.5–17.5)
INR BLD: 1.05 (ref 0.86–1.14)
LYMPHOCYTES ABSOLUTE: 1.4 K/UL (ref 1–5.1)
LYMPHOCYTES RELATIVE PERCENT: 17.2 %
MCH RBC QN AUTO: 31.2 PG (ref 26–34)
MCHC RBC AUTO-ENTMCNC: 33.9 G/DL (ref 31–36)
MCV RBC AUTO: 91.9 FL (ref 80–100)
MONOCYTES ABSOLUTE: 0.7 K/UL (ref 0–1.3)
MONOCYTES RELATIVE PERCENT: 9.2 %
NEUTROPHILS ABSOLUTE: 5.8 K/UL (ref 1.7–7.7)
NEUTROPHILS RELATIVE PERCENT: 71.6 %
PDW BLD-RTO: 12.6 % (ref 12.4–15.4)
PLATELET # BLD: 238 K/UL (ref 135–450)
PMV BLD AUTO: 7.7 FL (ref 5–10.5)
POTASSIUM SERPL-SCNC: 4.6 MMOL/L (ref 3.5–5.1)
PRO-BNP: 353 PG/ML (ref 0–449)
PROTHROMBIN TIME: 12.2 SEC (ref 10–13.2)
RBC # BLD: 4.32 M/UL (ref 4.2–5.9)
SODIUM BLD-SCNC: 127 MMOL/L (ref 136–145)
TOTAL PROTEIN: 6.1 G/DL (ref 6.4–8.2)
TROPONIN: <0.01 NG/ML
TROPONIN: <0.01 NG/ML
WBC # BLD: 8.1 K/UL (ref 4–11)

## 2020-10-04 PROCEDURE — 86850 RBC ANTIBODY SCREEN: CPT

## 2020-10-04 PROCEDURE — 86900 BLOOD TYPING SEROLOGIC ABO: CPT

## 2020-10-04 PROCEDURE — 80053 COMPREHEN METABOLIC PANEL: CPT

## 2020-10-04 PROCEDURE — 99285 EMERGENCY DEPT VISIT HI MDM: CPT

## 2020-10-04 PROCEDURE — 6360000004 HC RX CONTRAST MEDICATION: Performed by: EMERGENCY MEDICINE

## 2020-10-04 PROCEDURE — 74174 CTA ABD&PLVS W/CONTRAST: CPT

## 2020-10-04 PROCEDURE — 6370000000 HC RX 637 (ALT 250 FOR IP): Performed by: EMERGENCY MEDICINE

## 2020-10-04 PROCEDURE — 85610 PROTHROMBIN TIME: CPT

## 2020-10-04 PROCEDURE — 6360000002 HC RX W HCPCS: Performed by: EMERGENCY MEDICINE

## 2020-10-04 PROCEDURE — 71045 X-RAY EXAM CHEST 1 VIEW: CPT

## 2020-10-04 PROCEDURE — 86901 BLOOD TYPING SEROLOGIC RH(D): CPT

## 2020-10-04 PROCEDURE — 6370000000 HC RX 637 (ALT 250 FOR IP): Performed by: PHYSICIAN ASSISTANT

## 2020-10-04 PROCEDURE — 85730 THROMBOPLASTIN TIME PARTIAL: CPT

## 2020-10-04 PROCEDURE — 96375 TX/PRO/DX INJ NEW DRUG ADDON: CPT

## 2020-10-04 PROCEDURE — 83880 ASSAY OF NATRIURETIC PEPTIDE: CPT

## 2020-10-04 PROCEDURE — 85025 COMPLETE CBC W/AUTO DIFF WBC: CPT

## 2020-10-04 PROCEDURE — 6370000000 HC RX 637 (ALT 250 FOR IP): Performed by: NURSE PRACTITIONER

## 2020-10-04 PROCEDURE — G0378 HOSPITAL OBSERVATION PER HR: HCPCS

## 2020-10-04 PROCEDURE — 84484 ASSAY OF TROPONIN QUANT: CPT

## 2020-10-04 PROCEDURE — 93005 ELECTROCARDIOGRAM TRACING: CPT | Performed by: EMERGENCY MEDICINE

## 2020-10-04 PROCEDURE — 96374 THER/PROPH/DIAG INJ IV PUSH: CPT

## 2020-10-04 RX ORDER — POLYETHYLENE GLYCOL 3350 17 G/17G
17 POWDER, FOR SOLUTION ORAL DAILY PRN
Status: DISCONTINUED | OUTPATIENT
Start: 2020-10-04 | End: 2020-10-06 | Stop reason: HOSPADM

## 2020-10-04 RX ORDER — ACETAMINOPHEN 650 MG/1
650 SUPPOSITORY RECTAL EVERY 6 HOURS PRN
Status: DISCONTINUED | OUTPATIENT
Start: 2020-10-04 | End: 2020-10-06 | Stop reason: HOSPADM

## 2020-10-04 RX ORDER — NITROGLYCERIN 0.4 MG/1
0.4 TABLET SUBLINGUAL EVERY 5 MIN PRN
Status: DISCONTINUED | OUTPATIENT
Start: 2020-10-04 | End: 2020-10-04

## 2020-10-04 RX ORDER — ASPIRIN 81 MG/1
81 TABLET ORAL DAILY
Status: DISCONTINUED | OUTPATIENT
Start: 2020-10-05 | End: 2020-10-06 | Stop reason: HOSPADM

## 2020-10-04 RX ORDER — ONDANSETRON 2 MG/ML
4 INJECTION INTRAMUSCULAR; INTRAVENOUS EVERY 6 HOURS PRN
Status: DISCONTINUED | OUTPATIENT
Start: 2020-10-04 | End: 2020-10-06 | Stop reason: HOSPADM

## 2020-10-04 RX ORDER — TORSEMIDE 20 MG/1
10 TABLET ORAL DAILY
Status: DISCONTINUED | OUTPATIENT
Start: 2020-10-05 | End: 2020-10-06 | Stop reason: HOSPADM

## 2020-10-04 RX ORDER — ASPIRIN 81 MG/1
324 TABLET, CHEWABLE ORAL ONCE
Status: COMPLETED | OUTPATIENT
Start: 2020-10-04 | End: 2020-10-04

## 2020-10-04 RX ORDER — CHOLECALCIFEROL (VITAMIN D3) 125 MCG
5 CAPSULE ORAL NIGHTLY PRN
Status: DISCONTINUED | OUTPATIENT
Start: 2020-10-04 | End: 2020-10-06 | Stop reason: HOSPADM

## 2020-10-04 RX ORDER — METOPROLOL SUCCINATE 25 MG/1
25 TABLET, EXTENDED RELEASE ORAL DAILY
Status: DISCONTINUED | OUTPATIENT
Start: 2020-10-05 | End: 2020-10-05

## 2020-10-04 RX ORDER — SODIUM CHLORIDE 9 MG/ML
INJECTION, SOLUTION INTRAVENOUS CONTINUOUS
Status: DISCONTINUED | OUTPATIENT
Start: 2020-10-05 | End: 2020-10-05

## 2020-10-04 RX ORDER — ONDANSETRON 2 MG/ML
4 INJECTION INTRAMUSCULAR; INTRAVENOUS ONCE
Status: COMPLETED | OUTPATIENT
Start: 2020-10-04 | End: 2020-10-04

## 2020-10-04 RX ORDER — OXYCODONE HYDROCHLORIDE AND ACETAMINOPHEN 5; 325 MG/1; MG/1
1 TABLET ORAL 3 TIMES DAILY
Status: DISCONTINUED | OUTPATIENT
Start: 2020-10-05 | End: 2020-10-06 | Stop reason: HOSPADM

## 2020-10-04 RX ORDER — ATORVASTATIN CALCIUM 10 MG/1
20 TABLET, FILM COATED ORAL NIGHTLY
Status: DISCONTINUED | OUTPATIENT
Start: 2020-10-05 | End: 2020-10-06 | Stop reason: HOSPADM

## 2020-10-04 RX ORDER — NITROGLYCERIN 0.4 MG/1
0.4 TABLET SUBLINGUAL EVERY 5 MIN PRN
Status: DISCONTINUED | OUTPATIENT
Start: 2020-10-04 | End: 2020-10-06 | Stop reason: HOSPADM

## 2020-10-04 RX ORDER — PREGABALIN 75 MG/1
75 CAPSULE ORAL 2 TIMES DAILY
Status: DISCONTINUED | OUTPATIENT
Start: 2020-10-05 | End: 2020-10-06

## 2020-10-04 RX ORDER — SODIUM CHLORIDE 0.9 % (FLUSH) 0.9 %
10 SYRINGE (ML) INJECTION EVERY 12 HOURS SCHEDULED
Status: DISCONTINUED | OUTPATIENT
Start: 2020-10-05 | End: 2020-10-06 | Stop reason: HOSPADM

## 2020-10-04 RX ORDER — TAMSULOSIN HYDROCHLORIDE 0.4 MG/1
0.4 CAPSULE ORAL 2 TIMES DAILY
Status: DISCONTINUED | OUTPATIENT
Start: 2020-10-05 | End: 2020-10-06 | Stop reason: HOSPADM

## 2020-10-04 RX ORDER — ALBUTEROL SULFATE 90 UG/1
2 AEROSOL, METERED RESPIRATORY (INHALATION) EVERY 6 HOURS PRN
Status: DISCONTINUED | OUTPATIENT
Start: 2020-10-04 | End: 2020-10-05

## 2020-10-04 RX ORDER — FINASTERIDE 5 MG/1
5 TABLET, FILM COATED ORAL DAILY
Status: DISCONTINUED | OUTPATIENT
Start: 2020-10-05 | End: 2020-10-06 | Stop reason: HOSPADM

## 2020-10-04 RX ORDER — SODIUM CHLORIDE 0.9 % (FLUSH) 0.9 %
10 SYRINGE (ML) INJECTION PRN
Status: DISCONTINUED | OUTPATIENT
Start: 2020-10-04 | End: 2020-10-06 | Stop reason: HOSPADM

## 2020-10-04 RX ORDER — PROMETHAZINE HYDROCHLORIDE 25 MG/1
12.5 TABLET ORAL EVERY 6 HOURS PRN
Status: DISCONTINUED | OUTPATIENT
Start: 2020-10-04 | End: 2020-10-06 | Stop reason: HOSPADM

## 2020-10-04 RX ORDER — FENTANYL CITRATE 50 UG/ML
50 INJECTION, SOLUTION INTRAMUSCULAR; INTRAVENOUS
Status: DISCONTINUED | OUTPATIENT
Start: 2020-10-04 | End: 2020-10-06 | Stop reason: HOSPADM

## 2020-10-04 RX ORDER — ACETAMINOPHEN 325 MG/1
650 TABLET ORAL EVERY 6 HOURS PRN
Status: DISCONTINUED | OUTPATIENT
Start: 2020-10-04 | End: 2020-10-06 | Stop reason: HOSPADM

## 2020-10-04 RX ADMIN — FENTANYL CITRATE 50 MCG: 50 INJECTION INTRAMUSCULAR; INTRAVENOUS at 05:00

## 2020-10-04 RX ADMIN — FENTANYL CITRATE 50 MCG: 50 INJECTION INTRAMUSCULAR; INTRAVENOUS at 21:18

## 2020-10-04 RX ADMIN — ONDANSETRON 4 MG: 2 INJECTION INTRAMUSCULAR; INTRAVENOUS at 21:16

## 2020-10-04 RX ADMIN — IOPAMIDOL 75 ML: 755 INJECTION, SOLUTION INTRAVENOUS at 20:19

## 2020-10-04 RX ADMIN — NITROGLYCERIN 0.5 INCH: 20 OINTMENT TOPICAL at 21:16

## 2020-10-04 RX ADMIN — ASPIRIN 324 MG: 81 TABLET, CHEWABLE ORAL at 21:16

## 2020-10-04 ASSESSMENT — ENCOUNTER SYMPTOMS
NAUSEA: 0
COUGH: 0
SHORTNESS OF BREATH: 1
BACK PAIN: 1
ABDOMINAL PAIN: 0
EYE PAIN: 0
VOMITING: 0
SORE THROAT: 0

## 2020-10-04 ASSESSMENT — PAIN SCALES - GENERAL
PAINLEVEL_OUTOF10: 6
PAINLEVEL_OUTOF10: 6
PAINLEVEL_OUTOF10: 4

## 2020-10-04 NOTE — ED NOTES

## 2020-10-05 LAB
ANION GAP SERPL CALCULATED.3IONS-SCNC: 9 MMOL/L (ref 3–16)
BUN BLDV-MCNC: 8 MG/DL (ref 7–20)
CALCIUM SERPL-MCNC: 8.4 MG/DL (ref 8.3–10.6)
CHLORIDE BLD-SCNC: 95 MMOL/L (ref 99–110)
CHOLESTEROL, TOTAL: 110 MG/DL (ref 0–199)
CO2: 26 MMOL/L (ref 21–32)
CREAT SERPL-MCNC: 0.9 MG/DL (ref 0.8–1.3)
GFR AFRICAN AMERICAN: >60
GFR NON-AFRICAN AMERICAN: >60
GLUCOSE BLD-MCNC: 102 MG/DL (ref 70–99)
HCT VFR BLD CALC: 36.6 % (ref 40.5–52.5)
HDLC SERPL-MCNC: 38 MG/DL (ref 40–60)
HEMOGLOBIN: 12.2 G/DL (ref 13.5–17.5)
LDL CHOLESTEROL CALCULATED: 55 MG/DL
MCH RBC QN AUTO: 31 PG (ref 26–34)
MCHC RBC AUTO-ENTMCNC: 33.4 G/DL (ref 31–36)
MCV RBC AUTO: 92.8 FL (ref 80–100)
PDW BLD-RTO: 12.8 % (ref 12.4–15.4)
PLATELET # BLD: 227 K/UL (ref 135–450)
PMV BLD AUTO: 7.7 FL (ref 5–10.5)
POTASSIUM REFLEX MAGNESIUM: 3.8 MMOL/L (ref 3.5–5.1)
RBC # BLD: 3.94 M/UL (ref 4.2–5.9)
SODIUM BLD-SCNC: 130 MMOL/L (ref 136–145)
TRIGL SERPL-MCNC: 83 MG/DL (ref 0–150)
TROPONIN: <0.01 NG/ML
TROPONIN: <0.01 NG/ML
VLDLC SERPL CALC-MCNC: 17 MG/DL
WBC # BLD: 7.9 K/UL (ref 4–11)

## 2020-10-05 PROCEDURE — 6370000000 HC RX 637 (ALT 250 FOR IP)

## 2020-10-05 PROCEDURE — 6370000000 HC RX 637 (ALT 250 FOR IP): Performed by: INTERNAL MEDICINE

## 2020-10-05 PROCEDURE — 85027 COMPLETE CBC AUTOMATED: CPT

## 2020-10-05 PROCEDURE — 6360000002 HC RX W HCPCS: Performed by: NURSE PRACTITIONER

## 2020-10-05 PROCEDURE — 96372 THER/PROPH/DIAG INJ SC/IM: CPT

## 2020-10-05 PROCEDURE — 80061 LIPID PANEL: CPT

## 2020-10-05 PROCEDURE — 84484 ASSAY OF TROPONIN QUANT: CPT

## 2020-10-05 PROCEDURE — 6370000000 HC RX 637 (ALT 250 FOR IP): Performed by: NURSE PRACTITIONER

## 2020-10-05 PROCEDURE — 80048 BASIC METABOLIC PNL TOTAL CA: CPT

## 2020-10-05 PROCEDURE — 2580000003 HC RX 258: Performed by: NURSE PRACTITIONER

## 2020-10-05 PROCEDURE — 93005 ELECTROCARDIOGRAM TRACING: CPT | Performed by: EMERGENCY MEDICINE

## 2020-10-05 PROCEDURE — G0378 HOSPITAL OBSERVATION PER HR: HCPCS

## 2020-10-05 PROCEDURE — 96376 TX/PRO/DX INJ SAME DRUG ADON: CPT

## 2020-10-05 RX ORDER — DIPHENHYDRAMINE HCL 25 MG
50 TABLET ORAL NIGHTLY PRN
Status: DISCONTINUED | OUTPATIENT
Start: 2020-10-05 | End: 2020-10-06 | Stop reason: HOSPADM

## 2020-10-05 RX ORDER — ALBUTEROL SULFATE 90 UG/1
2 AEROSOL, METERED RESPIRATORY (INHALATION) EVERY 4 HOURS PRN
Status: DISCONTINUED | OUTPATIENT
Start: 2020-10-05 | End: 2020-10-06 | Stop reason: HOSPADM

## 2020-10-05 RX ORDER — DIPHENHYDRAMINE HCL 25 MG
50 TABLET ORAL NIGHTLY PRN
Status: DISCONTINUED | OUTPATIENT
Start: 2020-10-06 | End: 2020-10-05

## 2020-10-05 RX ORDER — METOPROLOL SUCCINATE 25 MG/1
25 TABLET, EXTENDED RELEASE ORAL DAILY
Status: DISCONTINUED | OUTPATIENT
Start: 2020-10-05 | End: 2020-10-06 | Stop reason: HOSPADM

## 2020-10-05 RX ORDER — METOPROLOL SUCCINATE 25 MG/1
25 TABLET, EXTENDED RELEASE ORAL DAILY
Status: DISCONTINUED | OUTPATIENT
Start: 2020-10-05 | End: 2020-10-05

## 2020-10-05 RX ADMIN — OXYCODONE HYDROCHLORIDE AND ACETAMINOPHEN 1 TABLET: 5; 325 TABLET ORAL at 22:02

## 2020-10-05 RX ADMIN — Medication 10 ML: at 22:03

## 2020-10-05 RX ADMIN — TAMSULOSIN HYDROCHLORIDE 0.4 MG: 0.4 CAPSULE ORAL at 22:03

## 2020-10-05 RX ADMIN — OXYCODONE HYDROCHLORIDE AND ACETAMINOPHEN 1 TABLET: 5; 325 TABLET ORAL at 00:28

## 2020-10-05 RX ADMIN — PROMETHAZINE HYDROCHLORIDE 12.5 MG: 25 TABLET ORAL at 16:11

## 2020-10-05 RX ADMIN — ATORVASTATIN CALCIUM 20 MG: 10 TABLET, FILM COATED ORAL at 00:28

## 2020-10-05 RX ADMIN — PROMETHAZINE HYDROCHLORIDE 12.5 MG: 25 TABLET ORAL at 05:54

## 2020-10-05 RX ADMIN — OXYCODONE HYDROCHLORIDE AND ACETAMINOPHEN 1 TABLET: 5; 325 TABLET ORAL at 16:30

## 2020-10-05 RX ADMIN — ENOXAPARIN SODIUM 40 MG: 40 INJECTION SUBCUTANEOUS at 11:04

## 2020-10-05 RX ADMIN — Medication: at 16:26

## 2020-10-05 RX ADMIN — ATORVASTATIN CALCIUM 20 MG: 10 TABLET, FILM COATED ORAL at 22:02

## 2020-10-05 RX ADMIN — ASPIRIN 81 MG: 81 TABLET ORAL at 11:03

## 2020-10-05 RX ADMIN — SODIUM CHLORIDE: 9 INJECTION, SOLUTION INTRAVENOUS at 00:28

## 2020-10-05 RX ADMIN — OXYCODONE HYDROCHLORIDE AND ACETAMINOPHEN 1 TABLET: 5; 325 TABLET ORAL at 11:03

## 2020-10-05 RX ADMIN — TAMSULOSIN HYDROCHLORIDE 0.4 MG: 0.4 CAPSULE ORAL at 00:28

## 2020-10-05 RX ADMIN — LEVOTHYROXINE SODIUM 75 MCG: 0.05 TABLET ORAL at 05:49

## 2020-10-05 RX ADMIN — FINASTERIDE 5 MG: 5 TABLET, FILM COATED ORAL at 11:02

## 2020-10-05 RX ADMIN — TAMSULOSIN HYDROCHLORIDE 0.4 MG: 0.4 CAPSULE ORAL at 11:03

## 2020-10-05 RX ADMIN — MELATONIN TAB 5 MG 5 MG: 5 TAB at 00:37

## 2020-10-05 RX ADMIN — METOPROLOL SUCCINATE 25 MG: 25 TABLET, EXTENDED RELEASE ORAL at 11:03

## 2020-10-05 RX ADMIN — TORSEMIDE 10 MG: 20 TABLET ORAL at 11:04

## 2020-10-05 ASSESSMENT — PAIN DESCRIPTION - LOCATION: LOCATION: ARM;HAND

## 2020-10-05 ASSESSMENT — PAIN SCALES - GENERAL
PAINLEVEL_OUTOF10: 1
PAINLEVEL_OUTOF10: 4
PAINLEVEL_OUTOF10: 7
PAINLEVEL_OUTOF10: 4
PAINLEVEL_OUTOF10: 4
PAINLEVEL_OUTOF10: 5

## 2020-10-05 ASSESSMENT — PAIN DESCRIPTION - ORIENTATION: ORIENTATION: RIGHT;LEFT

## 2020-10-05 ASSESSMENT — PAIN DESCRIPTION - PAIN TYPE: TYPE: ACUTE PAIN

## 2020-10-05 NOTE — ED NOTES
Called and spoke with patients daughter Bryan Decker per patient and Mary Dotson PA request. Updated on plan of care to admit patient. Patient given phone in room by Anh Ward so that daughter could call him if needed.       Richy Everett RN  10/04/20 [id number]

## 2020-10-05 NOTE — DISCHARGE SUMMARY
Hospital Medicine PROGRESS NOTE  Awaiting stress test    Patient ID: Natty Avila      Patient's PCP: Lawanda Mckeon MD    Admit Date: 10/4/2020     Discharge Date:   10/05/20     Admitting Physician: Alejandra Osborne MD     Discharge Physician: Kavita Mireles MD     Discharge Diagnoses: Active Hospital Problems    Diagnosis    Chest pain [R07.9]     Priority: High    CAD (coronary artery disease) [I25.10]    Obesity [E66.9]    Hypertension [I10]       The patient was seen and examined on day of discharge and this discharge summary is in conjunction with any daily progress note from day of discharge. Hospital Course:  82 y.o. male, with past medical history of hypertension, CAD, hyperlipidemia and obesity, who presented to [facility] with chest pain. Atypical chest pain  - EKG and troponin reassuring  - nuclear stress test is pending at the time of this note  - aspirin, statin, metoprolol  - I do not suspect PE or acute aortic pathology     HTN  - metoprolol    HLD  - statin    Obesity  With Body mass index is 86.3 kg/m². Complicating assessment and treatment. Placing patient at risk for multiple co-morbidities as well as early death and contributing to the patient's presentation. Counseled on weight loss            Physical Exam Performed:     /66   Pulse 72   Temp 97.8 °F (36.6 °C) (Oral)   Resp 16   Ht 5' 7\" (1.702 m)   Wt 230 lb (104.3 kg)   SpO2 95%   BMI 36.02 kg/m²       General appearance:  No apparent distress, appears stated age and cooperative. HEENT:  Normal cephalic, atraumatic without obvious deformity. Pupils equal, round, and reactive to light. Extra ocular muscles intact. Conjunctivae/corneas clear. Neck: Supple, with full range of motion. No jugular venous distention. Trachea midline. Respiratory:  Normal respiratory effort. Clear to auscultation, bilaterally without Rales/Wheezes/Rhonchi.   Cardiovascular:  Regular rate and rhythm with lumbar region, unspecified whether neurogenic claudication present; Lumbar facet arthropathy; Disc displacement, lumbar; Cervical stenosis of spinal canal; Osteoarthritis of cervical spine, unspecified spinal osteoarthritis complication status      naloxone 4 MG/0.1ML LIQD nasal spray 1 spray by Nasal route as needed for Opioid Reversal  Qty: 1 each, Refills: 0      diclofenac sodium (VOLTAREN) 1 % GEL APPLY 4 GRAMS TOPICALLY FOUR TIMES DAILY  Qty: 500 g, Refills: 5      Tens Unit MISC by Does not apply route  Qty: 1 each, Refills: 0    Associated Diagnoses: Cervical stenosis of spinal canal; Cervical radiculitis; Lumbar stenosis with neurogenic claudication      melatonin 5 MG TABS tablet Take 5 mg by mouth daily      torsemide (DEMADEX) 10 MG tablet TAKE 1 TAB BY MOUTH DAILY. Refills: 3      metoprolol succinate (TOPROL XL) 25 MG extended release tablet Take 1 tablet by mouth daily  Qty: 30 tablet, Refills: 1      pregabalin (LYRICA) 75 MG capsule Take 1 capsule by mouth 2 times daily for 30 days. Margeret Carson: 60 capsule, Refills: 0    Associated Diagnoses: Status post total replacement of left hip      peppermint oil liquid Take 1 capsule by mouth as needed (FOR STOMACH UPSET)       albuterol sulfate  (90 BASE) MCG/ACT inhaler Inhale 2 puffs into the lungs every 6 hours as needed for Wheezing      levothyroxine (SYNTHROID) 75 MCG tablet Take 75 mcg by mouth Daily      Vitamin D (CHOLECALCIFEROL) 1000 UNITS CAPS capsule Take 1,000 Units by mouth daily      aspirin 81 MG tablet Take 81 mg by mouth daily      atorvastatin (LIPITOR) 20 MG tablet Take 20 mg by mouth nightly. tamsulosin (FLOMAX) 0.4 MG capsule Take 0.4 mg by mouth 2 times daily. finasteride (PROSCAR) 5 MG tablet Take 5 mg by mouth daily. therapeutic multivitamin-minerals (THERAGRAN-M) tablet Take 1 tablet by mouth daily.                Time Spent on discharge is more than 30 minutes in the examination, evaluation, counseling and review

## 2020-10-05 NOTE — PLAN OF CARE
Problem: Pain:  Goal: Control of acute pain  Description: Control of acute pain  Outcome: Ongoing  Note: Patient alert and oriented x4. Rates pain using 0-10 scale. PRN pain medication given per MAR. Educated to call if pain increases/worsens. Patient verbalizes understanding. Call light within reach. Will continue to monitor. Problem: Falls - Risk of:  Goal: Will remain free from falls  Description: Will remain free from falls  Outcome: Ongoing  Note: Bed in lowest position, wheels locked. 2/4 side rails up, non skid socks on. Bed alarm in place and engaged. Educated to call when needing assistance. Pt verbalizes understanding. Call light within reach. Will continue to monitor.

## 2020-10-05 NOTE — ED PROVIDER NOTES
[address] [facility]  ED  EMERGENCY DEPARTMENT ENCOUNTER        Pt Name: Nancie Escamilla  MRN: [id number]  Miryamgfjl 1935  Date of evaluation: 10/4/2020  Provider: PABLO Cano  PCP: Dom Mcpherson MD     I have seen and evaluated this patient with my supervising physician Halina Law, [employer]       Chief Complaint   Patient presents with    Chest Pain     CP started mid afternoon, hx of sents substernal radiating to back,     Shortness of Breath       HISTORY OF PRESENT ILLNESS   (Location, Timing/Onset, Context/Setting, Quality, Duration, Modifying Factors, Severity, Associated Signs and Symptoms)  Note limiting factors. Nancie Escamilla is a 82 y.o. male who presents emergency room for chest pain. Patient reports that in the mid afternoon he began to experience pain between his shoulder blades which then radiate to his left upper back and then to his chest.  Notes a tingling sensation in his left leg. History of prior cardiac stents, is unsure of his most recent cardiac evaluation. Denies fever, abdominal pain, nausea vomiting, numbness, weakness, cough, abscess, calf swelling or pain. Nursing Notes were all reviewed and agreed with or any disagreements were addressed in the HPI. REVIEW OF SYSTEMS    (2-9 systems for level 4, 10 or more for level 5)     Review of Systems   Constitutional: Negative for fever. HENT: Negative for sore throat. Eyes: Negative for pain and visual disturbance. Respiratory: Positive for shortness of breath. Negative for cough. Cardiovascular: Positive for chest pain. Negative for palpitations and leg swelling. Gastrointestinal: Negative for abdominal pain, nausea and vomiting. Genitourinary: Negative for dysuria and frequency. Musculoskeletal: Positive for back pain. Negative for neck pain. Skin: Negative for rash. Neurological: Negative for weakness and headaches.    Psychiatric/Behavioral: METOPROLOL SUCCINATE (TOPROL XL) 25 MG EXTENDED RELEASE TABLET    Take 1 tablet by mouth daily    NALOXONE 4 MG/0.1ML LIQD NASAL SPRAY    1 spray by Nasal route as needed for Opioid Reversal    OXYCODONE-ACETAMINOPHEN (PERCOCET) 5-325 MG PER TABLET    Take 1 tablet by mouth 3 times daily for 30 days. PEPPERMINT OIL LIQUID    Take 1 capsule by mouth as needed (FOR STOMACH UPSET)     PREGABALIN (LYRICA) 75 MG CAPSULE    Take 1 capsule by mouth 2 times daily for 30 days. .    TAMSULOSIN (FLOMAX) 0.4 MG CAPSULE    Take 0.4 mg by mouth 2 times daily. TENS UNIT MISC    by Does not apply route    THERAPEUTIC MULTIVITAMIN-MINERALS (THERAGRAN-M) TABLET    Take 1 tablet by mouth daily. TORSEMIDE (DEMADEX) 10 MG TABLET    TAKE 1 TAB BY MOUTH DAILY. VITAMIN D (CHOLECALCIFEROL) 1000 UNITS CAPS CAPSULE    Take 1,000 Units by mouth daily         ALLERGIES     Erythromycin; Methocarbamol; Penicillins; Sulfa antibiotics; Tetanus toxoids; and Tetracyclines & related    FAMILYHISTORY       Family History   Problem Relation Age of Onset    Cancer Father         bladder          SOCIAL HISTORY       Social History     Tobacco Use    Smoking status: Former Smoker     Packs/day: 1.00     Years: 1.00     Pack years: 1.00     Types: Cigarettes     Last attempt to quit: 1960     Years since quittin.7    Smokeless tobacco: Never Used   Substance Use Topics    Alcohol use: Yes     Comment: nothing to drink x2 weeks, but previously drank 2-3 liquor daily    Drug use: No       SCREENINGS             PHYSICAL EXAM    (up to 7 for level 4, 8 or more for level 5)     ED Triage Vitals [10/04/20 1928]   BP Temp Temp Source Pulse Resp SpO2 Height Weight   (!) 152/84 98.9 °F (37.2 °C) Oral 76 18 98 % 5' 7\" (1.702 m) 230 lb (104.3 kg)       Physical Exam  Vitals signs reviewed. Constitutional:       Appearance: He is not diaphoretic. HENT:      Nose: No congestion or rhinorrhea. Eyes:      General: No scleral icterus. Conjunctiva/sclera: Conjunctivae normal.   Neck:      Musculoskeletal: Normal range of motion and neck supple. Cardiovascular:      Rate and Rhythm: Normal rate and regular rhythm. Pulses: Normal pulses. Heart sounds: Normal heart sounds. No murmur. No friction rub. No gallop. Pulmonary:      Effort: Pulmonary effort is normal. No respiratory distress. Breath sounds: Normal breath sounds. No stridor. No wheezing, rhonchi or rales. Abdominal:      General: There is no distension. Palpations: Abdomen is soft. Tenderness: There is no abdominal tenderness. There is no right CVA tenderness, left CVA tenderness, guarding or rebound. Musculoskeletal: Normal range of motion. General: No swelling or tenderness. Skin:     General: Skin is warm and dry. Capillary Refill: Capillary refill takes less than 2 seconds. Neurological:      General: No focal deficit present. Mental Status: He is alert and oriented to person, place, and time. Sensory: No sensory deficit. Motor: No weakness.    Psychiatric:         Mood and Affect: Mood normal.         Behavior: Behavior normal.         DIAGNOSTIC RESULTS   LABS:    Labs Reviewed   CBC WITH AUTO DIFFERENTIAL - Abnormal; Notable for the following components:       Result Value    Hematocrit 39.7 (*)     All other components within normal limits    Narrative:     Performed at:  [address],  [address] [place] [address], [address]   Phone [phone]   COMPREHENSIVE METABOLIC PANEL - Abnormal; Notable for the following components:    Sodium 127 (*)     Chloride 91 (*)     Total Protein 6.1 (*)     All other components within normal limits    Narrative:     Performed at:  [address],  [address] [place] [address], [place]   Phone [phone]   TROPONIN    Narrative:     Performed at:  [address],  [address] [place] [address], [place] [address]   Phone [phone]    Narrative:     Performed at:  [facility]) [address], [facility] [employer]   Phone [phone]   PROTIME-INR    Narrative:     Performed at:  [facility]) [address], [facility] [employer]   Phone [phone]   APTT    Narrative:     Performed at:  [address], [facility] [employer]   Phone [phone]   TROPONIN    Narrative:     Performed at:  [address], [facility] [employer]   Phone [phone]   TYPE AND SCREEN    Narrative:     Performed at:  [address], [facility] [employer]   Phone [phone]       All other labs were within normal range or not returned as of this dictation. EKG: All EKG's are interpreted by the Emergency Department Physician in the absence of a cardiologist.  Please see their note for interpretation of EKG. On my review of EKG patient's initial EKG, noted upright T-waves in V1 and V2 without ST changes consistent with STEMI. Repeat EKG performed with posterior leads with the V4, V5, V6 probes which did not show ST changes concerning for posterior wall STEMI. RADIOLOGY:   Non-plain film images such as CT, Ultrasound and MRI are read by the radiologist. Plain radiographic images are visualized and preliminarily interpreted by the ED Provider with the below findings:        Interpretation per the Radiologist below, if available at the time of this note:    CTA CHEST ABDOMEN PELVIS W CONTRAST   Preliminary Result   No acute abnormality in the chest, abdomen or pelvis. XR CHEST PORTABLE   Final Result   1. No acute abnormality.            Xr Chest Portable    Result Date: 10/4/2020  EXAMINATION: ONE XRAY VIEW OF THE CHEST 10/4/2020 7:58 pm COMPARISON: 08/17/2018 HISTORY: ORDERING SYSTEM PROVIDED HISTORY: cp TECHNOLOGIST PROVIDED HISTORY: Reason for exam:->cp Reason for Exam: cp; sob Acuity: Acute Type of Exam: Initial FINDINGS: There is left basilar scarring. The cardiac silhouette is within normal limits. There is no pneumothorax or pleural effusion. 1.  No acute abnormality. Cta Chest Abdomen Pelvis W Contrast    Result Date: 10/4/2020  EXAMINATION: CTA OF THE CHEST, ABDOMEN AND PELVIS WITH CONTRAST, 10/4/2020 7:52 pm TECHNIQUE: CTA of the chest, abdomen and pelvis was performed after the administration of intravenous contrast.  Multiplanar reformatted images are provided for review. MIP images are provided for review. Dose modulation, iterative reconstruction, and/or weight based adjustment of the mA/kV was utilized to reduce the radiation dose to as low as reasonably achievable. COMPARISON: None HISTORY: ORDERING SYSTEM PROVIDED HISTORY: Chest pain. Radiating from back, acute onset. Assess for aortic dissection. TECHNOLOGIST PROVIDED HISTORY: Reason for exam:->Chest pain. Radiating from back, acute onset. Assess for aortic dissection. Reason for Exam: Chest pain. Radiating from back, acute onset. Assess for aortic dissection. Acuity: Acute Type of Exam: Initial FINDINGS: Vascular: Heart: Normal size. No pericardial effusion. Pulmonary arteries: No large pulmonary embolus on nondedicated study. Thoracic aorta/arch vessels: No acute abnormality. Abdominal aorta: No acute abnormality. No aneurysm. Visceral vessels: Patent. Pelvic vessels: Patent. Chest: Mediastinum: There is no enlarged lymph node. Lungs/pleura:  Lungs are clear without focal consolidation or pulmonary edema. Soft Tissues/Bones: No acute abnormality. Abdomen/Pelvis: Organs: There is no acute abnormality of the liver, pancreas, spleen, adrenals, or kidneys. There is cholelithiasis without evidence of acute cholecystitis. GI/Bowel:  Bowel caliber is normal. There is no evidence of active bowel inflammation. There is no evidence of acute appendicitis. There is colonic diverticulosis without diverticulitis. Pelvis: No acute abnormality of the pelvic viscera. Peritoneum/Retroperitoneum: There is no free air or free fluid. Lymph nodes are not enlarged. Bones/Soft Tissues: No acute abnormality. Severe spinal canal stenosis at L3-4. Status post bilateral hip arthroplasties. No acute abnormality in the chest, abdomen or pelvis. PROCEDURES   Unless otherwise noted below, none     Procedures    CRITICAL CARE TIME   N/A    CONSULTS:  IP CONSULT TO HOSPITALIST      EMERGENCY DEPARTMENT COURSE and DIFFERENTIAL DIAGNOSIS/MDM:   Vitals:    Vitals:    10/04/20 1928 10/04/20 2123 10/04/20 2220   BP: (!) 152/84 (!) 160/120 (!) 160/81   Pulse: 76 76 75   Resp: 18 16 15   Temp: 98.9 °F (37.2 °C)     TempSrc: Oral     SpO2: 98% 99% 94%   Weight: 230 lb (104.3 kg)     Height: 5' 7\" (1.702 m)         Patient was given the following medications:  Medications   fentaNYL (SUBLIMAZE) injection 50 mcg (50 mcg Intravenous Given 10/4/20 2118)   iopamidol (ISOVUE-370) 76 % injection 75 mL (75 mLs Intravenous Given 10/4/20 2019)   aspirin chewable tablet 324 mg (324 mg Oral Given 10/4/20 2116)   ondansetron (ZOFRAN) injection 4 mg (4 mg Intravenous Given 10/4/20 2116)   nitroglycerin (NITRO-BID) 2 % ointment 0.5 inch (0.5 inches Topical Given 10/4/20 2116)           61-year-old male presents emergency department for chest pain. There is description of pain that radiate from his back to his chest with tingling of his left leg, I was concerned for the possibility of aortic dissection order a CTA of his chest abdomen pelvis, this did not show evidence of dissection or PE.  EKG shows upright T waves in V1 and V2 without ST changes consistent with STEMI, posterior lead EKG also does not show posterior wall STEMI. Initial troponin negative.   Due to patient's age, risk factors, description of his symptoms, I do believe

## 2020-10-05 NOTE — H&P
Hospital Medicine History & Physical      PCP: Sherren Or, MD    Date of Admission: 10/4/2020    Date of Service: Pt seen/examined on 10/4/2020 and Admitted to observation with expected LOS less than two midnights due to medical therapy. Chief Complaint: Chest pain    History Of Present Illness:      82 y.o. male, with past medical history of hypertension, CAD, hyperlipidemia and obesity, who presented to [facility] with pain. History obtained from the patient and review of EMR. Patient stated this afternoon while at rest he began to experience a sharp pain in his back between his shoulder blades. He stated the pain radiated through to his chest, down his arms and in his face and was associated with shortness of breath. The patient stated he also had a tingling sensation in both of his legs. He stated he does have a history of cardiac disease with stent placement and follows with Dr. Liliana Jones from cardiology. In the emergency room the patient had a negative troponin as well as a nonischemic EKG. A CTA chest abdomen pelvis was obtained that revealed no acute abnormality in the chest abdomen or pelvis. The patient will be admitted for further evaluation. He denied any other associated symptoms as well as any aggravating and/or alleviating factors. At the time of this assessment, the patient was resting comfortably in bed. He currently denies any chest pain, back pain, abdominal pain, shortness of breath, numbness, tingling, N/V/C/D, fever and/or chills.      Past Medical History:          Diagnosis Date    Arthritis     Asthma     BPH     CAD (coronary artery disease)     Chest pain     Hearing decreased     HEARING AIDS    Hyperlipidemia     Hypertension     Need for post exposure prophylaxis for rabies 1980    Thyroid disease     hypothyroidism    Wears glasses      Past Surgical History:          Procedure Laterality Date    BACK SURGERY      DISCECTOMY    BACK SURGERY 11/2017    lamenectomy, facetotomy, decompression l2-3, l3-4, l4-5    CATARACT REMOVAL Left 6/17/16    CATARACT REMOVAL WITH IMPLANT Right     COLONOSCOPY  2011    COLONOSCOPY  6/28/11    polyps    COLONOSCOPY  8/14    polyps    CORONARY ANGIOPLASTY WITH STENT PLACEMENT  0718/2011    CYST REMOVAL  1/3/2014    central back    FINGER SURGERY      GROWTH ON FIRST FINGER REMOVED    FRACTURE SURGERY  age 14    nose    HIP SURGERY Left 04/19/2017    LEFT LATERAL TOTAL HIP REPLACEMENT WITH CELLSAVER AND    JOINT REPLACEMENT Right 2006    TKR    JOINT REPLACEMENT Left 11/2013    TKR    JOINT REPLACEMENT Right 2008    THR    KNEE SURGERY      LUMBAR LAMINECTOMY N/A 01/06/2017    micro lumbar laminectomy L2-L3     Medications Prior to Admission:      Prior to Admission medications    Medication Sig Start Date End Date Taking? Authorizing Provider   oxyCODONE-acetaminophen (PERCOCET) 5-325 MG per tablet Take 1 tablet by mouth 3 times daily for 30 days. 9/10/20 10/10/20  Gladies Spurling, APRN - CNP   naloxone 4 MG/0.1ML LIQD nasal spray 1 spray by Nasal route as needed for Opioid Reversal 4/17/19   Gladies Spurling, APRN - CNP   diclofenac sodium (VOLTAREN) 1 % GEL APPLY 4 GRAMS TOPICALLY FOUR TIMES DAILY 1/29/19   Heriberto Weston MD   Tens Unit MISC by Does not apply route 10/10/18   Maida Thapa PA-C   melatonin 5 MG TABS tablet Take 5 mg by mouth daily    Historical Provider, MD   torsemide (DEMADEX) 10 MG tablet TAKE 1 TAB BY MOUTH DAILY. 6/11/18   Historical Provider, MD   metoprolol succinate (TOPROL XL) 25 MG extended release tablet Take 1 tablet by mouth daily 8/18/18   Chikis Mckenzie MD   pregabalin (LYRICA) 75 MG capsule Take 1 capsule by mouth 2 times daily for 30 days. . 8/3/18 9/4/19  Isa Love MD   peppermint oil liquid Take 1 capsule by mouth as needed (FOR STOMACH UPSET)     Historical Provider, MD   albuterol sulfate  (90 BASE) MCG/ACT inhaler Inhale 2 puffs into the lungs every 6 hours as needed for Wheezing    Historical Provider, MD   levothyroxine (SYNTHROID) 75 MCG tablet Take 75 mcg by mouth Daily    Historical Provider, MD   Vitamin D (CHOLECALCIFEROL) 1000 UNITS CAPS capsule Take 1,000 Units by mouth daily    Historical Provider, MD   aspirin 81 MG tablet Take 81 mg by mouth daily    Historical Provider, MD   atorvastatin (LIPITOR) 20 MG tablet Take 20 mg by mouth nightly. Historical Provider, MD   tamsulosin (FLOMAX) 0.4 MG capsule Take 0.4 mg by mouth 2 times daily. 1/28/11   Historical Provider, MD   finasteride (PROSCAR) 5 MG tablet Take 5 mg by mouth daily. Historical Provider, MD   therapeutic multivitamin-minerals (THERAGRAN-M) tablet Take 1 tablet by mouth daily. Historical Provider, MD     Allergies:  Erythromycin; Methocarbamol; Penicillins; Sulfa antibiotics; Tetanus toxoids; and Tetracyclines & related    Social History:      The patient currently lives at home    TOBACCO:   reports that he quit smoking about 60 years ago. His smoking use included cigarettes. He has a 1.00 pack-year smoking history. He has never used smokeless tobacco.  ETOH:   reports current alcohol use. E-Cigarettes Vaping or Juuling     Questions Responses    Vaping Use     Start Date     Does device contain nicotine? Quit Date     Vaping Type         Family History:      Reviewed in detail. Positive as follows:        Problem Relation Age of Onset    Cancer Father         bladder     REVIEW OF SYSTEMS:   Pertinent positives as noted in the HPI. All other systems reviewed and negative. PHYSICAL EXAM PERFORMED:    BP (!) 160/81   Pulse 75   Temp 98.9 °F (37.2 °C) (Oral)   Resp 15   Ht 5' 7\" (1.702 m)   Wt 230 lb (104.3 kg)   SpO2 94%   BMI 36.02 kg/m²     General appearance: Pleasant, elderly, obese male in no apparent distress, appears stated age and cooperative. HEENT:  Pupils equal, round, and reactive to light. Glasses extra ocular muscles intact. CHEST ABDOMEN PELVIS W CONTRAST   Preliminary Result   No acute abnormality in the chest, abdomen or pelvis. XR CHEST PORTABLE   Final Result   1. No acute abnormality. ASSESSMENT:    Active Hospital Problems    Diagnosis Date Noted    Chest pain [R07.9] 07/15/2011     Priority: High    CAD (coronary artery disease) [I25.10]     Obesity [E66.9]     Hypertension [I10]      PLAN:    Chest pain in setting of known CAD  -atypical  -serial troponin - initial negative  -EKG w/o ischemic change  -ASA given in ED  -nitro given in ED  -FLP in am  -NPO after MN  -stress echo in am  -asa daily  -prn nitro  -tele monitoring    Essential HTN  -continue metoprolol    HLD  -continue atorvastatin    Obesity  With Body mass index is 36.2 kg/m². Complicating assessment and treatment. Placing patient at risk for multiple co-morbidities as well as early death and contributing to the patient's presentation. Counseled on weight loss    DVT Prophylaxis: Lovenox    Diet: No diet orders on file     Code Status: Prior    PT/OT Eval Status: No indication for need at this time    Dispo - 1-2 days pending clinical improvement     Terry Cook, APRN - CNP    Thank you Scott Martínez MD for the opportunity to be involved in this patient's care.  If you have any questions or concerns please feel free to contact me at [phone].  ------------------------------Anticipated Dr. Jewel lopez-----------------------------------------------

## 2020-10-05 NOTE — CARE COORDINATION
CASE MANAGEMENT INITIAL with Discharge  ASSESSMENT    Reviewed chart and completed assessment with:Patient   Explained Case Management role/services. Yes    Primary contact information:Pham Whitehead- wife [phone]    Health Care Decision Maker: Reviewed   Who do you trust or have selected to make healthcare decisions for you? Name:   Yamel Lucio- wife   Phone  Number: [phone]  Can this person be reached and be able to respond quickly, such as within a few minutes or hours? Yes  Who would be your back-up decision maker? Name Karo Alvarez- dtr  Phone Number:[phone]       Admit date/status:10/4/2020 Observation  Diagnosis: Chest Pain   Is this a Readmission?:  No      Insurance:Medicare    Precert required for SNF: No       3 night stay required: Yes    Living arrangements, Adls, care needs, prior to admission: Reports living at home with wife, using cane for ambulation IPTA    Transportation:Deiens needs, wife and dtr are able to support      Durable Medical Equipment at home:  Walker__Cane_x_RTS__ BSC__Shower Chairx__  02__ HHN__ CPAP__  BiPap__  Hospital Bed__ W/C___ Other__________    Services in the home and/or outpatient, prior to admission:Reports prior outpatient at [address] [place] [address] for cardiac rehab    PT/OT recs:No recs noted     Barriers to discharge: Denies barriers     Plan/comments:Patient reports goal is to return home to the community with [employer] home cleaning and wife supports. Patient ambulates with cane. Patient reports having two steps to enter the home. Patient denies any further needs and no other recs at this time.        Discharge to: Home with wife or dtr to provide private transport     Confirmed discharge plan with: Patient: yes    LUCIA Muñoz

## 2020-10-05 NOTE — ED NOTES
[facility] @ 2122  RE: Admission for chest pain.  HEART score 6   Repaged @ 8086  Magali Lu, NP in the ED @ 2068       [facility]  10/04/20 8986

## 2020-10-05 NOTE — PROGRESS NOTES
RESPIRATORY THERAPY ASSESSMENT    Name:  Danyell Deshpande  Record Number:  [id number]  Age: 82 y.o. Gender: male  : 1935  Today's Date:  10/5/2020  Room:  9444/5336-42    Assessment     Is the patient being admitted for a COPD or Asthma exacerbation? No   (If yes the patient will be seen every 4 hours for the first 24 hours and then reassessed)    Patient Admission Diagnosis      Allergies  Allergies   Allergen Reactions    Erythromycin Hives    Methocarbamol Other (See Comments)     hypotension  hypotension      Penicillins Hives    Sulfa Antibiotics Hives    Tetanus Toxoids Hives    Tetracyclines & Related        Minimum Predicted Vital Capacity:     N/A          Actual Vital Capacity:      N/A              Pulmonary History:Asthma  Home Oxygen Therapy:  room air  Home Respiratory Therapy:Albuterol   Current Respiratory Therapy:  Albuterol mdi PRN          Respiratory Severity Index(RSI)   Patients with orders for inhalation medications, oxygen, or any therapeutic treatment modality will be placed on Respiratory Protocol. They will be assessed with the first treatment and at least every 72 hours thereafter. The following severity scale will be used to determine frequency of treatment intervention.     Smoking History: Smoking History Less than 1ppd or less than 15 pack year = 1    Social History  Social History     Tobacco Use    Smoking status: Former Smoker     Packs/day: 1.00     Years: 1.00     Pack years: 1.00     Types: Cigarettes     Last attempt to quit: 1960     Years since quittin.7    Smokeless tobacco: Never Used   Substance Use Topics    Alcohol use: Yes     Comment: nothing to drink x2 weeks, but previously drank 2-3 liquor daily    Drug use: No       Recent Surgical History: None = 0  Past Surgical History  Past Surgical History:   Procedure Laterality Date    BACK SURGERY      DISCECTOMY    BACK SURGERY  2017    lamenectomy, facetotomy, decompression l2-3, l3-4, l4-5    CATARACT REMOVAL Left 6/17/16    CATARACT REMOVAL WITH IMPLANT Right     COLONOSCOPY  2011    COLONOSCOPY  6/28/11    polyps    COLONOSCOPY  8/14    polyps    CORONARY ANGIOPLASTY WITH STENT PLACEMENT  0718/2011    CYST REMOVAL  1/3/2014    central back    FINGER SURGERY      GROWTH ON FIRST FINGER REMOVED    FRACTURE SURGERY  age 14    nose    HIP SURGERY Left 04/19/2017    LEFT LATERAL TOTAL HIP REPLACEMENT WITH CELLSAVER AND    JOINT REPLACEMENT Right 2006    TKR    JOINT REPLACEMENT Left 11/2013    TKR    JOINT REPLACEMENT Right 2008    THR    KNEE SURGERY      LUMBAR LAMINECTOMY N/A 01/06/2017    micro lumbar laminectomy L2-L3       Level of Consciousness: Alert, Follows Commands but Disoriented = 1    Level of Activity: Mostly sedentary, minimal walking = 2    Respiratory Pattern: Regular Pattern; RR 8-20 = 0    Breath Sounds: Diminshed bilaterally and/or crackles = 2    Sputum   ,  ,    Cough: Strong, spontaneous, non-productive = 0    Vital Signs   /76   Pulse 92   Temp 98 °F (36.7 °C) (Oral)   Resp 17   Ht 5' 7\" (1.702 m)   Wt 230 lb (104.3 kg)   SpO2 96%   BMI 36.02 kg/m²   SPO2 (COPD values may differ): Greater than or equal to 92% on room air = 0    Peak Flow (asthma only): not applicable = 0    RSI: 5-6 = Q4hr PRN (every four hours as needed) for dyspnea        Plan       Goals: medication delivery, mobilize retained secretions, volume expansion and improve oxygenation    Patient/caregiver was educated on the proper method of use for Respiratory Care Devices:  Yes      Level of patient/caregiver understanding able to:   ? Verbalize understanding   ? Demonstrate understanding       ? Teach back        ? Needs reinforcement       ? No available caregiver               ? Other:     Response to education:  Good     Is patient being placed on Home Treatment Regimen? Yes     Does the patient have everything they need prior to discharge?   NA     Comments: based on the patient's RSI, but not less than home treatment regimen frequency. 5. Bronchodilator(s) will be discontinued if patient has a RSI less than 9 and has received no scheduled or as needed treatment for 72  Hrs. Patients Ordered on a Mucolytic Agent:    1. Must always be administered with a bronchodilator. 2. Discontinue if patient experiences worsened bronchospasm, or secretions have lessened to the point that the patient is able to clear them with a cough. Anti-inflammatory and Combination Medications:    1. If the patient lacks prior history of lung disease, is not using inhaled anti-inflammatory medication at home, and lacks wheezing by examination or by history for at least 24 hours, contact physician for possible discontinuation.

## 2020-10-06 ENCOUNTER — APPOINTMENT (OUTPATIENT)
Dept: NUCLEAR MEDICINE | Age: 85
End: 2020-10-06
Payer: MEDICARE

## 2020-10-06 VITALS
OXYGEN SATURATION: 94 % | DIASTOLIC BLOOD PRESSURE: 59 MMHG | TEMPERATURE: 98 F | HEART RATE: 90 BPM | WEIGHT: 222.7 LBS | BODY MASS INDEX: 34.95 KG/M2 | HEIGHT: 67 IN | RESPIRATION RATE: 18 BRPM | SYSTOLIC BLOOD PRESSURE: 131 MMHG

## 2020-10-06 LAB
EKG ATRIAL RATE: 65 BPM
EKG ATRIAL RATE: 73 BPM
EKG ATRIAL RATE: 79 BPM
EKG DIAGNOSIS: NORMAL
EKG P AXIS: 18 DEGREES
EKG P AXIS: 65 DEGREES
EKG P AXIS: 80 DEGREES
EKG P-R INTERVAL: 212 MS
EKG P-R INTERVAL: 224 MS
EKG P-R INTERVAL: 238 MS
EKG Q-T INTERVAL: 380 MS
EKG Q-T INTERVAL: 380 MS
EKG Q-T INTERVAL: 414 MS
EKG QRS DURATION: 66 MS
EKG QRS DURATION: 68 MS
EKG QRS DURATION: 76 MS
EKG QTC CALCULATION (BAZETT): 418 MS
EKG QTC CALCULATION (BAZETT): 430 MS
EKG QTC CALCULATION (BAZETT): 435 MS
EKG R AXIS: -11 DEGREES
EKG R AXIS: -4 DEGREES
EKG R AXIS: 4 DEGREES
EKG T AXIS: 21 DEGREES
EKG T AXIS: 23 DEGREES
EKG T AXIS: 7 DEGREES
EKG VENTRICULAR RATE: 65 BPM
EKG VENTRICULAR RATE: 73 BPM
EKG VENTRICULAR RATE: 79 BPM
LV EF: 57 %
LVEF MODALITY: NORMAL

## 2020-10-06 PROCEDURE — 96372 THER/PROPH/DIAG INJ SC/IM: CPT

## 2020-10-06 PROCEDURE — 93010 ELECTROCARDIOGRAM REPORT: CPT | Performed by: INTERNAL MEDICINE

## 2020-10-06 PROCEDURE — 6370000000 HC RX 637 (ALT 250 FOR IP): Performed by: NURSE PRACTITIONER

## 2020-10-06 PROCEDURE — 78452 HT MUSCLE IMAGE SPECT MULT: CPT

## 2020-10-06 PROCEDURE — 2580000003 HC RX 258: Performed by: INTERNAL MEDICINE

## 2020-10-06 PROCEDURE — 3430000000 HC RX DIAGNOSTIC RADIOPHARMACEUTICAL: Performed by: INTERNAL MEDICINE

## 2020-10-06 PROCEDURE — A9502 TC99M TETROFOSMIN: HCPCS | Performed by: INTERNAL MEDICINE

## 2020-10-06 PROCEDURE — 6360000002 HC RX W HCPCS: Performed by: INTERNAL MEDICINE

## 2020-10-06 PROCEDURE — G0378 HOSPITAL OBSERVATION PER HR: HCPCS

## 2020-10-06 PROCEDURE — 93017 CV STRESS TEST TRACING ONLY: CPT

## 2020-10-06 PROCEDURE — 6360000002 HC RX W HCPCS: Performed by: NURSE PRACTITIONER

## 2020-10-06 PROCEDURE — 6370000000 HC RX 637 (ALT 250 FOR IP): Performed by: INTERNAL MEDICINE

## 2020-10-06 RX ORDER — SODIUM CHLORIDE 9 MG/ML
INJECTION, SOLUTION INTRAVENOUS CONTINUOUS
Status: DISCONTINUED | OUTPATIENT
Start: 2020-10-06 | End: 2020-10-06 | Stop reason: HOSPADM

## 2020-10-06 RX ADMIN — REGADENOSON 0.4 MG: 0.08 INJECTION, SOLUTION INTRAVENOUS at 09:12

## 2020-10-06 RX ADMIN — ACETAMINOPHEN 650 MG: 325 TABLET ORAL at 06:03

## 2020-10-06 RX ADMIN — TAMSULOSIN HYDROCHLORIDE 0.4 MG: 0.4 CAPSULE ORAL at 10:57

## 2020-10-06 RX ADMIN — METOPROLOL SUCCINATE 25 MG: 25 TABLET, EXTENDED RELEASE ORAL at 10:57

## 2020-10-06 RX ADMIN — TORSEMIDE 10 MG: 20 TABLET ORAL at 10:57

## 2020-10-06 RX ADMIN — DIPHENHYDRAMINE HCL 50 MG: 25 TABLET ORAL at 02:10

## 2020-10-06 RX ADMIN — TETROFOSMIN 10.9 MILLICURIE: 1.38 INJECTION, POWDER, LYOPHILIZED, FOR SOLUTION INTRAVENOUS at 07:55

## 2020-10-06 RX ADMIN — SODIUM CHLORIDE: 9 INJECTION, SOLUTION INTRAVENOUS at 06:42

## 2020-10-06 RX ADMIN — OXYCODONE HYDROCHLORIDE AND ACETAMINOPHEN 1 TABLET: 5; 325 TABLET ORAL at 09:24

## 2020-10-06 RX ADMIN — LEVOTHYROXINE SODIUM 75 MCG: 0.05 TABLET ORAL at 06:02

## 2020-10-06 RX ADMIN — FINASTERIDE 5 MG: 5 TABLET, FILM COATED ORAL at 10:57

## 2020-10-06 RX ADMIN — TETROFOSMIN 33.2 MILLICURIE: 1.38 INJECTION, POWDER, LYOPHILIZED, FOR SOLUTION INTRAVENOUS at 09:12

## 2020-10-06 RX ADMIN — ENOXAPARIN SODIUM 40 MG: 40 INJECTION SUBCUTANEOUS at 10:57

## 2020-10-06 RX ADMIN — ASPIRIN 81 MG: 81 TABLET ORAL at 10:57

## 2020-10-06 ASSESSMENT — PAIN SCALES - GENERAL
PAINLEVEL_OUTOF10: 5
PAINLEVEL_OUTOF10: 5

## 2020-10-06 NOTE — DISCHARGE SUMMARY
Hospital Medicine Discharge Summary    Patient ID: Pietro Sharpe      Patient's PCP: Sarah Shah MD    Admit Date: 10/4/2020     Discharge Date:   10/06/20     Admitting Physician: Moira Segundo MD     Discharge Physician: Danie Hamman, MD     Discharge Diagnoses: Active Hospital Problems    Diagnosis    Chest pain [R07.9]     Priority: High    CAD (coronary artery disease) [I25.10]    Obesity [E66.9]    Hypertension [I10]       The patient was seen and examined on day of discharge and this discharge summary is in conjunction with any daily progress note from day of discharge. Hospital Course:  82 y.o. male, with past medical history of hypertension, CAD, hyperlipidemia and obesity, who presented to [facility] with chest pain. Atypical chest pain  - EKG and troponin reassuring  - nuclear stress test is pending at the time of this note  - aspirin, statin, metoprolol  - I do not suspect PE or acute aortic pathology     HTN  - metoprolol    HLD  - statin    Obesity  With Body mass index is 58.1 kg/m². Complicating assessment and treatment. Placing patient at risk for multiple co-morbidities as well as early death and contributing to the patient's presentation. Counseled on weight loss            Physical Exam Performed:     BP (!) 130/56   Pulse 86   Temp 97.6 °F (36.4 °C)   Resp 26   Ht 5' 7\" (1.702 m)   Wt 222 lb 11.2 oz (101 kg)   SpO2 97%   BMI 34.88 kg/m²       General appearance:  No apparent distress, appears stated age and cooperative. HEENT:  Normal cephalic, atraumatic without obvious deformity. Pupils equal, round, and reactive to light. Extra ocular muscles intact. Conjunctivae/corneas clear. Neck: Supple, with full range of motion. No jugular venous distention. Trachea midline. Respiratory:  Normal respiratory effort. Clear to auscultation, bilaterally without Rales/Wheezes/Rhonchi.   Cardiovascular:  Regular rate and rhythm with normal S1/S2 without murmurs, rubs or gallops. Abdomen: Soft, non-tender, non-distended with normal bowel sounds. Musculoskeletal:  No clubbing, cyanosis or edema bilaterally. Full range of motion without deformity. Skin: Skin color, texture, turgor normal.  No rashes or lesions. Neurologic:  Neurovascularly intact without any focal sensory/motor deficits. Cranial nerves: II-XII intact, grossly non-focal.  Psychiatric:  Alert and oriented, thought content appropriate, normal insight  Capillary Refill: Brisk,< 3 seconds   Peripheral Pulses: +2 palpable, equal bilaterally       Labs: For convenience and continuity at follow-up the following most recent labs are provided:      CBC:    Lab Results   Component Value Date    WBC 7.9 10/05/2020    HGB 12.2 10/05/2020    HCT 36.6 10/05/2020     10/05/2020       Renal:    Lab Results   Component Value Date     10/05/2020    K 3.8 10/05/2020    CL 95 10/05/2020    CO2 26 10/05/2020    BUN 8 10/05/2020    CREATININE 0.9 10/05/2020    CALCIUM 8.4 10/05/2020    PHOS 2.8 08/18/2018         Significant Diagnostic Studies    Radiology:   CTA CHEST ABDOMEN PELVIS W CONTRAST   Final Result   No acute abnormality in the chest, abdomen or pelvis. XR CHEST PORTABLE   Final Result   1. No acute abnormality. NM Cardiac Stress Test Nuclear Imaging    (Results Pending)          Consults:     IP CONSULT TO HOSPITALIST    Disposition:  home     Condition at Discharge: Stable    Discharge Instructions/Follow-up:  Follow up with PCP within 1-2 weeks. Code Status:  Full Code     Activity: activity as tolerated    Diet: cardiac diet      Discharge Medications:     Current Discharge Medication List           Details   oxyCODONE-acetaminophen (PERCOCET) 5-325 MG per tablet Take 1 tablet by mouth 3 times daily for 30 days.   Qty: 90 tablet, Refills: 0    Comments: Reduce doses taken as pain becomes manageable  Associated Diagnoses: Spinal stenosis of lumbar region, unspecified whether neurogenic claudication present; Lumbar facet arthropathy; Disc displacement, lumbar; Cervical stenosis of spinal canal; Osteoarthritis of cervical spine, unspecified spinal osteoarthritis complication status      naloxone 4 MG/0.1ML LIQD nasal spray 1 spray by Nasal route as needed for Opioid Reversal  Qty: 1 each, Refills: 0      diclofenac sodium (VOLTAREN) 1 % GEL APPLY 4 GRAMS TOPICALLY FOUR TIMES DAILY  Qty: 500 g, Refills: 5      Tens Unit MISC by Does not apply route  Qty: 1 each, Refills: 0    Associated Diagnoses: Cervical stenosis of spinal canal; Cervical radiculitis; Lumbar stenosis with neurogenic claudication      melatonin 5 MG TABS tablet Take 5 mg by mouth daily      torsemide (DEMADEX) 10 MG tablet TAKE 1 TAB BY MOUTH DAILY. Refills: 3      metoprolol succinate (TOPROL XL) 25 MG extended release tablet Take 1 tablet by mouth daily  Qty: 30 tablet, Refills: 1      peppermint oil liquid Take 1 capsule by mouth as needed (FOR STOMACH UPSET)       albuterol sulfate  (90 BASE) MCG/ACT inhaler Inhale 2 puffs into the lungs every 6 hours as needed for Wheezing      levothyroxine (SYNTHROID) 75 MCG tablet Take 75 mcg by mouth Daily      Vitamin D (CHOLECALCIFEROL) 1000 UNITS CAPS capsule Take 1,000 Units by mouth daily      aspirin 81 MG tablet Take 81 mg by mouth daily      atorvastatin (LIPITOR) 20 MG tablet Take 20 mg by mouth nightly. tamsulosin (FLOMAX) 0.4 MG capsule Take 0.4 mg by mouth 2 times daily. finasteride (PROSCAR) 5 MG tablet Take 5 mg by mouth daily. therapeutic multivitamin-minerals (THERAGRAN-M) tablet Take 1 tablet by mouth daily. Time Spent on discharge is more than 30 minutes in the examination, evaluation, counseling and review of medications and discharge plan. Signed:    Donny Mayberry MD   10/6/2020      Thank you Svetlana Luna MD for the opportunity to be involved in this patient's care.  If you have any questions or concerns please feel free to contact me at [phone].

## 2020-10-06 NOTE — PROGRESS NOTES
Pt d/c home w/ no prescriptions, instructed to follow up w/ primary care physician. Pt wheeled out to personal transportation e/ no questions/concerns. Will continue to monitor.

## 2020-10-06 NOTE — PROGRESS NOTES
A Amelie Myoview stress test was completed on this patient as ordered. The patient tolerated the procedure well. Awaiting stress imaging at this time.

## 2020-10-06 NOTE — CARE COORDINATION
CASE MANAGEMENT DISCHARGE SUMMARY      Discharge to: Home     Transportation: via family transport      Confirmed discharge plan with: Patient in room. Family will transport home after stress test results come back. Patient living at home IPTA. Ct to deny dc needs.       RN, name: Mitesh Galeana

## 2021-02-04 ENCOUNTER — APPOINTMENT (OUTPATIENT)
Dept: GENERAL RADIOLOGY | Age: 86
End: 2021-02-04
Payer: MEDICARE

## 2021-02-04 ENCOUNTER — APPOINTMENT (OUTPATIENT)
Dept: CT IMAGING | Age: 86
End: 2021-02-04
Payer: MEDICARE

## 2021-02-04 ENCOUNTER — HOSPITAL ENCOUNTER (EMERGENCY)
Age: 86
Discharge: ANOTHER HOSPITAL | End: 2021-02-04
Attending: EMERGENCY MEDICINE
Payer: MEDICARE

## 2021-02-04 VITALS
HEART RATE: 105 BPM | WEIGHT: 199 LBS | OXYGEN SATURATION: 96 % | RESPIRATION RATE: 20 BRPM | BODY MASS INDEX: 31.23 KG/M2 | HEIGHT: 67 IN | TEMPERATURE: 98.6 F | DIASTOLIC BLOOD PRESSURE: 69 MMHG | SYSTOLIC BLOOD PRESSURE: 107 MMHG

## 2021-02-04 DIAGNOSIS — N17.9 AKI (ACUTE KIDNEY INJURY) (HCC): ICD-10-CM

## 2021-02-04 DIAGNOSIS — N99.89 OTHER POSTOPERATIVE COMPLICATION INVOLVING GENITOURINARY SYSTEM: ICD-10-CM

## 2021-02-04 DIAGNOSIS — N28.89 PARAURETERIC URINOMA: Primary | ICD-10-CM

## 2021-02-04 DIAGNOSIS — Z90.79 S/P TURP (STATUS POST TRANSURETHRAL RESECTION OF PROSTATE): ICD-10-CM

## 2021-02-04 LAB
A/G RATIO: 1.2 (ref 1.1–2.2)
ALBUMIN SERPL-MCNC: 3 G/DL (ref 3.4–5)
ALP BLD-CCNC: 86 U/L (ref 40–129)
ALT SERPL-CCNC: 18 U/L (ref 10–40)
ANION GAP SERPL CALCULATED.3IONS-SCNC: 15 MMOL/L (ref 3–16)
AST SERPL-CCNC: 41 U/L (ref 15–37)
BACTERIA: ABNORMAL /HPF
BASOPHILS ABSOLUTE: 0 K/UL (ref 0–0.2)
BASOPHILS RELATIVE PERCENT: 0.1 %
BILIRUB SERPL-MCNC: 0.9 MG/DL (ref 0–1)
BILIRUBIN URINE: NEGATIVE
BLOOD, URINE: ABNORMAL
BUN BLDV-MCNC: 28 MG/DL (ref 7–20)
CALCIUM SERPL-MCNC: 7.4 MG/DL (ref 8.3–10.6)
CHLORIDE BLD-SCNC: 103 MMOL/L (ref 99–110)
CLARITY: ABNORMAL
CO2: 13 MMOL/L (ref 21–32)
COLOR: YELLOW
CREAT SERPL-MCNC: 2.3 MG/DL (ref 0.8–1.3)
EKG ATRIAL RATE: 103 BPM
EKG DIAGNOSIS: NORMAL
EKG P AXIS: 81 DEGREES
EKG P-R INTERVAL: 184 MS
EKG Q-T INTERVAL: 336 MS
EKG QRS DURATION: 74 MS
EKG QTC CALCULATION (BAZETT): 440 MS
EKG R AXIS: -12 DEGREES
EKG T AXIS: 4 DEGREES
EKG VENTRICULAR RATE: 103 BPM
EOSINOPHILS ABSOLUTE: 0 K/UL (ref 0–0.6)
EOSINOPHILS RELATIVE PERCENT: 0.2 %
EPITHELIAL CELLS, UA: ABNORMAL /HPF (ref 0–5)
GFR AFRICAN AMERICAN: 33
GFR NON-AFRICAN AMERICAN: 27
GLOBULIN: 2.5 G/DL
GLUCOSE BLD-MCNC: 73 MG/DL (ref 70–99)
GLUCOSE URINE: NEGATIVE MG/DL
HCT VFR BLD CALC: 36 % (ref 40.5–52.5)
HEMOGLOBIN: 12 G/DL (ref 13.5–17.5)
KETONES, URINE: 15 MG/DL
LACTIC ACID: 0.7 MMOL/L (ref 0.4–2)
LEUKOCYTE ESTERASE, URINE: NEGATIVE
LYMPHOCYTES ABSOLUTE: 0.7 K/UL (ref 1–5.1)
LYMPHOCYTES RELATIVE PERCENT: 5.1 %
MCH RBC QN AUTO: 31 PG (ref 26–34)
MCHC RBC AUTO-ENTMCNC: 33.3 G/DL (ref 31–36)
MCV RBC AUTO: 93.3 FL (ref 80–100)
MICROSCOPIC EXAMINATION: YES
MONOCYTES ABSOLUTE: 1.5 K/UL (ref 0–1.3)
MONOCYTES RELATIVE PERCENT: 10.4 %
NEUTROPHILS ABSOLUTE: 11.8 K/UL (ref 1.7–7.7)
NEUTROPHILS RELATIVE PERCENT: 84.2 %
NITRITE, URINE: NEGATIVE
PDW BLD-RTO: 13.5 % (ref 12.4–15.4)
PH UA: 5.5 (ref 5–8)
PLATELET # BLD: 361 K/UL (ref 135–450)
PMV BLD AUTO: 7.3 FL (ref 5–10.5)
POTASSIUM SERPL-SCNC: 4.4 MMOL/L (ref 3.5–5.1)
PROCALCITONIN: 0.26 NG/ML (ref 0–0.15)
PROTEIN UA: 30 MG/DL
RBC # BLD: 3.86 M/UL (ref 4.2–5.9)
RBC UA: >100 /HPF (ref 0–4)
SARS-COV-2, NAAT: NOT DETECTED
SODIUM BLD-SCNC: 131 MMOL/L (ref 136–145)
SPECIFIC GRAVITY UA: >=1.03 (ref 1–1.03)
SPECIMEN STATUS: NORMAL
TOTAL PROTEIN: 5.5 G/DL (ref 6.4–8.2)
URINE REFLEX TO CULTURE: ABNORMAL
URINE TYPE: ABNORMAL
UROBILINOGEN, URINE: 0.2 E.U./DL
WBC # BLD: 14 K/UL (ref 4–11)
WBC UA: ABNORMAL /HPF (ref 0–5)

## 2021-02-04 PROCEDURE — 73030 X-RAY EXAM OF SHOULDER: CPT

## 2021-02-04 PROCEDURE — 87040 BLOOD CULTURE FOR BACTERIA: CPT

## 2021-02-04 PROCEDURE — 83605 ASSAY OF LACTIC ACID: CPT

## 2021-02-04 PROCEDURE — 84145 PROCALCITONIN (PCT): CPT

## 2021-02-04 PROCEDURE — 93010 ELECTROCARDIOGRAM REPORT: CPT | Performed by: INTERNAL MEDICINE

## 2021-02-04 PROCEDURE — 2580000003 HC RX 258: Performed by: PHYSICIAN ASSISTANT

## 2021-02-04 PROCEDURE — 93005 ELECTROCARDIOGRAM TRACING: CPT | Performed by: EMERGENCY MEDICINE

## 2021-02-04 PROCEDURE — 87086 URINE CULTURE/COLONY COUNT: CPT

## 2021-02-04 PROCEDURE — 74176 CT ABD & PELVIS W/O CONTRAST: CPT

## 2021-02-04 PROCEDURE — 6370000000 HC RX 637 (ALT 250 FOR IP)

## 2021-02-04 PROCEDURE — 81001 URINALYSIS AUTO W/SCOPE: CPT

## 2021-02-04 PROCEDURE — 51798 US URINE CAPACITY MEASURE: CPT

## 2021-02-04 PROCEDURE — 6360000002 HC RX W HCPCS: Performed by: PHYSICIAN ASSISTANT

## 2021-02-04 PROCEDURE — 96365 THER/PROPH/DIAG IV INF INIT: CPT

## 2021-02-04 PROCEDURE — 71045 X-RAY EXAM CHEST 1 VIEW: CPT

## 2021-02-04 PROCEDURE — 99285 EMERGENCY DEPT VISIT HI MDM: CPT

## 2021-02-04 PROCEDURE — 94640 AIRWAY INHALATION TREATMENT: CPT

## 2021-02-04 PROCEDURE — 72125 CT NECK SPINE W/O DYE: CPT

## 2021-02-04 PROCEDURE — 73090 X-RAY EXAM OF FOREARM: CPT

## 2021-02-04 PROCEDURE — 85025 COMPLETE CBC W/AUTO DIFF WBC: CPT

## 2021-02-04 PROCEDURE — 70450 CT HEAD/BRAIN W/O DYE: CPT

## 2021-02-04 PROCEDURE — 80053 COMPREHEN METABOLIC PANEL: CPT

## 2021-02-04 PROCEDURE — U0002 COVID-19 LAB TEST NON-CDC: HCPCS

## 2021-02-04 RX ORDER — 0.9 % SODIUM CHLORIDE 0.9 %
500 INTRAVENOUS SOLUTION INTRAVENOUS ONCE
Status: COMPLETED | OUTPATIENT
Start: 2021-02-04 | End: 2021-02-04

## 2021-02-04 RX ORDER — ALBUTEROL SULFATE 2.5 MG/3ML
2.5 SOLUTION RESPIRATORY (INHALATION) ONCE
Status: COMPLETED | OUTPATIENT
Start: 2021-02-04 | End: 2021-02-04

## 2021-02-04 RX ORDER — SODIUM CHLORIDE 9 MG/ML
INJECTION, SOLUTION INTRAVENOUS CONTINUOUS
Status: DISCONTINUED | OUTPATIENT
Start: 2021-02-04 | End: 2021-02-04

## 2021-02-04 RX ORDER — LIDOCAINE HYDROCHLORIDE 20 MG/ML
JELLY TOPICAL
Status: COMPLETED
Start: 2021-02-04 | End: 2021-02-04

## 2021-02-04 RX ADMIN — ALBUTEROL SULFATE 2.5 MG: 2.5 SOLUTION RESPIRATORY (INHALATION) at 12:19

## 2021-02-04 RX ADMIN — LIDOCAINE HYDROCHLORIDE: 20 JELLY TOPICAL at 15:22

## 2021-02-04 RX ADMIN — CEFTRIAXONE SODIUM 1000 MG: 1 INJECTION, POWDER, FOR SOLUTION INTRAMUSCULAR; INTRAVENOUS at 15:06

## 2021-02-04 RX ADMIN — SODIUM CHLORIDE 500 ML: 9 INJECTION, SOLUTION INTRAVENOUS at 15:06

## 2021-02-04 ASSESSMENT — ENCOUNTER SYMPTOMS: TACHYPNEA: 1

## 2021-02-04 NOTE — ED PROVIDER NOTES
Positives and Pertinent negatives as per HPI. Except as noted above in the ROS, all other systems were reviewed and negative. PAST MEDICAL HISTORY     Past Medical History:   Diagnosis Date    Arthritis     Asthma     BPH     CAD (coronary artery disease)     Chest pain     Hearing decreased     HEARING AIDS    Hyperlipidemia     Hypertension     Need for post exposure prophylaxis for rabies 1980    Thyroid disease     hypothyroidism    Wears glasses          SURGICAL HISTORY     Past Surgical History:   Procedure Laterality Date    BACK SURGERY      DISCECTOMY    BACK SURGERY  11/2017    lamenectomy, facetotomy, decompression l2-3, l3-4, l4-5    CATARACT REMOVAL Left 6/17/16    CATARACT REMOVAL WITH IMPLANT Right     COLONOSCOPY  2011    COLONOSCOPY  6/28/11    polyps    COLONOSCOPY  8/14    polyps    CORONARY ANGIOPLASTY WITH STENT PLACEMENT  0718/2011    CYST REMOVAL  1/3/2014    central back    FINGER SURGERY      GROWTH ON FIRST FINGER REMOVED    FRACTURE SURGERY  age 14    nose    HIP SURGERY Left 04/19/2017    LEFT LATERAL TOTAL HIP REPLACEMENT WITH CELLSAVER AND    JOINT REPLACEMENT Right 2006    TKR    JOINT REPLACEMENT Left 11/2013    TKR    JOINT REPLACEMENT Right 2008    THR    KNEE SURGERY      LUMBAR LAMINECTOMY N/A 01/06/2017    micro lumbar laminectomy L2-L3         CURRENTMEDICATIONS       Previous Medications    ALBUTEROL SULFATE  (90 BASE) MCG/ACT INHALER    Inhale 2 puffs into the lungs every 6 hours as needed for Wheezing    ASPIRIN 81 MG TABLET    Take 81 mg by mouth daily    ATORVASTATIN (LIPITOR) 20 MG TABLET    Take 20 mg by mouth nightly. DICLOFENAC SODIUM (VOLTAREN) 1 % GEL    APPLY 4 GRAMS TOPICALLY FOUR TIMES DAILY    FINASTERIDE (PROSCAR) 5 MG TABLET    Take 5 mg by mouth daily.     LEVOTHYROXINE (SYNTHROID) 75 MCG TABLET    Take 75 mcg by mouth Daily    MELATONIN 5 MG TABS TABLET    Take 5 mg by mouth daily METOPROLOL SUCCINATE (TOPROL XL) 25 MG EXTENDED RELEASE TABLET    Take 1 tablet by mouth daily    NALOXONE 4 MG/0.1ML LIQD NASAL SPRAY    1 spray by Nasal route as needed for Opioid Reversal    PEPPERMINT OIL LIQUID    Take 1 capsule by mouth as needed (FOR STOMACH UPSET)     TAMSULOSIN (FLOMAX) 0.4 MG CAPSULE    Take 0.4 mg by mouth 2 times daily. TENS UNIT MISC    by Does not apply route    THERAPEUTIC MULTIVITAMIN-MINERALS (THERAGRAN-M) TABLET    Take 1 tablet by mouth daily. TORSEMIDE (DEMADEX) 10 MG TABLET    TAKE 1 TAB BY MOUTH DAILY.     VITAMIN D (CHOLECALCIFEROL) 1000 UNITS CAPS CAPSULE    Take 1,000 Units by mouth daily         ALLERGIES     Erythromycin, Methocarbamol, Penicillins, Sulfa antibiotics, Tetanus toxoids, and Tetracyclines & related    FAMILYHISTORY       Family History   Problem Relation Age of Onset    Cancer Father         bladder          SOCIAL HISTORY       Social History     Tobacco Use    Smoking status: Former Smoker     Packs/day: 1.00     Years: 1.00     Pack years: 1.00     Types: Cigarettes     Quit date: 1960     Years since quittin.0    Smokeless tobacco: Never Used   Substance Use Topics    Alcohol use: Yes     Comment: nothing to drink x2 weeks, but previously drank 2-3 liquor daily    Drug use: No       SCREENINGS    Emily Coma Scale  Eye Opening: Spontaneous  Best Verbal Response: Oriented  Best Motor Response: Obeys commands  Emily Coma Scale Score: 15        PHYSICAL EXAM    (up to 7 for level 4, 8 or more for level 5)     ED Triage Vitals   BP Temp Temp Source Pulse Resp SpO2 Height Weight   21 1002 21 0959 21 0959 21 1002 21 0959 21 1002 21 0959 21 0959   96/64 98.6 °F (37 °C) Oral 104 24 99 % 5' 7\" (1.702 m) 199 lb (90.3 kg)       Physical Exam    DIAGNOSTIC RESULTS   LABS:    Labs Reviewed   CBC WITH AUTO DIFFERENTIAL - Abnormal; Notable for the following components:       Result Value WBC 14.0 (*)     RBC 3.86 (*)     Hemoglobin 12.0 (*)     Hematocrit 36.0 (*)     Neutrophils Absolute 11.8 (*)     Lymphocytes Absolute 0.7 (*)     Monocytes Absolute 1.5 (*)     All other components within normal limits    Narrative:     Performed at:  [place] [address] [facility]   Phone [phone]   COMPREHENSIVE METABOLIC PANEL - Abnormal; Notable for the following components:    Sodium 131 (*)     CO2 13 (*)     BUN 28 (*)     CREATININE 2.3 (*)     GFR Non- 27 (*)     GFR  33 (*)     Calcium 7.4 (*)     Total Protein 5.5 (*)     Albumin 3.0 (*)     AST 41 (*)     All other components within normal limits    Narrative:     Olivia Tovar tel. [phone],  Chemistry results called to and read back by Lisbeth Chavez RN, 02/04/2021  10:55, by [facility]  Performed at:  [place] [address] [facility]   Phone [phone]   URINE RT REFLEX TO CULTURE - Abnormal; Notable for the following components:    Clarity, UA SL CLOUDY (*)     Ketones, Urine 15 (*)     Blood, Urine LARGE (*)     Protein, UA 30 (*)     All other components within normal limits    Narrative:     Performed at:  [place] [address] [facility]   Phone [phone]   PROCALCITONIN - Abnormal; Notable for the following components:    Procalcitonin 0.26 (*)     All other components within normal limits    Narrative:     [address],  Chemistry results called to and read back by Lisbeth Chavez RN, 02/04/2021  10:55, by [facility]  Performed at:  [place] [address] [facility]   Phone [phone]   MICROSCOPIC URINALYSIS - Abnormal; Notable for the following components:    WBC, UA 6-9 (*)     RBC, UA >100 (*)     Epithelial Cells, UA 6-10 (*)     Bacteria, UA Rare (*) All other components within normal limits    Narrative:     Performed at:  [facility]) [address], [facility] [place][note 2] [employer]   Phone [phone]   CULTURE, BLOOD 1   CULTURE, BLOOD 2   CULTURE, URINE   SAMPLE POSSIBLE BLOOD BANK TESTING    Narrative:     Performed at:  [address], [facility] [place][note 2] [employer]   Phone [phone]   LACTIC ACID, PLASMA    Narrative:     Performed at:  [address], [address] [employer]   Phone [phone]    Narrative:     Performed at:  [address], [facility] [place][note 2] [employer]   Phone [phone]       All other labs were within normal range or not returned as of this dictation. EKG: All EKG's are interpreted by the Emergency Department Physician in the absence of a cardiologist.  Please see their note for interpretation of EKG. RADIOLOGY:   Non-plain film images such as CT, Ultrasound and MRI are read by the radiologist. Plain radiographic images are visualized and preliminarily interpreted by the ED Provider with the below findings:        Interpretation per the Radiologist below, if available at the time of this note:    CT ABDOMEN PELVIS WO CONTRAST Additional Contrast? None   Final Result   1. Findings compatible with extraperitoneal perforation of the lower urinary   tract. Fluid collection dorsal to the urinary bladder concerning for urinoma   2. Trace right pleural effusion   3. Cholelithiasis   4. Diverticulosis         XR CHEST PORTABLE   Final Result   Low lung volume study with minimal bibasilar airspace disease, likely   atelectasis in the absence of clinical signs of pneumonia.          XR SHOULDER RIGHT (MIN 2 VIEWS)   Final Result   Addendum 1 of 1   ADDENDUM:   Note that study performed was of the right shoulder         Final      XR RADIUS ULNA LEFT (2 VIEWS) Final Result   Negative for fracture         CT Head WO Contrast   Final Result   No acute intracranial abnormality. CT Cervical Spine WO Contrast   Final Result   No acute abnormality of the cervical spine. Ct Head Wo Contrast    Result Date: 2/4/2021  EXAMINATION: CT OF THE HEAD WITHOUT CONTRAST  2/4/2021 10:30 am TECHNIQUE: CT of the head was performed without the administration of intravenous contrast. Dose modulation, iterative reconstruction, and/or weight based adjustment of the mA/kV was utilized to reduce the radiation dose to as low as reasonably achievable. COMPARISON: 08/17/2018 HISTORY: ORDERING SYSTEM PROVIDED HISTORY: Fall TECHNOLOGIST PROVIDED HISTORY: Reason for exam:->Fall Has a \"code stroke\" or \"stroke alert\" been called? ->No Decision Support Exception->Emergency Medical Condition (MA) Reason for Exam: fall last night and this am Acuity: Acute Type of Exam: Initial FINDINGS: BRAIN/VENTRICLES: There is no hemorrhage, edema, or mass effect. There is generalized atrophy ORBITS: The visualized portion of the orbits demonstrate no acute abnormality. SINUSES: There is mucosal thickening and bony wall thickening of the left maxillary sinus compatible with chronic sinusitis SOFT TISSUES/SKULL:  No acute abnormality of the visualized skull or soft tissues. No acute intracranial abnormality.      Ct Cervical Spine Wo Contrast    Result Date: 2/4/2021 EXAMINATION: CT OF THE CERVICAL SPINE WITHOUT CONTRAST 2/4/2021 10:30 am TECHNIQUE: CT of the cervical spine was performed without the administration of intravenous contrast. Multiplanar reformatted images are provided for review. Dose modulation, iterative reconstruction, and/or weight based adjustment of the mA/kV was utilized to reduce the radiation dose to as low as reasonably achievable. COMPARISON: None. HISTORY: ORDERING SYSTEM PROVIDED HISTORY: Fall TECHNOLOGIST PROVIDED HISTORY: Reason for exam:->Fall Decision Support Exception->Emergency Medical Condition (MA) Reason for Exam: neck pain from fall Acuity: Acute Type of Exam: Initial FINDINGS: BONES/ALIGNMENT: No evidence of fracture or traumatic subluxation DEGENERATIVE CHANGES: Degenerative disc disease C3-C4 through C7-T1 most severe at C4-C5 through C6-C7. Slight degenerative retrolisthesis of C4 and C5. Diffuse facet osteoarthritis. Slight degenerative anterolisthesis of C3 and C7. SOFT TISSUES: There is no prevertebral soft tissue swelling. No acute abnormality of the cervical spine. PROCEDURES   Unless otherwise noted below, none     Procedures    CRITICAL CARE TIME   Critical Care  There was a high probability of life-threatening clinical deterioration in the patient's condition requiring my urgent intervention. Total critical care time with the patient was 55 minutes excluding separately reportable procedures. Critical care required due to patients diagnosis minimal urine output with some urine noted on bladder scan. Patient required Wills catheter placement at the request of urology Dr. Fanta Madrid. Patient with elevated WBC requiring IV antibiotics. Patient with abnormal CT showing a potential urethral/bladder injury following TURP yesterday. Patient be transferred to [facility].  Time spent discussing case with family members, attending physician and transferring accepting physician.       CONSULTS:  12 Brittney Goldman At 1:45 PM I spoke with urologist Dr. Conner Vogel. He would like the patient transferred to [facility], his service and he will direct admit. We have utilized the transfer center. He is recommended placement of a 22 Albanian three-way Wills catheter and pretreatment with Urojet. He is also recommended Rocephin 1 g IV as he has an elevated WBC and he is postop. Cultures obtained prior to the placement. Once the catheter is placed we will obtain UA as is possible and submit to urine culture. Dr. Conner Vogel has also requested a copy of the CT images on disc. They will be sent with the patient. At 2:05 PM I spoke with patient's daughter Carly Mccormick. Carly Mccormick expresses concern on transfer back to [facility] due to premature discharge and the patient not doing well overnight with increasing creatinine, her understanding. I did not relate how her creatinine obtained today. She does indicate that she prefers transfer to . She will discuss with her mother/the patient's wife and get back to me regarding a decision on transfer or keeping patient at this facility. I did indicate to the the patient's daughter, Carly Mccormick, that there may be some hesitation and a another urologist picking up work performed by the initial urologist Dr. Conner Vogel. At approximately 2:20 PM I was notified the daughter/wife have approved transfer to [address] [place] [address] require a rapid Covid study that order has been placed. Wills catheter was placed by Henry.  400 mL urine removed. Urine sent. UA showing specific gravity of greater than 1.030, cloudy character with large blood and 15 ketones. Microscopic showing 69 WBC with rare bacteria and greater than 100 RBC. Patient's rapid Covid is negative. Patient will be discharged by BLS unit with Wills catheter in place and saline lock in place. FINAL IMPRESSION      1. Paraureteric urinoma    2.  S/P TURP (status post transurethral resection of prostate) 3. Other postoperative complication involving genitourinary system    4. SERA (acute kidney injury) [facility])          DISPOSITION/PLAN   DISPOSITION Decision To Transfer 02/04/2021 01:57:59 PM      PATIENT REFERREDTO:  No follow-up provider specified. DISCHARGE MEDICATIONS:  New Prescriptions    No medications on file       DISCONTINUED MEDICATIONS:  Discontinued Medications    No medications on file              (Please note that portions of this note were completed with a voice recognition program.  Efforts were made to edit the dictations but occasionally words are mis-transcribed. )    Boaz Naejra PA-C (electronically signed)           Boaz Najera PA-C  02/04/21 8105

## 2021-02-04 NOTE — ED NOTES
Bed: 04  Expected date:   Expected time:   Means of arrival:   Comments:  [address] Gomez RN  02/04/21 8542

## 2021-02-04 NOTE — ED NOTES
Blood culture set #1 drawn from RFA with angiocath. Bottle tops scrubbed with alcohol pads. Site prepped with Prevantics swab, 15 seconds per side, and allowed to dry for 30 seconds prior to venipuncture. Red waste tube drawn prior to collection of specimen.          Joshua Garcia RN  02/04/21 9291

## 2021-02-04 NOTE — ED NOTES
Bladder scanned pt at this time. Shows 11mL of urine in bladder. PA  Aware.      Micky Therese  02/04/21 1215

## 2021-02-04 NOTE — ED PROVIDER NOTES
I independently performed a history and physical on [address]. All diagnostic, treatment, and disposition decisions were made by myself in conjunction with the advanced practice provider.     -[address] is a 82 y.o. male presents to ED sp fall. Patient states his walker slipped from him and he fell forward, hitting his head on the ground. Endorses loss of consciousness. Patient states he's fallen x3 last night after he got home from urologist office, stating that he got 'very weak.' Patient complains of right knee and left arm pain sp fall. Reports his chronic neck and back pain.   -Patient reports lower abdominal pain ever since his recent TURP procedure with hematuria which he states was told was normal.   -Per care everywhere patient had cystoscopy with TURP procedure by Dr. Kamari Molina on 2/2/2021.  -PE: well appearing, nontoxic, not in acute distress. RRR, CTAB/l, no w/r/r, abdomen soft, ND, NTTP, + BS x 4, no rigidity, rebound, guarding. Abrasion to right knee, multiple skin tears to left arm.   -lab workup significant for: Elevated BUN of 28, creatinine of 2.3 (was 1.08 yesterday), leukocytosis of 14  -CT head/C-spine shows no acute  Abnormality  -X-ray of left forearm, right shoulder, chest x-ray shows no acute findings  -CT abdomen and pelvis shows findings compatible with extraperitoneal perforation of the lower urinary tract. Fluid collection dorsal to the urinary bladder concerning for urinoma.  -The Ekg interpreted by me shows  sinus tachycardia, rmdu=900   Axis is   Normal  QTc is  440  Intervals and Durations are unremarkable. ST Segments: no acute change  No significant change from prior EKG dated 10/4/2020  -UA shows 6-9 WBC, greater than 100 RBC and rare bacteria  -Consulted Dr. Alcon Anthony regarding patient's findings of possible bladder perforation.   He recommends starting antibiotics, 22 Libyan urinary Wills and he will direct admit to [facility]. For further details of [facility] emergency department encounter, please see PABLO Vyas documentation.         Michelle Art MD  02/04/21 1297

## 2021-02-04 NOTE — ED NOTES
Report called to CASEY Benz at [facility] ([phone]). -RN notified writer that patient will be going to room 5028. Family and patient aware.      Gunnar Galloway RN  02/04/21 8225

## 2021-02-04 NOTE — ED NOTES
Cleaned 4 skin tear on left lower arm with soap and ns dressed with non stick gauze with soft gauze roll patient tolerated well     [place] José Miguel  02/04/21 4975

## 2021-02-04 NOTE — ED NOTES
Patients daughter Brandy Small called and updated at this time per patient permission. Family verbalized understanding of results as of now. RN notified family that RN will call back once Urology consult is complete.      Ishmael Londono RN  02/04/21 8072

## 2021-02-04 NOTE — ED NOTES
Patients daughter Julia Turner called and updated at this time.       Marcelino Naranjo RN  02/04/21 4209

## 2021-02-04 NOTE — ED NOTES
Patient tolerated catheter insertion well. Patient resting comfortably in bed at this time. States no current needs. Call light remains within reach.      Jose C Pollard RN  02/04/21 2077

## 2021-02-04 NOTE — ED NOTES
CT called for imaging to be placed on disc for patient transfer to [address] [place], [place] [address]garcia Mayo, CASEY  02/04/21 9942

## 2021-02-04 NOTE — ED NOTES
Patient resting in bed at this time. Patient updated on room assignment at [facility]. No other needs at this time, call light within reach. RN awaiting transport time. Will notify patient and family when time is provided.       Gerhard Kauffman RN  02/04/21 1104

## 2021-02-04 NOTE — ED NOTES
RN spoke to patients daughter Emelda Dakins who states to proceed with patient transfer to [address] [place], [place] [address]lisseth Burns, [address] and ED  aware.       Ary Causey RN  02/04/21 7227

## 2021-02-04 NOTE — ED NOTES
Fall risk screening completed. Fall risk bracelet applied to patient. Non-skid socks provided and placed on patient. The fall risk is indicated using  dome light . Based on score, a bed alarm was indicated and applied. The call light is within the patient's reach, and instructions for use were provided. The bed is in the lowest position with wheels locked. The patient has been advised to notify staff, using the call light, if there is a need to get up or use restroom. The patient verbalized understanding of safety precautions and how to contact staff for assistance.        Cesar Franco RN  02/04/21 3099

## 2021-02-04 NOTE — [facility] REMOTE MONITORING
Spoke to primary RN regarding sepsis bundle.     Jamin Cruz RN, [facility]  [phone]

## 2021-02-04 NOTE — ED NOTES
Patient urinated per bedpan. Sample collected and sent to lab.      Gunnar Galloway RN  02/04/21 2959

## 2021-02-05 LAB — URINE CULTURE, ROUTINE: NORMAL

## 2021-02-05 NOTE — ED NOTES
[facility] already [facility] [address] [facility]. [facility] formed signed by two RNs - pt with slight confusion at time of transfer (stated he was at his nursing home and asked where his dog was). Mitzi Alaniz already spoke with pts family regarding transfer. [employer] left with pt at this time. Pt left [facility] ED in stable condition.  All belongings sent with pt.     Christa Mtz RN  02/04/21 1945

## 2021-02-08 LAB
BLOOD CULTURE, ROUTINE: NORMAL
CULTURE, BLOOD 2: NORMAL

## 2022-01-28 NOTE — PLAN OF CARE
[facility], [facility]  [address] [place] [address]  Phone: [phone]  Fax [phone]    Physical Therapy Certification    Dear Referring Practitioner: Juan Manuel Caldwell,    We had the pleasure of evaluating the following patient for physical therapy services at [address] [place] [address]. A summary of our findings can be found in the initial assessment below. This includes our plan of care. If you have any questions or concerns regarding these findings, please do not hesitate to contact me at the office phone number checked above. Thank you for the referral.       Physician Signature:_______________________________Date:__________________  By signing above (or electronic signature), therapists plan is approved by physician    Patient: Dyan Merino   : 1935   MRN: [id number]  Referring Physician: Referring Practitioner: Juan Manuel Caldwell      Evaluation Date: 2017      Medical Diagnosis Information:  Diagnosis: Lumbar OA with radiculopathy (M47.27)     Treatment Diagnosis: Low back pain (M54.5), Neck pain (M54.2)                                Insurance information: PT Insurance Information: [employer]     Precautions/ Contra-indications:    Latex Allergy:  [x]NO      []YES  Preferred Language for Healthcare:   [x]English       []other:    SUBJECTIVE: Patient stated complaint: Patient reports he has had chronic LBP for years. Had spine surgery this past October to clean out some arthritis and prevent nerve compression. Was restricted in lifting for a while. Pain has been much less in his back since the surgery. Also has a lot of degeneration in his neck and gets pain that radiates down both arms and into his hands. This seems to be episodic in nature. Feels weakness throughout upper and lower extremities and decreased balance. Cannot stand longer than 5-10 minutes.  Feels pretty stiff in the morning and then more sore towards the Universal Instructions Sheet    CARLY,    Your procedure/Surgery is scheduled at [facility]:  [address]   on _2/7_ at  9:00AM, please plan to arrive at 7:00AM__.    Please do NOT follow the arrival time on your live well compa, it is incorrect and doing so may cause your procedure to be cancelled (we are working on the issue).    Free  service is available Monday through Friday starting at 8am until 3:30pm     If you have received the COVID vaccine, bring your vaccination card with you the day of your procedure.    Please bring your insurance card, 's license and a list of your medications, vitamins and supplements to the hospital, including the last dosage and time taken.    ON DAY OF PROCEDURE, CHECK IN AT:    Day Surgery - located on the 2nd floor. ([phone])              Important Information:      Hibiclens Highly Recommended    Please bathe or shower the evening before and morning of your procedure/surgery.  Avoid using lotions, creams, powders, make-up and deodorant on your clean skin.    Remove all jewelry, earrings, body piercings and contact lenses before coming to the hospital.    You may wear you glasses, hearing aids and/or dentures to the hospital. Please bring a case as they will need to be removed prior to the procedure/surgery.    If you are a smoker, please DO NOT SMOKE FOR 12 HOURS PRIOR  to your procedure/surgery.  This includes cigarettes, marijuana and vaping.    If your doctor mentioned that you may might spend the night, please bring a small overnight bag with toiletries and any personal items you may need.  If you use a CPAP machine and will be spending the night, bring your machine, tubing and mask.    Due to anesthesia, you will not be able to operate power tools, drink alcohol, or drive a vehicle for 24 hours after surgery.  You will not be able to walk home, use public transportation or take a cab, Uber or Lyft home.      *You must have a  responsible adult (18 or older) to drive you home and stay with you overnight following the procedure/surgery.*    Diet:      DO NOT eat any solid food after midnight prior to your procedure/surgery. This includes hard candy, gum and mints. Eating after midnight could cause a delay or cancellation of your procedure/surgery.        You may have clear liquids up until 4 hours before your procedure/surgery. This includes liquids that do not contain pulp, dairy or cloudiness. Clear liquids include beverages like water, apple juice, sprite/7Up, broth, Jello, black coffee, and tea.       Medication Instructions:      Starting now, STOP taking supplements, vitamins containing vitamin E, and fish oil.    STOP taking non-steroidal, anti-inflammatory drugs, otherwise known as NSAIDS per your surgeon's instructions.  Examples of NSAIDS are Aleve, ibuprofen, Motrin, Advil and Naproxen.              Take only the following medications before coming to the hospital. If they are pills, you may take them with a small sip of water: SERTRALINE_    You may also take the following medications if needed: ALPRAZOLAM__.      If there are any changes in your physical condition, such as cold, fever or infection, or you have specific questions regarding your procedure/surgery, please contact your surgeon directly.    For questions regarding these instructions, contact Pre-Admission Testing at [phone], Monday through Friday, 8 am - 4 pm.    On the day of your procedure, please bring in a copy of your complete up-to-date medication list, I.D., insurance card and COVID vaccine card (if you have been vaccinated).       INSTRUCTIONS PRESURGERY SHOWER/BATH   DO NOT: shave any part of the surgical area for at least 2 days prior to surgery.   Tiny skin cuts and abrasions from shaving in the surgical area can increase risk of infection.   DO NOT use lotions on skin before surgery.   DO use a fresh clean towel after each shower.   DO dress in  clean clothes after each of your showers.   DO shower or bathe your whole body, including hair, on the night before surgery.   DO shower again on the morning of surgery if time permits (do not wash hair during this shower)     GENERAL SHOWER/BATH INSTRUCTIONS (SOAP AND WATER)   1. Use warm, but not hot, water for shower/bath       NOTE: Showers are recommended. Showers are more effective than a bath for pre-operative skin cleaning   2. Use regular soap to remove dirt and germs from your skin   3. Do not scrub so vigorously that skin is abraded or scratched   4. Use clean towels to gently dry your skin after each shower/bath          Instructions for Preoperative Skin Cleansing Before Planned Surgical Procedure at [facility]   For your safety and to help prevent infection, please follow this:    HIGHLY RECOMMENDED additional antiseptic shower/bath.   ANTISEPTIC SHOWER/BATH PROCESS     e.g. HIBICLENS* or alternative chlorhexidine (CHG) containing skin antisepsis solutions   Chlorhexidine gluconate (2% - 4%) skin cleansing solutions are wash solutions used to kill germs on the skin. They are available over the counter (no prescription needed) at minimal cost at most pharmacies and drug stores.   *WHEN USING HIBICLENS* MAY CAUSE SHOWER AND BATHTUB SURFACES TO BECOME SLIPPERY*   Instructions for \"whole body\" shower or bath with chlorhexidine gluconate (CHG) antiseptic solution   1. After your required shower or bath, rinse thoroughly.   2. Step away from the stream of water, and thoroughly apply enough solution to cover skin below the neck (not head or face), within skin folds, and in hard to reach areas (e.g. the back of the knees, inside elbows, etc). Use your hands to apply and rub onto your skin without using a washcloth. Note: Solution will NOT suds up when applying   3. Rinse thoroughly.   4. Dry off with a clean towel.   5. Dress in clean clothes.   • DO NOT use on the head or face DO NOT use in the genital  []Cognitive Function, education/learning barriers              []Environmental, home barriers              []profession/work barriers  []past PT/medical experience  []other:  Justification: Patient has multiple comorbidities that may limit progress. Falls Risk Assessment (30 days):   [] Falls Risk assessed and no intervention required. [x] Falls Risk assessed and Patient requires intervention due to being higher risk   TUG score (>12s at risk):     [x] Falls education provided, including safety awareness during gait, the importance of regular exercise to strength BLEs and prevent falls.       G-Codes:       ASSESSMENT:  Functional Impairments:     [x]Noted lumbar/proximal hip hypomobility   []Noted lumbosacral and/or generalized hypermobility   [x]Decreased Lumbosacral/hip/LE functional ROM   [x]Decreased core/proximal hip strength and neuromuscular control    [x]Decreased LE functional strength    []Abnormal reflexes/sensation/myotomal/dermatomal deficits  [x]Reduced balance/proprioceptive control    []other:      Functional Activity Limitations (from functional questionnaire and intake)   [x]Reduced ability to tolerate prolonged functional positions   []Reduced ability or difficulty with changes of positions or transfers between positions   [x]Reduced ability to maintain good posture and demonstrate good body mechanics with sitting, bending, and lifting   []Reduced ability to sleep   [x] Reduced ability or tolerance with driving and/or computer work   [x]Reduced ability to perform lifting, reaching, carrying tasks   [x]Reduced ability to squat   []Reduced ability to forward bend   [x]Reduced ability to ambulate prolonged functional periods/distances/surfaces   [x]Reduced ability to ascend/descend stairs   []other:       Participation Restrictions   [x]Reduced participation in self care activities   [x]Reduced participation in home management activities   []Reduced participation in work activities   [x]Reduced area   • DO NOT use on non-healing wounds DO NOT use in eyes, ears or mouth.   Making sure that your skin is clean and ready for surgery at home is very important.   You can reduce the risk of infection by following the required instructions above.     EMAIL

## 2022-04-26 ENCOUNTER — APPOINTMENT (OUTPATIENT)
Dept: GENERAL RADIOLOGY | Age: 87
End: 2022-04-26
Payer: MEDICARE

## 2022-04-26 ENCOUNTER — APPOINTMENT (OUTPATIENT)
Dept: CT IMAGING | Age: 87
End: 2022-04-26
Payer: MEDICARE

## 2022-04-26 ENCOUNTER — HOSPITAL ENCOUNTER (EMERGENCY)
Age: 87
Discharge: HOME | End: 2022-04-26
Attending: EMERGENCY MEDICINE
Payer: MEDICARE

## 2022-04-26 VITALS
WEIGHT: 210 LBS | BODY MASS INDEX: 30.06 KG/M2 | HEART RATE: 73 BPM | RESPIRATION RATE: 16 BRPM | DIASTOLIC BLOOD PRESSURE: 74 MMHG | HEIGHT: 70 IN | OXYGEN SATURATION: 97 % | TEMPERATURE: 98.2 F | SYSTOLIC BLOOD PRESSURE: 168 MMHG

## 2022-04-26 DIAGNOSIS — R41.0 EPISODE OF CONFUSION: ICD-10-CM

## 2022-04-26 DIAGNOSIS — F03.90 DEMENTIA WITHOUT BEHAVIORAL DISTURBANCE, UNSPECIFIED DEMENTIA TYPE: Primary | ICD-10-CM
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
## 2022-04-26 DIAGNOSIS — R79.89 ELEVATED LACTIC ACID LEVEL: ICD-10-CM
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
## 2022-04-26 LAB
A/G RATIO: 1.8 (ref 1.1–2.2)
ALBUMIN SERPL-MCNC: 4 G/DL (ref 3.4–5)
ALP BLD-CCNC: 83 U/L (ref 40–129)
ALT SERPL-CCNC: 14 U/L (ref 10–40)
ANION GAP SERPL CALCULATED.3IONS-SCNC: 14 MMOL/L (ref 3–16)
AST SERPL-CCNC: 19 U/L (ref 15–37)
BASOPHILS ABSOLUTE: 0 K/UL (ref 0–0.2)
BASOPHILS RELATIVE PERCENT: 0.6 %
BILIRUB SERPL-MCNC: 0.5 MG/DL (ref 0–1)
BILIRUBIN URINE: NEGATIVE
BLOOD, URINE: NEGATIVE
BUN BLDV-MCNC: 10 MG/DL (ref 7–20)
CALCIUM SERPL-MCNC: 9.3 MG/DL (ref 8.3–10.6)
CHLORIDE BLD-SCNC: 98 MMOL/L (ref 99–110)
CLARITY: CLEAR
CO2: 25 MMOL/L (ref 21–32)
COLOR: YELLOW
CREAT SERPL-MCNC: 0.8 MG/DL (ref 0.8–1.3)
EKG ATRIAL RATE: 64 BPM
EKG DIAGNOSIS: NORMAL
EKG P AXIS: 90 DEGREES
EKG P-R INTERVAL: 254 MS
EKG Q-T INTERVAL: 390 MS
EKG QRS DURATION: 70 MS
EKG QTC CALCULATION (BAZETT): 402 MS
EKG R AXIS: 5 DEGREES
EKG T AXIS: 9 DEGREES
EKG VENTRICULAR RATE: 64 BPM
EOSINOPHILS ABSOLUTE: 0.3 K/UL (ref 0–0.6)
EOSINOPHILS RELATIVE PERCENT: 4.6 %
GFR AFRICAN AMERICAN: >60
GFR NON-AFRICAN AMERICAN: >60
GLUCOSE BLD-MCNC: 101 MG/DL (ref 70–99)
GLUCOSE URINE: NEGATIVE MG/DL
HCT VFR BLD CALC: 42 % (ref 40.5–52.5)
HEMOGLOBIN: 13.8 G/DL (ref 13.5–17.5)
KETONES, URINE: NEGATIVE MG/DL
LACTIC ACID: 2.2 MMOL/L (ref 0.4–2)
LACTIC ACID: 2.3 MMOL/L (ref 0.4–2)
LACTIC ACID: 4.5 MMOL/L (ref 0.4–2)
LEUKOCYTE ESTERASE, URINE: NEGATIVE
LYMPHOCYTES ABSOLUTE: 1.2 K/UL (ref 1–5.1)
LYMPHOCYTES RELATIVE PERCENT: 20.9 %
MCH RBC QN AUTO: 30.3 PG (ref 26–34)
MCHC RBC AUTO-ENTMCNC: 32.9 G/DL (ref 31–36)
MCV RBC AUTO: 91.9 FL (ref 80–100)
MICROSCOPIC EXAMINATION: NORMAL
MONOCYTES ABSOLUTE: 0.6 K/UL (ref 0–1.3)
MONOCYTES RELATIVE PERCENT: 11.2 %
NEUTROPHILS ABSOLUTE: 3.5 K/UL (ref 1.7–7.7)
NEUTROPHILS RELATIVE PERCENT: 62.7 %
NITRITE, URINE: NEGATIVE
PDW BLD-RTO: 13 % (ref 12.4–15.4)
PH UA: 5.5 (ref 5–8)
PLATELET # BLD: 204 K/UL (ref 135–450)
PMV BLD AUTO: 7.9 FL (ref 5–10.5)
POTASSIUM REFLEX MAGNESIUM: 4 MMOL/L (ref 3.5–5.1)
PROTEIN UA: NEGATIVE MG/DL
RBC # BLD: 4.57 M/UL (ref 4.2–5.9)
REASON FOR REJECTION: NORMAL
REASON FOR REJECTION: NORMAL
REJECTED TEST: NORMAL
REJECTED TEST: NORMAL
SODIUM BLD-SCNC: 137 MMOL/L (ref 136–145)
SPECIFIC GRAVITY UA: <=1.005 (ref 1–1.03)
TOTAL CK: 148 U/L (ref 39–308)
TOTAL PROTEIN: 6.2 G/DL (ref 6.4–8.2)
URINE REFLEX TO CULTURE: NORMAL
URINE TYPE: NORMAL
UROBILINOGEN, URINE: 0.2 E.U./DL
WBC # BLD: 5.5 K/UL (ref 4–11)

## 2022-04-26 PROCEDURE — 83605 ASSAY OF LACTIC ACID: CPT

## 2022-04-26 PROCEDURE — 2580000003 HC RX 258: Performed by: EMERGENCY MEDICINE

## 2022-04-26 PROCEDURE — 70450 CT HEAD/BRAIN W/O DYE: CPT

## 2022-04-26 PROCEDURE — 93010 ELECTROCARDIOGRAM REPORT: CPT | Performed by: INTERNAL MEDICINE

## 2022-04-26 PROCEDURE — 85025 COMPLETE CBC W/AUTO DIFF WBC: CPT

## 2022-04-26 PROCEDURE — 82550 ASSAY OF CK (CPK): CPT

## 2022-04-26 PROCEDURE — 99285 EMERGENCY DEPT VISIT HI MDM: CPT

## 2022-04-26 PROCEDURE — 71045 X-RAY EXAM CHEST 1 VIEW: CPT

## 2022-04-26 PROCEDURE — 93005 ELECTROCARDIOGRAM TRACING: CPT | Performed by: EMERGENCY MEDICINE

## 2022-04-26 PROCEDURE — 81003 URINALYSIS AUTO W/O SCOPE: CPT

## 2022-04-26 PROCEDURE — 80053 COMPREHEN METABOLIC PANEL: CPT

## 2022-04-26 PROCEDURE — 72125 CT NECK SPINE W/O DYE: CPT

## 2022-04-26 RX ORDER — 0.9 % SODIUM CHLORIDE 0.9 %
1000 INTRAVENOUS SOLUTION INTRAVENOUS ONCE
Status: COMPLETED | OUTPATIENT
Start: 2022-04-26 | End: 2022-04-26

## 2022-04-26 RX ORDER — 0.9 % SODIUM CHLORIDE 0.9 %
500 INTRAVENOUS SOLUTION INTRAVENOUS ONCE
Status: COMPLETED | OUTPATIENT
Start: 2022-04-26 | End: 2022-04-26

## 2022-04-26 RX ADMIN — SODIUM CHLORIDE 500 ML: 9 INJECTION, SOLUTION INTRAVENOUS at 09:05

## 2022-04-26 RX ADMIN — SODIUM CHLORIDE 1000 ML: 9 INJECTION, SOLUTION INTRAVENOUS at 06:54

## 2022-04-26 ASSESSMENT — PAIN - FUNCTIONAL ASSESSMENT: PAIN_FUNCTIONAL_ASSESSMENT: NONE - DENIES PAIN

## 2022-04-26 NOTE — ED PROVIDER NOTES
Emergency Physician Note        Note Open Time: 7:18 AM EDT    Chief Complaint  Fall (dementia patient found on the sidewalk after going on a walk confused; Pt denies any injury)       History of Present Illness  Nayeli Suarez is a 82 y.o. male who presents to the ED for found outside. Patient gives no history. EMS reports they were called for patient outside. They found the patient on the ground with his pants wrapped around his head. He states he was using them to keep his head warm. Patient has dementia and was not far from his home but was outside of his home in the middle of the night. Patient reports he did fall. He denies any pain complaints other than his chronic pain complaints which are treated with Percocet. 10 systems reviewed, pertinent positives per HPI otherwise noted to be negative    I have reviewed the following from the nursing documentation:      Prior to Admission medications    Medication Sig Start Date End Date Taking? Authorizing Provider   oxyCODONE-acetaminophen (PERCOCET) 5-325 MG per tablet Take 1 tablet by mouth 3 times daily for 21 days. 4/21/22 5/12/22  Tanya Monroe MD   oxyCODONE-acetaminophen (PERCOCET) 5-325 MG per tablet Take 1 tablet by mouth 3 times daily for 7 days. 5/6/22 5/13/22  Tanya Monroe MD   naloxone 4 MG/0.1ML LIQD nasal spray 1 spray by Nasal route as needed for Opioid Reversal 4/17/19   KAREN Black - CNP   diclofenac sodium (VOLTAREN) 1 % GEL APPLY 4 GRAMS TOPICALLY FOUR TIMES DAILY 1/29/19   Tanya Monroe MD   Tens Unit MISC by Does not apply route 10/10/18   Maida Reid PA-C   melatonin 5 MG TABS tablet Take 5 mg by mouth daily    Historical Provider, MD   torsemide (DEMADEX) 10 MG tablet TAKE 1 TAB BY MOUTH DAILY.  6/11/18   Historical Provider, MD   metoprolol succinate (TOPROL XL) 25 MG extended release tablet Take 1 tablet by mouth daily 8/18/18   Rayne Schwab, MD   peppermint oil liquid Take 1 capsule by mouth as needed (FOR STOMACH UPSET)     Historical Provider, MD   albuterol sulfate  (90 BASE) MCG/ACT inhaler Inhale 2 puffs into the lungs every 6 hours as needed for Wheezing    Historical Provider, MD   levothyroxine (SYNTHROID) 75 MCG tablet Take 75 mcg by mouth Daily    Historical Provider, MD   Vitamin D (CHOLECALCIFEROL) 1000 UNITS CAPS capsule Take 1,000 Units by mouth daily    Historical Provider, MD   aspirin 81 MG tablet Take 81 mg by mouth daily    Historical Provider, MD   atorvastatin (LIPITOR) 20 MG tablet Take 20 mg by mouth nightly. Historical Provider, MD   tamsulosin (FLOMAX) 0.4 MG capsule Take 0.4 mg by mouth 2 times daily. 1/28/11   Historical Provider, MD   finasteride (PROSCAR) 5 MG tablet Take 5 mg by mouth daily. Historical Provider, MD   therapeutic multivitamin-minerals (THERAGRAN-M) tablet Take 1 tablet by mouth daily.     Historical Provider, MD       Allergies as of 04/26/2022 - Fully Reviewed 03/08/2022   Allergen Reaction Noted    Erythromycin Hives 09/13/2016    Methocarbamol Other (See Comments) 11/06/2017    Penicillins Hives 01/26/2011    Sulfa antibiotics Hives 10/27/2010    Tetanus toxoids Hives 01/26/2011    Tetracyclines & related  01/26/2011       Past Medical History:   Diagnosis Date    Arthritis     Asthma     BPH     CAD (coronary artery disease)     Chest pain     Hearing decreased     HEARING AIDS    Hyperlipidemia     Hypertension     Need for post exposure prophylaxis for rabies 1980    Thyroid disease     hypothyroidism    Wears glasses         Surgical History:   Past Surgical History:   Procedure Laterality Date    BACK SURGERY      DISCECTOMY    BACK SURGERY  11/2017    lamenectomy, facetotomy, decompression l2-3, l3-4, l4-5    CATARACT REMOVAL Left 6/17/16    CATARACT REMOVAL WITH IMPLANT Right     COLONOSCOPY  2011    COLONOSCOPY  6/28/11    polyps    COLONOSCOPY  8/14    polyps    CORONARY ANGIOPLASTY WITH STENT PLACEMENT 07    CYST REMOVAL  1/3/2014    central back    FINGER SURGERY      GROWTH ON FIRST FINGER REMOVED    FRACTURE SURGERY  age 14    nose    HIP SURGERY Left 2017    LEFT LATERAL TOTAL HIP REPLACEMENT WITH CELLSAVER AND    JOINT REPLACEMENT Right     TKR    JOINT REPLACEMENT Left 2013    TKR    JOINT REPLACEMENT Right     THR    KNEE SURGERY      LUMBAR LAMINECTOMY N/A 2017    micro lumbar laminectomy L2-L3        Family History:    Family History   Problem Relation Age of Onset    Cancer Father         bladder       Social History     Socioeconomic History    Marital status:      Spouse name: Not on file    Number of children: Not on file    Years of education: Not on file    Highest education level: Not on file   Occupational History    Not on file   Tobacco Use    Smoking status: Former Smoker     Packs/day: 1.00     Years: 1.00     Pack years: 1.00     Types: Cigarettes     Quit date: 1960     Years since quittin.2    Smokeless tobacco: Never Used   Substance and Sexual Activity    Alcohol use: Yes     Comment: nothing to drink x2 weeks, but previously drank 2-3 liquor daily    Drug use: No    Sexual activity: Not on file   Other Topics Concern    Not on file   Social History Narrative    Not on file     Social Determinants of Health     Financial Resource Strain:     Difficulty of Paying Living Expenses: Not on file   Food Insecurity:     Worried About Running Out of Food in the Last Year: Not on file    Brian of Food in the Last Year: Not on file   Transportation Needs:     Lack of Transportation (Medical): Not on file    Lack of Transportation (Non-Medical):  Not on file   Physical Activity:     Days of Exercise per Week: Not on file    Minutes of Exercise per Session: Not on file   Stress:     Feeling of Stress : Not on file   Social Connections:     Frequency of Communication with Friends and Family: Not on file    Frequency of Social Gatherings with Friends and Family: Not on file    Attends Christianity Services: Not on file    Active Member of Clubs or Organizations: Not on file    Attends Club or Organization Meetings: Not on file    Marital Status: Not on file   Intimate Partner Violence:     Fear of Current or Ex-Partner: Not on file    Emotionally Abused: Not on file    Physically Abused: Not on file    Sexually Abused: Not on file   Housing Stability:     Unable to Pay for Housing in the Last Year: Not on file    Number of [place] in the Last Year: Not on file    Unstable Housing in the Last Year: Not on file       Nursing notes reviewed. ED Triage Vitals [04/26/22 3915]   Enc Vitals Group      BP (!) 140/85      Pulse 83      Resp 19      Temp 97.7 °F (36.5 °C)      Temp Source Oral      SpO2 97 %      Weight 210 lb (95.3 kg)      Height 5' 10\" (1.778 m)      Head Circumference       Peak Flow       Pain Score       Pain Loc       Pain Edu? Excl. in [address]? GENERAL:  Awake, alert. Well developed, well nourished with no apparent distress. HENT:  Normocephalic, Atraumatic, moist mucous membranes. EYES:  Pupils equal round and reactive to light, Conjunctiva normal, extraocular movements normal.  NECK:  No meningeal signs, Supple. No tenderness. CHEST:  Regular rate and rhythm, chest wall non-tender. LUNGS:  Clear to auscultation bilaterally. ABDOMEN:  Soft, non-tender, no rebound, rigidity or guarding, non-distended, normal bowel sounds. No costovertebral angle tenderness to palpation. BACK:  No tenderness. No step-offs, contusions or abrasions throughout the cervical, thoracic or lumbar spine. EXTREMITIES:  Normal range of motion, no edema, no bony tenderness, no deformity, distal pulses present. Remainder of axial and appendicular skeleton NT exc as noted. Full active ROM as well at all jts exc as noted. SKIN: Warm, dry and intact. NEUROLOGIC: Oriented to person only.  Moving all extremities to command. LABS and DIAGNOSTIC RESULTS  EKG  The Ekg interpreted by me shows  normal sinus rhythm with a rate of 64  Axis is   Normal  QTc is  normal  First-degree AV block     ST Segments: no acute change  Delta waves, Brugada Syndrome, and Short NV are not present. No significant change from prior EKG dated 2/4/21    RADIOLOGY  X-RAYS:  I have reviewed radiologic plain film image(s). ALL OTHER NON-PLAIN FILM IMAGES SUCH AS CT, ULTRASOUND AND MRI HAVE BEEN READ BY THE RADIOLOGIST. CT Cervical Spine WO Contrast   Final Result   No acute abnormality of the cervical spine. CT Head WO Contrast   Final Result   No acute intracranial abnormality. XR CHEST PORTABLE   Final Result   No acute process. LABS  Labs Reviewed   COMPREHENSIVE METABOLIC PANEL W/ REFLEX TO MG FOR LOW K - Abnormal; Notable for the following components:       Result Value    Chloride 98 (*)     Glucose 101 (*)     Total Protein 6.2 (*)     All other components within normal limits   LACTIC ACID - Abnormal; Notable for the following components:    Lactic Acid 4.5 (*)     All other components within normal limits    Narrative:     Kaitlin Enrique tel. [phone],  Chemistry results called to and read back by CASEY Hayes, 04/26/2022 06:32,  by [facility]   CBC WITH AUTO DIFFERENTIAL   URINALYSIS WITH REFLEX TO CULTURE   SPECIMEN REJECTION    Narrative:     Kaitlin Enrique tel. [phone],  Rejected Test Name/Called to: emerson Li rn, 04/26/2022 05:37, by [id number] [facility]    Narrative:     Blakeeusebio Ken [phone],  Rejected Test Name/Called to: Laneta Schlatter, 04/26/2022 05:38, by [address]          7:20 AM: I discussed the history, physical, and treatment plan with Dr. Laurie Ochoa. Pricila Kumar was signed out in stable condition. Please see Dr. Elpidio Dukes note for further details, including diagnosis and disposition. Clinical Impression    1.  Dementia without behavioral disturbance, unspecified [facility] type [facility]    2. Fall, initial encounter        Blood pressure (!) 147/89, pulse 93, temperature 97.7 °F (36.5 °C), temperature source Oral, resp. rate 16, height 5' 10\" (1.778 m), weight 210 lb (95.3 kg), SpO2 97 %. This chart was generated using the [address] [place], [place] [address]Th  dictation system. I created this record but it may contain dictation errors.           Sheryle Helm, MD  04/26/22 2756

## 2022-04-26 NOTE — ED PROVIDER NOTES
Signout: Patient was signed out to me from Dr. Edu Maynard at approximately 6 AM.  At time of signout labs and imaging results are pending. Brief HPI: Patient with history of dementia presents to the emergency department post fall. By report, patient reportedly slipped out of the house and went for a walk overnight. Patient was found laying down in a neighbors yard. Patient is alert and oriented upon arrival.  Patient is unsure of cause of fall. He denies any injury, or extremity pain. No chest pain, shortness of breath, or lightheadedness reported. Physical exam: General: No acute distress. Head: Normocephalic/atraumatic. Cervical spine: No midline tenderness, crepitus, or step-offs. Heart: Regular in rhythm peer normal S1-S2. Lungs: Clear to auscultation bilaterally. No wheeze, rales, rhonchi. Abdomen: Soft. Extremities: Full range of motion without difficulty. No gross deformity. Neurologic: Muscle strength 5/5 in all extremities. Sensation within normal limits. LABS  I have reviewed all labs for this visit.    Results for orders placed or performed during the hospital encounter of 04/26/22   CBC with Auto Differential   Result Value Ref Range    WBC 5.5 4.0 - 11.0 K/uL    RBC 4.57 4.20 - 5.90 M/uL    Hemoglobin 13.8 13.5 - 17.5 g/dL    Hematocrit 42.0 40.5 - 52.5 %    MCV 91.9 80.0 - 100.0 fL    MCH 30.3 26.0 - 34.0 pg    MCHC 32.9 31.0 - 36.0 g/dL    RDW 13.0 12.4 - 15.4 %    Platelets 816 960 - 580 K/uL    MPV 7.9 5.0 - 10.5 fL    Neutrophils % 62.7 %    Lymphocytes % 20.9 %    Monocytes % 11.2 %    Eosinophils % 4.6 %    Basophils % 0.6 %    Neutrophils Absolute 3.5 1.7 - 7.7 K/uL    Lymphocytes Absolute 1.2 1.0 - 5.1 K/uL    Monocytes Absolute 0.6 0.0 - 1.3 K/uL    Eosinophils Absolute 0.3 0.0 - 0.6 K/uL    Basophils Absolute 0.0 0.0 - 0.2 K/uL   Urinalysis with Reflex to Culture    Specimen: Urine   Result Value Ref Range    Color, UA Yellow Straw/Yellow    Clarity, UA Clear Clear    Glucose, Ur Negative Negative mg/dL    Bilirubin Urine Negative Negative    Ketones, Urine Negative Negative mg/dL    Specific Gravity, UA <=1.005 1.005 - 1.030    Blood, Urine Negative Negative    pH, UA 5.5 5.0 - 8.0    Protein, UA Negative Negative mg/dL    Urobilinogen, Urine 0.2 <2.0 E.U./dL    Nitrite, Urine Negative Negative    Leukocyte Esterase, Urine Negative Negative    Microscopic Examination Not Indicated     Urine Type NotGiven     Urine Reflex to Culture Not Indicated    SPECIMEN REJECTION   Result Value Ref Range    Rejected Test cmpx cpk     Reason for Rejection see below    SPECIMEN REJECTION   Result Value Ref Range    Rejected Test lacid     Reason for Rejection see below    EKG 12 Lead   Result Value Ref Range    Ventricular Rate 64 BPM    Atrial Rate 64 BPM    P-R Interval 254 ms    QRS Duration 70 ms    Q-T Interval 390 ms    QTc Calculation (Bazett) 402 ms    P Axis 90 degrees    R Axis 5 degrees    T Axis 9 degrees    Diagnosis       Sinus rhythm with 1st degree A-V block with Premature atrial complexesOtherwise normal ECGWhen compared with ECG of 04-FEB-2021 10:12,Premature atrial complexes are now PresentPR interval has increasedVent. rate has decreased BY  39 BPMCriteria for Inferior infarct are no longer Present           RADIOLOGY  X-RAYS:  I have reviewed radiologic plain film image(s). ALL OTHER NON-PLAIN FILM IMAGES SUCH AS CT, ULTRASOUND AND MRI HAVE BEEN READ BY THE RADIOLOGIST. CT Cervical Spine WO Contrast   Final Result   No acute abnormality of the cervical spine. CT Head WO Contrast   Final Result   No acute intracranial abnormality. XR CHEST PORTABLE   Final Result   No acute process. Rechecks: Physical assessment performed. 654: O2 sats 97% on room air at rest.        ED COURSE/MDM  Patient seen and evaluated. Old records reviewed. Labs and imaging reviewed and results discussed with patient.  Patient was given stable throughout his stay in the emergency department. Patient's imaging is stable. Labs concerning for elevated lactic. This improved significantly after saline bolus. Patient was otherwise asymptomatic and remained alert and oriented throughout stay in the emergency department. Patient was reassessed as noted above . No acute pathology was noted and pt is safe for discharge home to follow up PCP. Hyacinth Yan Plan of care discussed with patient and family. Patient and family in agreement with plan. Patient was given scripts for the following medications. I counseled patient how to take these medications. Discharge Medication List as of 4/26/2022 10:52 AM          CLINICAL IMPRESSION  1. Dementia without behavioral disturbance, unspecified dementia type ([facility] [address]    2. Fall, initial encounter    3. Fall on same level from tripping    4. Episode of confusion    5. Elevated lactic acid level        Blood pressure (!) 166/96, pulse 67, temperature 97.7 °F (36.5 °C), temperature source Oral, resp. rate 19, height 5' 10\" (1.778 m), weight 210 lb (95.3 kg), SpO2 97 %. Sarah Kerr was discharged to home in stable condition.         James D eLa Cruz DO  04/26/22 5760

## 2023-03-29 ENCOUNTER — HOSPITAL ENCOUNTER (OUTPATIENT)
Dept: CT IMAGING | Age: 88
Discharge: HOME | End: 2023-03-29
Payer: MEDICARE

## 2023-03-29 ENCOUNTER — HOSPITAL ENCOUNTER (OUTPATIENT)
Dept: MRI IMAGING | Age: 88
Discharge: HOME | End: 2023-03-29
Payer: MEDICARE

## 2023-03-29 DIAGNOSIS — C20 RECTAL CANCER (HCC): ICD-10-CM

## 2023-03-29 DIAGNOSIS — C20 MALIGNANT NEOPLASM OF RECTUM (HCC): ICD-10-CM

## 2023-03-29 LAB
PERFORMED ON: NORMAL
POC CREATININE: 0.8 MG/DL (ref 0.8–1.3)
POC SAMPLE TYPE: NORMAL

## 2023-03-29 PROCEDURE — 71260 CT THORAX DX C+: CPT

## 2023-03-29 PROCEDURE — 82565 ASSAY OF CREATININE: CPT

## 2023-03-29 PROCEDURE — A9579 GAD-BASE MR CONTRAST NOS,1ML: HCPCS | Performed by: INTERNAL MEDICINE

## 2023-03-29 PROCEDURE — 72197 MRI PELVIS W/O & W/DYE: CPT

## 2023-03-29 PROCEDURE — 6360000004 HC RX CONTRAST MEDICATION: Performed by: INTERNAL MEDICINE

## 2023-03-29 RX ADMIN — IOPAMIDOL 75 ML: 755 INJECTION, SOLUTION INTRAVENOUS at 15:52

## 2023-03-29 RX ADMIN — GADOTERIDOL 20 ML: 279.3 INJECTION, SOLUTION INTRAVENOUS at 17:07

## 2023-04-17 ENCOUNTER — TELEPHONE (OUTPATIENT)
Dept: SURGERY | Age: 88
End: 2023-04-17

## 2023-04-17 NOTE — TELEPHONE ENCOUNTER
Spoke to patient to schedule office visit with Dr. Sherry Michelle to discuss rectal cancer surgical options. Patient sees Dr. Jayashree Weston of [facility] has an office visit with him tomorrow. [facility] got CEA lab on 3/15 with a result of 6.74. MRI Pelvis done at [facility] on 3/29 with rectal cancer protocol. CT chest done at [facility] on 3/29. CT Abdomen Pelvis done at [facility] on 2/28-I have faxed a request to [phone] per [facility] request to have images pushed to [facility] [place] [facility] (Report is in [place]). M with [facility] to have colonoscopy and pathology reports from colonoscopy on 2/15 faxed to Dr. Maren Andrade office. Currently waiting to receive reports.

## 2023-04-18 NOTE — TELEPHONE ENCOUNTER
Spoke to [facility] to request colonoscopy and pathology reports be faxed over. Currently waiting to receive reports.

## 2023-04-21 ENCOUNTER — OFFICE VISIT (OUTPATIENT)
Dept: SURGERY | Age: 88
End: 2023-04-21

## 2023-04-21 VITALS
BODY MASS INDEX: 33.36 KG/M2 | SYSTOLIC BLOOD PRESSURE: 141 MMHG | WEIGHT: 233 LBS | DIASTOLIC BLOOD PRESSURE: 64 MMHG | HEIGHT: 70 IN | TEMPERATURE: 97.9 F | OXYGEN SATURATION: 98 % | HEART RATE: 70 BPM | RESPIRATION RATE: 16 BRPM

## 2023-04-21 DIAGNOSIS — C20 RECTAL CANCER (HCC): Primary | ICD-10-CM

## 2023-04-21 RX ORDER — DULOXETIN HYDROCHLORIDE 60 MG/1
60 CAPSULE, DELAYED RELEASE ORAL DAILY
COMMUNITY
Start: 2022-03-26

## 2023-04-21 NOTE — PROGRESS NOTES
excision would normally not be recommended but given his age and morbidity this might be the best option. I will review his images at tumor board next week and plan to determine if internal sphincter truly involved    The other option is chemotherapy and radiation. This also might be difficult to tolerate given his age and pulmonary issues    Will plan to present at [facility] tumor board and call next week with recommendations. Tentatively I think this is amenable to transanal excision.      Yue Francis M.D.  4/21/23   2:41 PM

## 2023-04-28 ENCOUNTER — CLINICAL DOCUMENTATION (OUTPATIENT)
Dept: SURGERY | Age: 88
End: 2023-04-28

## 2023-04-28 NOTE — PROGRESS NOTES
Franco Kingston  [id number]  1935    Rectal Cancer Tumor Board Pretreatment Summary:  Presented on 04/28/23    - Tumor location: low  - Sphincter involvement: possible internal sphincter  - Clinical Stage: cT1/2N0M0  - Pretreatment circumferential margin status: threatened  - CEA: 6.74 (3/15/23)  - Path reviewed by MDT member: yes - [place] Uribe  - MRI reviewed by MDT member: yes Antonietta Boone  - Neoadjuvant treatment recommendation: yes  - Type and duration of neoadjuvant therapy recommended: consider neoadjuvant chemo/rads  - Anticipated date and type of surgical procedure: possible transanal excision either before or after chemo/rads  - Clinical research study eligibility and/or enrollment: no    Other:  - MMR intact  - Per presenting surgeon, patient refusing APR/colostomy under any circumstances and may refuse chemo/rads as well   - consider palliative/debulking transanal excision if tumor amenable

## 2023-05-01 ENCOUNTER — TELEPHONE (OUTPATIENT)
Dept: SURGERY | Age: 88
End: 2023-05-01

## 2023-05-01 NOTE — TELEPHONE ENCOUNTER
Patient discussed at GI Tumor Board/. Will speak to Dr. Kenneth Machado to see what is the next step in the care plan.

## 2023-05-03 ENCOUNTER — TELEPHONE (OUTPATIENT)
Dept: SURGERY | Age: 88
End: 2023-05-03

## 2023-05-03 RX ORDER — METRONIDAZOLE 500 MG/1
TABLET ORAL
Qty: 3 TABLET | Refills: 0 | Status: SHIPPED | OUTPATIENT
Start: 2023-05-03

## 2023-05-03 RX ORDER — ONDANSETRON 4 MG/1
4 TABLET, ORALLY DISINTEGRATING ORAL 3 TIMES DAILY PRN
Qty: 3 TABLET | Refills: 0 | Status: SHIPPED | OUTPATIENT
Start: 2023-05-03

## 2023-05-03 RX ORDER — NEOMYCIN SULFATE 500 MG/1
TABLET ORAL
Qty: 6 TABLET | Refills: 0 | Status: SHIPPED | OUTPATIENT
Start: 2023-05-03

## 2023-05-03 NOTE — TELEPHONE ENCOUNTER
Patient has been scheduled for:    Procedure: Transanal excision rectal cancer  Date: 5/23/23  Time: 11:00am  Arrival: 9:00am  Hospital: [facility]     Covid:  ASA?: Aspirin 7 days  Prep? #2 reviewed by phone, sent to my-chart, mailed to home address    Pre-op? pcp    Post-op Appt? [facility]AST 6/16/23 at 10:00am     Patient advised they may be admitted for observation    Orders routed to surgery scheduling.     Instructions have been mailed/emailed to:    [facility] meds routed to [facility] for approval.

## 2023-05-22 ENCOUNTER — ANESTHESIA EVENT (OUTPATIENT)
Dept: OPERATING ROOM | Age: 88
End: 2023-05-22
Payer: MEDICARE

## 2023-05-22 ENCOUNTER — TELEPHONE (OUTPATIENT)
Dept: SURGERY | Age: 88
End: 2023-05-22

## 2023-05-22 NOTE — TELEPHONE ENCOUNTER
Tried calling patient or spouse. There was no answer and the line kept ringing, there was no voicemail. I will try to call again later this afternoon.

## 2023-05-23 ENCOUNTER — ANESTHESIA (OUTPATIENT)
Dept: OPERATING ROOM | Age: 88
End: 2023-05-23
Payer: MEDICARE

## 2023-05-23 ENCOUNTER — HOSPITAL ENCOUNTER (OUTPATIENT)
Age: 88
Setting detail: OBSERVATION
Discharge: HOME | End: 2023-05-24
Attending: SURGERY | Admitting: SURGERY
Payer: MEDICARE

## 2023-05-23 DIAGNOSIS — M47.812 OSTEOARTHRITIS OF CERVICAL SPINE, UNSPECIFIED SPINAL OSTEOARTHRITIS COMPLICATION STATUS: ICD-10-CM

## 2023-05-23 DIAGNOSIS — C20 RECTAL CANCER (HCC): ICD-10-CM

## 2023-05-23 DIAGNOSIS — G89.18 POST-OP PAIN: Primary | ICD-10-CM

## 2023-05-23 DIAGNOSIS — M51.26 DISC DISPLACEMENT, LUMBAR: ICD-10-CM

## 2023-05-23 DIAGNOSIS — M48.061 SPINAL STENOSIS OF LUMBAR REGION, UNSPECIFIED WHETHER NEUROGENIC CLAUDICATION PRESENT: ICD-10-CM

## 2023-05-23 DIAGNOSIS — M47.816 LUMBAR FACET ARTHROPATHY: ICD-10-CM

## 2023-05-23 DIAGNOSIS — M48.02 CERVICAL STENOSIS OF SPINAL CANAL: ICD-10-CM

## 2023-05-23 PROCEDURE — 94761 N-INVAS EAR/PLS OXIMETRY MLT: CPT

## 2023-05-23 PROCEDURE — 96375 TX/PRO/DX INJ NEW DRUG ADDON: CPT

## 2023-05-23 PROCEDURE — 45172 EXC RECT TUM TRANSANAL FULL: CPT | Performed by: SURGERY

## 2023-05-23 PROCEDURE — 2500000003 HC RX 250 WO HCPCS: Performed by: STUDENT IN AN ORGANIZED HEALTH CARE EDUCATION/TRAINING PROGRAM

## 2023-05-23 PROCEDURE — 7100000000 HC PACU RECOVERY - FIRST 15 MIN: Performed by: SURGERY

## 2023-05-23 PROCEDURE — 2580000003 HC RX 258: Performed by: FAMILY MEDICINE

## 2023-05-23 PROCEDURE — 2720000010 HC SURG SUPPLY STERILE: Performed by: SURGERY

## 2023-05-23 PROCEDURE — 2709999900 HC NON-CHARGEABLE SUPPLY: Performed by: SURGERY

## 2023-05-23 PROCEDURE — 2700000000 HC OXYGEN THERAPY PER DAY

## 2023-05-23 PROCEDURE — 3700000001 HC ADD 15 MINUTES (ANESTHESIA): Performed by: SURGERY

## 2023-05-23 PROCEDURE — 94150 VITAL CAPACITY TEST: CPT

## 2023-05-23 PROCEDURE — 6360000002 HC RX W HCPCS: Performed by: SURGERY

## 2023-05-23 PROCEDURE — 6370000000 HC RX 637 (ALT 250 FOR IP): Performed by: FAMILY MEDICINE

## 2023-05-23 PROCEDURE — 51798 US URINE CAPACITY MEASURE: CPT

## 2023-05-23 PROCEDURE — 3700000000 HC ANESTHESIA ATTENDED CARE: Performed by: SURGERY

## 2023-05-23 PROCEDURE — C9290 INJ, BUPIVACAINE LIPOSOME: HCPCS | Performed by: SURGERY

## 2023-05-23 PROCEDURE — 7100000001 HC PACU RECOVERY - ADDTL 15 MIN: Performed by: SURGERY

## 2023-05-23 PROCEDURE — 88307 TISSUE EXAM BY PATHOLOGIST: CPT

## 2023-05-23 PROCEDURE — 2500000003 HC RX 250 WO HCPCS: Performed by: SURGERY

## 2023-05-23 PROCEDURE — 2580000003 HC RX 258

## 2023-05-23 PROCEDURE — 3600000004 HC SURGERY LEVEL 4 BASE: Performed by: SURGERY

## 2023-05-23 PROCEDURE — 6370000000 HC RX 637 (ALT 250 FOR IP): Performed by: STUDENT IN AN ORGANIZED HEALTH CARE EDUCATION/TRAINING PROGRAM

## 2023-05-23 PROCEDURE — 6360000002 HC RX W HCPCS

## 2023-05-23 PROCEDURE — 2580000003 HC RX 258: Performed by: STUDENT IN AN ORGANIZED HEALTH CARE EDUCATION/TRAINING PROGRAM

## 2023-05-23 PROCEDURE — 6370000000 HC RX 637 (ALT 250 FOR IP): Performed by: SURGERY

## 2023-05-23 PROCEDURE — 2500000003 HC RX 250 WO HCPCS

## 2023-05-23 PROCEDURE — 3600000014 HC SURGERY LEVEL 4 ADDTL 15MIN: Performed by: SURGERY

## 2023-05-23 PROCEDURE — G0378 HOSPITAL OBSERVATION PER HR: HCPCS

## 2023-05-23 RX ORDER — TAMSULOSIN HYDROCHLORIDE 0.4 MG/1
0.4 CAPSULE ORAL 2 TIMES DAILY
Status: DISCONTINUED | OUTPATIENT
Start: 2023-05-24 | End: 2023-05-23

## 2023-05-23 RX ORDER — ONDANSETRON 4 MG/1
4 TABLET, ORALLY DISINTEGRATING ORAL EVERY 8 HOURS PRN
Status: DISCONTINUED | OUTPATIENT
Start: 2023-05-23 | End: 2023-05-24 | Stop reason: HOSPADM

## 2023-05-23 RX ORDER — LORAZEPAM 2 MG/ML
0.5 INJECTION INTRAMUSCULAR
Status: DISCONTINUED | OUTPATIENT
Start: 2023-05-23 | End: 2023-05-23 | Stop reason: HOSPADM

## 2023-05-23 RX ORDER — ONDANSETRON 2 MG/ML
4 INJECTION INTRAMUSCULAR; INTRAVENOUS EVERY 6 HOURS PRN
Status: DISCONTINUED | OUTPATIENT
Start: 2023-05-23 | End: 2023-05-24 | Stop reason: HOSPADM

## 2023-05-23 RX ORDER — ONDANSETRON 2 MG/ML
4 INJECTION INTRAMUSCULAR; INTRAVENOUS
Status: DISCONTINUED | OUTPATIENT
Start: 2023-05-23 | End: 2023-05-23 | Stop reason: HOSPADM

## 2023-05-23 RX ORDER — TAMSULOSIN HYDROCHLORIDE 0.4 MG/1
0.4 CAPSULE ORAL 2 TIMES DAILY
Status: DISCONTINUED | OUTPATIENT
Start: 2023-05-24 | End: 2023-05-24 | Stop reason: HOSPADM

## 2023-05-23 RX ORDER — SODIUM CHLORIDE 0.9 % (FLUSH) 0.9 %
5-40 SYRINGE (ML) INJECTION EVERY 12 HOURS SCHEDULED
Status: DISCONTINUED | OUTPATIENT
Start: 2023-05-23 | End: 2023-05-24 | Stop reason: HOSPADM

## 2023-05-23 RX ORDER — CLINDAMYCIN PHOSPHATE 900 MG/50ML
900 INJECTION INTRAVENOUS ONCE
Status: COMPLETED | OUTPATIENT
Start: 2023-05-23 | End: 2023-05-23

## 2023-05-23 RX ORDER — SODIUM CHLORIDE, SODIUM LACTATE, POTASSIUM CHLORIDE, CALCIUM CHLORIDE 600; 310; 30; 20 MG/100ML; MG/100ML; MG/100ML; MG/100ML
INJECTION, SOLUTION INTRAVENOUS CONTINUOUS PRN
Status: DISCONTINUED | OUTPATIENT
Start: 2023-05-23 | End: 2023-05-23 | Stop reason: SDUPTHER

## 2023-05-23 RX ORDER — DULOXETIN HYDROCHLORIDE 60 MG/1
60 CAPSULE, DELAYED RELEASE ORAL DAILY
Status: DISCONTINUED | OUTPATIENT
Start: 2023-05-23 | End: 2023-05-23

## 2023-05-23 RX ORDER — DULOXETIN HYDROCHLORIDE 60 MG/1
60 CAPSULE, DELAYED RELEASE ORAL DAILY
Status: DISCONTINUED | OUTPATIENT
Start: 2023-05-24 | End: 2023-05-24 | Stop reason: HOSPADM

## 2023-05-23 RX ORDER — METOPROLOL SUCCINATE 25 MG/1
25 TABLET, EXTENDED RELEASE ORAL ONCE
Status: COMPLETED | OUTPATIENT
Start: 2023-05-23 | End: 2023-05-23

## 2023-05-23 RX ORDER — FINASTERIDE 5 MG/1
5 TABLET, FILM COATED ORAL DAILY
Status: DISCONTINUED | OUTPATIENT
Start: 2023-05-24 | End: 2023-05-24 | Stop reason: HOSPADM

## 2023-05-23 RX ORDER — LEVOTHYROXINE SODIUM 0.07 MG/1
75 TABLET ORAL DAILY
Status: DISCONTINUED | OUTPATIENT
Start: 2023-05-24 | End: 2023-05-24 | Stop reason: HOSPADM

## 2023-05-23 RX ORDER — LIDOCAINE HYDROCHLORIDE 20 MG/ML
INJECTION, SOLUTION INTRAVENOUS PRN
Status: DISCONTINUED | OUTPATIENT
Start: 2023-05-23 | End: 2023-05-23 | Stop reason: SDUPTHER

## 2023-05-23 RX ORDER — IPRATROPIUM BROMIDE AND ALBUTEROL SULFATE 2.5; .5 MG/3ML; MG/3ML
1 SOLUTION RESPIRATORY (INHALATION)
Status: DISCONTINUED | OUTPATIENT
Start: 2023-05-23 | End: 2023-05-23 | Stop reason: HOSPADM

## 2023-05-23 RX ORDER — LEVOTHYROXINE SODIUM 0.07 MG/1
75 TABLET ORAL DAILY
Status: DISCONTINUED | OUTPATIENT
Start: 2023-05-23 | End: 2023-05-23

## 2023-05-23 RX ORDER — SODIUM CHLORIDE 9 MG/ML
25 INJECTION, SOLUTION INTRAVENOUS PRN
Status: DISCONTINUED | OUTPATIENT
Start: 2023-05-23 | End: 2023-05-23 | Stop reason: HOSPADM

## 2023-05-23 RX ORDER — TAMSULOSIN HYDROCHLORIDE 0.4 MG/1
0.4 CAPSULE ORAL 2 TIMES DAILY
Status: DISCONTINUED | OUTPATIENT
Start: 2023-05-23 | End: 2023-05-23

## 2023-05-23 RX ORDER — ATORVASTATIN CALCIUM 20 MG/1
20 TABLET, FILM COATED ORAL NIGHTLY
Status: DISCONTINUED | OUTPATIENT
Start: 2023-05-24 | End: 2023-05-24 | Stop reason: HOSPADM

## 2023-05-23 RX ORDER — OXYCODONE HYDROCHLORIDE 5 MG/1
10 TABLET ORAL EVERY 4 HOURS PRN
Status: DISCONTINUED | OUTPATIENT
Start: 2023-05-23 | End: 2023-05-24 | Stop reason: HOSPADM

## 2023-05-23 RX ORDER — FENTANYL CITRATE 50 UG/ML
INJECTION, SOLUTION INTRAMUSCULAR; INTRAVENOUS PRN
Status: DISCONTINUED | OUTPATIENT
Start: 2023-05-23 | End: 2023-05-23 | Stop reason: SDUPTHER

## 2023-05-23 RX ORDER — SODIUM CHLORIDE 0.9 % (FLUSH) 0.9 %
5-40 SYRINGE (ML) INJECTION EVERY 12 HOURS SCHEDULED
Status: DISCONTINUED | OUTPATIENT
Start: 2023-05-23 | End: 2023-05-23 | Stop reason: HOSPADM

## 2023-05-23 RX ORDER — EPHEDRINE SULFATE 50 MG/ML
INJECTION INTRAVENOUS PRN
Status: DISCONTINUED | OUTPATIENT
Start: 2023-05-23 | End: 2023-05-23 | Stop reason: SDUPTHER

## 2023-05-23 RX ORDER — FENTANYL CITRATE 50 UG/ML
25 INJECTION, SOLUTION INTRAMUSCULAR; INTRAVENOUS EVERY 5 MIN PRN
Status: DISCONTINUED | OUTPATIENT
Start: 2023-05-23 | End: 2023-05-23 | Stop reason: HOSPADM

## 2023-05-23 RX ORDER — OXYCODONE HYDROCHLORIDE 5 MG/1
5 TABLET ORAL EVERY 4 HOURS PRN
Status: DISCONTINUED | OUTPATIENT
Start: 2023-05-23 | End: 2023-05-24 | Stop reason: HOSPADM

## 2023-05-23 RX ORDER — SODIUM CHLORIDE 9 MG/ML
INJECTION, SOLUTION INTRAVENOUS PRN
Status: DISCONTINUED | OUTPATIENT
Start: 2023-05-23 | End: 2023-05-24 | Stop reason: HOSPADM

## 2023-05-23 RX ORDER — ATORVASTATIN CALCIUM 20 MG/1
20 TABLET, FILM COATED ORAL NIGHTLY
Status: DISCONTINUED | OUTPATIENT
Start: 2023-05-23 | End: 2023-05-23

## 2023-05-23 RX ORDER — PHENYLEPHRINE HYDROCHLORIDE 10 MG/ML
INJECTION INTRAVENOUS PRN
Status: DISCONTINUED | OUTPATIENT
Start: 2023-05-23 | End: 2023-05-23 | Stop reason: SDUPTHER

## 2023-05-23 RX ORDER — METOPROLOL SUCCINATE 25 MG/1
25 TABLET, EXTENDED RELEASE ORAL DAILY
Status: DISCONTINUED | OUTPATIENT
Start: 2023-05-23 | End: 2023-05-23

## 2023-05-23 RX ORDER — MEPERIDINE HYDROCHLORIDE 25 MG/ML
12.5 INJECTION INTRAMUSCULAR; INTRAVENOUS; SUBCUTANEOUS EVERY 5 MIN PRN
Status: DISCONTINUED | OUTPATIENT
Start: 2023-05-23 | End: 2023-05-23 | Stop reason: HOSPADM

## 2023-05-23 RX ORDER — FINASTERIDE 5 MG/1
5 TABLET, FILM COATED ORAL DAILY
Status: DISCONTINUED | OUTPATIENT
Start: 2023-05-23 | End: 2023-05-23

## 2023-05-23 RX ORDER — SODIUM CHLORIDE 0.9 % (FLUSH) 0.9 %
5-40 SYRINGE (ML) INJECTION PRN
Status: DISCONTINUED | OUTPATIENT
Start: 2023-05-23 | End: 2023-05-23 | Stop reason: HOSPADM

## 2023-05-23 RX ORDER — METOPROLOL SUCCINATE 25 MG/1
25 TABLET, EXTENDED RELEASE ORAL DAILY
Status: DISCONTINUED | OUTPATIENT
Start: 2023-05-24 | End: 2023-05-24 | Stop reason: HOSPADM

## 2023-05-23 RX ORDER — PROPOFOL 10 MG/ML
INJECTION, EMULSION INTRAVENOUS PRN
Status: DISCONTINUED | OUTPATIENT
Start: 2023-05-23 | End: 2023-05-23 | Stop reason: SDUPTHER

## 2023-05-23 RX ORDER — SODIUM CHLORIDE 0.9 % (FLUSH) 0.9 %
5-40 SYRINGE (ML) INJECTION PRN
Status: DISCONTINUED | OUTPATIENT
Start: 2023-05-23 | End: 2023-05-24 | Stop reason: HOSPADM

## 2023-05-23 RX ORDER — ACETAMINOPHEN 650 MG/1
650 SUPPOSITORY RECTAL
Status: DISCONTINUED | OUTPATIENT
Start: 2023-05-23 | End: 2023-05-23 | Stop reason: HOSPADM

## 2023-05-23 RX ORDER — LABETALOL HYDROCHLORIDE 5 MG/ML
10 INJECTION, SOLUTION INTRAVENOUS
Status: DISCONTINUED | OUTPATIENT
Start: 2023-05-23 | End: 2023-05-23 | Stop reason: HOSPADM

## 2023-05-23 RX ORDER — ACETAMINOPHEN 500 MG
1000 TABLET ORAL EVERY 6 HOURS
Status: DISCONTINUED | OUTPATIENT
Start: 2023-05-23 | End: 2023-05-24 | Stop reason: HOSPADM

## 2023-05-23 RX ORDER — SODIUM CHLORIDE, SODIUM LACTATE, POTASSIUM CHLORIDE, CALCIUM CHLORIDE 600; 310; 30; 20 MG/100ML; MG/100ML; MG/100ML; MG/100ML
INJECTION, SOLUTION INTRAVENOUS CONTINUOUS
Status: DISCONTINUED | OUTPATIENT
Start: 2023-05-23 | End: 2023-05-23 | Stop reason: HOSPADM

## 2023-05-23 RX ORDER — PROCHLORPERAZINE EDISYLATE 5 MG/ML
5 INJECTION INTRAMUSCULAR; INTRAVENOUS
Status: DISCONTINUED | OUTPATIENT
Start: 2023-05-23 | End: 2023-05-23 | Stop reason: HOSPADM

## 2023-05-23 RX ORDER — METRONIDAZOLE 500 MG/100ML
500 INJECTION, SOLUTION INTRAVENOUS ONCE
Status: COMPLETED | OUTPATIENT
Start: 2023-05-23 | End: 2023-05-23

## 2023-05-23 RX ORDER — ALBUTEROL SULFATE 2.5 MG/3ML
2.5 SOLUTION RESPIRATORY (INHALATION) EVERY 6 HOURS PRN
Status: DISCONTINUED | OUTPATIENT
Start: 2023-05-23 | End: 2023-05-24 | Stop reason: HOSPADM

## 2023-05-23 RX ADMIN — PHENYLEPHRINE HYDROCHLORIDE 100 MCG: 10 INJECTION INTRAVENOUS at 12:09

## 2023-05-23 RX ADMIN — OXYCODONE 5 MG: 5 TABLET ORAL at 15:33

## 2023-05-23 RX ADMIN — FENTANYL CITRATE 25 MCG: 50 INJECTION, SOLUTION INTRAMUSCULAR; INTRAVENOUS at 12:47

## 2023-05-23 RX ADMIN — EPHEDRINE SULFATE 10 MG: 50 INJECTION INTRAVENOUS at 12:23

## 2023-05-23 RX ADMIN — METOPROLOL SUCCINATE 25 MG: 25 TABLET, EXTENDED RELEASE ORAL at 10:56

## 2023-05-23 RX ADMIN — PHENYLEPHRINE HYDROCHLORIDE 100 MCG: 10 INJECTION INTRAVENOUS at 12:15

## 2023-05-23 RX ADMIN — EPHEDRINE SULFATE 10 MG: 50 INJECTION INTRAVENOUS at 12:21

## 2023-05-23 RX ADMIN — SODIUM CHLORIDE, POTASSIUM CHLORIDE, SODIUM LACTATE AND CALCIUM CHLORIDE: 600; 310; 30; 20 INJECTION, SOLUTION INTRAVENOUS at 10:39

## 2023-05-23 RX ADMIN — CLINDAMYCIN PHOSPHATE 900 MG: 900 INJECTION, SOLUTION INTRAVENOUS at 11:30

## 2023-05-23 RX ADMIN — FENTANYL CITRATE 50 MCG: 50 INJECTION, SOLUTION INTRAMUSCULAR; INTRAVENOUS at 11:26

## 2023-05-23 RX ADMIN — EPHEDRINE SULFATE 10 MG: 50 INJECTION INTRAVENOUS at 13:02

## 2023-05-23 RX ADMIN — PHENYLEPHRINE HYDROCHLORIDE 100 MCG: 10 INJECTION INTRAVENOUS at 11:54

## 2023-05-23 RX ADMIN — HYDROMORPHONE HYDROCHLORIDE 1 MG: 1 INJECTION, SOLUTION INTRAMUSCULAR; INTRAVENOUS; SUBCUTANEOUS at 23:33

## 2023-05-23 RX ADMIN — PHENYLEPHRINE HYDROCHLORIDE 200 MCG: 10 INJECTION INTRAVENOUS at 12:14

## 2023-05-23 RX ADMIN — OXYCODONE 5 MG: 5 TABLET ORAL at 22:30

## 2023-05-23 RX ADMIN — METRONIDAZOLE 500 MG: 500 INJECTION, SOLUTION INTRAVENOUS at 11:34

## 2023-05-23 RX ADMIN — PHENYLEPHRINE HYDROCHLORIDE 100 MCG: 10 INJECTION INTRAVENOUS at 12:46

## 2023-05-23 RX ADMIN — SODIUM CHLORIDE, SODIUM LACTATE, POTASSIUM CHLORIDE, AND CALCIUM CHLORIDE: .6; .31; .03; .02 INJECTION, SOLUTION INTRAVENOUS at 12:51

## 2023-05-23 RX ADMIN — SODIUM CHLORIDE, SODIUM LACTATE, POTASSIUM CHLORIDE, AND CALCIUM CHLORIDE: .6; .31; .03; .02 INJECTION, SOLUTION INTRAVENOUS at 11:17

## 2023-05-23 RX ADMIN — FENTANYL CITRATE 25 MCG: 50 INJECTION, SOLUTION INTRAMUSCULAR; INTRAVENOUS at 12:36

## 2023-05-23 RX ADMIN — ACETAMINOPHEN 1000 MG: 500 TABLET ORAL at 22:30

## 2023-05-23 RX ADMIN — LIDOCAINE HYDROCHLORIDE 100 MG: 20 INJECTION, SOLUTION INTRAVENOUS at 11:28

## 2023-05-23 RX ADMIN — PHENYLEPHRINE HYDROCHLORIDE 100 MCG: 10 INJECTION INTRAVENOUS at 11:59

## 2023-05-23 RX ADMIN — SODIUM CHLORIDE, PRESERVATIVE FREE 10 ML: 5 INJECTION INTRAVENOUS at 23:33

## 2023-05-23 RX ADMIN — FENTANYL CITRATE 50 MCG: 50 INJECTION, SOLUTION INTRAMUSCULAR; INTRAVENOUS at 11:44

## 2023-05-23 RX ADMIN — PROPOFOL 150 MG: 10 INJECTION, EMULSION INTRAVENOUS at 11:28

## 2023-05-23 RX ADMIN — FENTANYL CITRATE 50 MCG: 50 INJECTION, SOLUTION INTRAMUSCULAR; INTRAVENOUS at 13:00

## 2023-05-23 RX ADMIN — ACETAMINOPHEN 1000 MG: 500 TABLET ORAL at 15:34

## 2023-05-23 ASSESSMENT — PAIN DESCRIPTION - LOCATION
LOCATION: BUTTOCKS
LOCATION: BUTTOCKS

## 2023-05-23 ASSESSMENT — PAIN - FUNCTIONAL ASSESSMENT
PAIN_FUNCTIONAL_ASSESSMENT: 0-10
PAIN_FUNCTIONAL_ASSESSMENT_SITE2: PREVENTS OR INTERFERES SOME ACTIVE ACTIVITIES AND ADLS
PAIN_FUNCTIONAL_ASSESSMENT: PREVENTS OR INTERFERES SOME ACTIVE ACTIVITIES AND ADLS
PAIN_FUNCTIONAL_ASSESSMENT: PREVENTS OR INTERFERES SOME ACTIVE ACTIVITIES AND ADLS

## 2023-05-23 ASSESSMENT — PAIN DESCRIPTION - PAIN TYPE
TYPE: SURGICAL PAIN
TYPE: SURGICAL PAIN

## 2023-05-23 ASSESSMENT — PAIN DESCRIPTION - ONSET
ONSET: GRADUAL
ONSET: GRADUAL

## 2023-05-23 ASSESSMENT — PAIN DESCRIPTION - ORIENTATION
ORIENTATION: INNER
ORIENTATION: INNER

## 2023-05-23 ASSESSMENT — PAIN SCALES - GENERAL
PAINLEVEL_OUTOF10: 5
PAINLEVEL_OUTOF10: 1
PAINLEVEL_OUTOF10: 7
PAINLEVEL_OUTOF10: 5
PAINLEVEL_OUTOF10: 5

## 2023-05-23 ASSESSMENT — PAIN DESCRIPTION - FREQUENCY
FREQUENCY: INTERMITTENT
FREQUENCY: INTERMITTENT

## 2023-05-23 ASSESSMENT — PAIN DESCRIPTION - DESCRIPTORS
DESCRIPTORS: ACHING
DESCRIPTORS: STABBING
DESCRIPTORS: STABBING

## 2023-05-23 NOTE — PROGRESS NOTES
From OR drowsy, denies any pain at this time, dressing with mesh panty and peripad CDI at this time, /65, other VSS. Report from Dr. Rahel Goins, CRNA and OR RN.     S/P TRANSANAL EXCISION OF RECTAL CANCER

## 2023-05-23 NOTE — OP NOTE
Operative Note      Patient: Zoya Barth  YOB: 1935  MRN: [id number]    Date of Procedure: 5/23/2023    Pre-Op Diagnosis Codes:     * Rectal cancer ([facility] [address] [C20]    Post-Op Diagnosis: Same       Procedure(s):  TRANSANAL EXCISION OF RECTAL CANCER, FULL THICKNESS    Surgeon(s):  Lisa Muñoz MD    Assistant:   Nai Islas MD PGY 1     Anesthesia: General    Estimated Blood Loss (mL): less than 051     Complications: None    Specimens:   ID Type Source Tests Collected by Time Destination   A : A. RECTAL CANCER Tissue Tissue SURGICAL PATHOLOGY Lisa Muñoz MD 5/23/2023 1302          Findings: See full note       Detailed Description of Procedure:     After informed consent was obtained the patient was taken to the operating room. General anesthesia was given. The patient was placed in the lithotomy position with appropriate padding. Care was taken to make sure his gentials were protected. The patient received oral antibiotic and mechanical bowel prep preoperatively. We irrigated out the rectum preoperatively. The patient was prepped and draped in the usual sterile fashion. We then identified the tumor on anoscopy. We placed an operating proctoscope. The cancer appeared large, ulcerated and tethered. Given our preoperative discussion with the patient and his family we proceeded with transanal excision. Under normal circumstances we would not have offered this but given his age and co-moridity he did not want a larger resection (APR) or chemotherapy and radiation. It was about 3-4 cm in size. It was around 3 cm from the verge. The polyp was in the right/anterior position. We then marked the edges of the polyp using cautery with a small margin of healthy rectum. The cancer was removed down to perirectal fat using the bovie. Given the ulceration it was impossible to remove this cancer en bloc and we ended up removing it in several fragments.  One small bleeding vessel was controlled with two

## 2023-05-23 NOTE — PROGRESS NOTES
4 Eyes Skin Assessment       NAME:  Geo Syed  YOB: 1935  MEDICAL RECORD NUMBER:  [id number]       The patient is being assessed for   Admission       I agree that two RN has performed a thorough Head to Toe Skin Assessment on the patient. ALL assessment sites listed below have been assessed. Areas assessed by both nurses:       Head, Face, Ears, Shoulders, Back, Chest, Arms, Elbows, Hands, Sacrum. Buttock, Coccyx, Ischium, and Legs. Feet and Heels                               Does the Patient have a Wound? Yes wound(s) were present on assessment.  LDA wound assessment was Initiated and completed by RN transanal excision of rectal cancer       Kristian Prevention initiated by RN: Yes   Wound Care Orders initiated by RN: NA       Pressure Injury (Stage 3,4, Unstageable, DTI, NWPT, and Complex wounds) if present, place referral order by RN under : NA       New and Established Ostomies, if present place, referral order under : NA        Nurse 1 eSignature: Electronically signed by Bryan Burgos RN on 5/23/23 at 5:13 PM EDT       **SHARE this note so that the co-signing nurse can place an eSignature**       Nurse 2 eSignature: {Esignature:[id number]}

## 2023-05-23 NOTE — ANESTHESIA POSTPROCEDURE EVALUATION
Department of Anesthesiology  Postprocedure Note    Patient: Horacio Boss  MRN: [id number]  YOB: 1935  Date of evaluation: 5/23/2023      Procedure Summary     Date: 05/23/23 Room / Location: [place] [address] / [facility]    Anesthesia Start: 1121 Anesthesia Stop: 9562    Procedure: TRANSANAL EXCISION OF RECTAL CANCER Diagnosis:       Rectal cancer ([facility] [address]      (Rectal cancer ([facility] [address] Yaron Leahy)    Surgeons: Gifty Johnston MD Responsible Provider: Runell Kanner, MD    Anesthesia Type: general ASA Status: 3          Anesthesia Type: No value filed.     Ara Phase I: Ara Score: 10    Ara Phase II:        Anesthesia Post Evaluation    Patient location during evaluation: PACU  Level of consciousness: awake  Complications: no  Multimodal analgesia pain management approach

## 2023-05-23 NOTE — OP NOTE
Operative Note  Patient is a 82 y.o. man with rectal cancer     The patient is referred by Dr. Tonia Langston MD Results of consultation will be shared via electronic medical record     HPI: Recent surveillance colonoscopy showed a rectal cancer just a few centimeters above the anal verge. He was not having any symptoms. No FH of colon cancer. No pain today. Denies prostate cancer.  Had very difficult recovery after TURP          Patient Active Problem List     Diagnosis Date Noted    Chest pain 07/15/2011    CAD (coronary artery disease)      Obesity      Lumbar facet arthropathy 04/17/2019    Spinal stenosis of lumbar region 04/17/2019    Disc displacement, lumbar 04/17/2019    Cervical disc displacement 04/17/2019    Osteoarthritis of cervical spine 04/17/2019    Cervical stenosis of spinal canal 04/17/2019    Cervical radiculopathy, chronic 11/30/2018    Cervical stenosis of spine 11/30/2018    Cervical spondylosis without myelopathy 11/30/2018    Displacement of lumbar intervertebral disc without myelopathy 11/07/2018    Degeneration of lumbar or lumbosacral intervertebral disc 11/07/2018    Spinal stenosis, lumbar region, without neurogenic claudication 11/07/2018    Lumbosacral spondylosis without myelopathy 11/07/2018    Primary insomnia 09/04/2018    Elevated lactic acid level 08/17/2018    Acute encephalopathy      SERA (acute kidney injury) (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

## 2023-05-23 NOTE — ANESTHESIA PRE PROCEDURE
Department of Anesthesiology  Preprocedure Note       Name:  Michelle Mccormick   Age:  82 y.o.  :  1935                                          MRN:  [id number]         Date:  2023      Surgeon: Tim Hopkins):  Wendy Urbina MD    Procedure: Procedure(s):  TRANSANAL EXCISION OF RECTAL CANCER    Medications prior to admission:   Prior to Admission medications    Medication Sig Start Date End Date Taking? Authorizing Provider   ondansetron (ZOFRAN-ODT) 4 MG disintegrating tablet Place 1 tablet under the tongue 3 times daily as needed for Nausea or Vomiting 5/3/23   Wendy Urbina MD   metroNIDAZOLE (FLAGYL) 500 MG tablet Take one tablet by mouth 3 times on the day prior to surgery. Take one tablet at 1pm, 2pm and 9pm. 5/3/23   Wendy Urbina MD   neomycin (MYCIFRADIN) 500 MG tablet Take two tablets 3 times the day before surgery. Take two tablets at 1pm, two tablets at 2pm and two tablets at 9pm. 5/3/23   Wendy Urbina MD   DULoxetine (CYMBALTA) 60 MG extended release capsule Take 1 capsule by mouth daily 3/26/22   Historical Provider, MD   oxyCODONE-acetaminophen (PERCOCET) 5-325 MG per tablet Take 1 tablet by mouth 3 times daily for 30 days. 23  Finn Prince MD   naloxone 4 MG/0.1ML LIQD nasal spray 1 spray by Nasal route as needed for Opioid Reversal 19   Jeremy Bland APRN - CNP   diclofenac sodium (VOLTAREN) 1 % GEL APPLY 4 GRAMS TOPICALLY FOUR TIMES DAILY  Patient not taking: Reported on 2023   Finn Prince MD   Tens Unit MISC by Does not apply route 10/10/18   Paolo Hills PA-C   torsemide (DEMADEX) 10 MG tablet TAKE 1 TAB BY MOUTH DAILY.   Patient not taking: Reported on 2023   Historical Provider, MD   metoprolol succinate (TOPROL XL) 25 MG extended release tablet Take 1 tablet by mouth daily 18   Shekhar Johns MD   albuterol sulfate  (90 BASE) MCG/ACT inhaler Inhale 2 puffs into the lungs every 6 hours as needed for

## 2023-05-23 NOTE — H&P
appears stated age  PSYCH: normal mood, normal affect  NECK: no neck masses, trachea midline  ENT: moist oral mucosa; anicteric  SKIN: no rash or jaundice  CV: regular heart rate and rhythm  PULM: normal respiratory effort, no wheezing  GI: soft non tender abdomen. Normal bowel sounds  RECTAL: examined with chaperone Sanjana Vasquez RN. Rectal mass along right side. Feels tethered but above level of sphincter complex   EXT/NEURO: normal gait, strength/sensation grossly intact in all extremities     MRI report 3-: Images reviewed. Tumor abuts sphincter     \"  1. Stage: T 1/2, N 0. The lower rectal tumor appears sessile in nature without definite extension beyond the muscularis. 2. MRF: Threatened. There is less than 1 mm between the lower rectum and the mesorectal fascia due to the location of the tumor. 3. Sphincter involvement: The lowest aspect of the tumor appears to involve the uppermost portion of the internal sphincter. 4. Suspicious extra mesorectal nodes: None   5. EMVI: None           Comment: Diverticulosis. \"     CT chest 3-:     \"      Subcentimeter pulmonary nodules measuring up to 5 mm. These are indeterminate and may represent noncalcified granulomas. Follow-up in 4-6 months is recommended. Small left pleural effusion. Incompletely visualized, age-indeterminate compression fracture of L2. Gallstones. \"     CT A/P completed at [facility] : 2-     \"  IMPRESSION:   1. No evidence of any abdominal or pelvic metastatic disease. 2. Descending colon and sigmoid colon diverticulosis. 3. Small bowel diverticulosis. 4. Cholelithiasis. 5. Degenerative changes at the L3-L4 result in severe central canal stenosis. Correlate for any neurogenic claudication.        \"     Mismatch repair intact:      Dr. Gil Lawson note and pathology are scanned into media      I don't see CEA, may have been drawn at [facility]     Assessment:      Rectal cancer      Plan:      Discussed

## 2023-05-23 NOTE — RT PROTOCOL NOTE
RT Inhaler-Nebulizer Bronchodilator Protocol Note    There is a bronchodilator order in the chart from a provider indicating to follow the RT Bronchodilator Protocol and there is an Initiate RT Inhaler-Nebulizer Bronchodilator Protocol order as well (see protocol at bottom of note). CXR Findings:  No results found. The findings from the last RT Protocol Assessment were as follows:   History Pulmonary Disease: Chronic pulmonary disease  Respiratory Pattern: Regular pattern and RR 12-20 bpm  Breath Sounds: Clear breath sounds  Cough: Strong, spontaneous, non-productive  Indication for Bronchodilator Therapy: Decreased or absent breath sounds  Bronchodilator Assessment Score: 2    Aerosolized bronchodilator medication orders have been revised according to the RT Inhaler-Nebulizer Bronchodilator Protocol below. Respiratory Therapist to perform RT Therapy Protocol Assessment initially then follow the protocol. Repeat RT Therapy Protocol Assessment PRN for score 0-3 or on second treatment, BID, and PRN for scores above 3. No Indications - adjust the frequency to every 6 hours PRN wheezing or bronchospasm, if no treatments needed after 48 hours then discontinue using Per Protocol order mode. If indication present, adjust the RT bronchodilator orders based on the Bronchodilator Assessment Score as indicated below. Use Inhaler orders unless patient has one or more of the following: on home nebulizer, not able to hold breath for 10 seconds, is not alert and oriented, cannot activate and use MDI correctly, or respiratory rate 25 breaths per minute or more, then use the equivalent nebulizer order(s) with same Frequency and PRN reasons based on the score. If a patient is on this medication at home then do not decrease Frequency below that used at home.     0-3 - enter or revise RT bronchodilator order(s) to equivalent RT Bronchodilator order with Frequency of every 4 hours PRN for wheezing or increased work

## 2023-05-23 NOTE — PROGRESS NOTES
PACU Transfer to [facility]    Vitals:    05/23/23 1445   BP: (!) 130/56   Pulse: 87   Resp: 11   Temp: 97.5 °F (36.4 °C)   SpO2: 99%     BP meets Phase one dc criteria    Intake/Output Summary (Last 24 hours) at 5/23/2023 1512  Last data filed at 5/23/2023 1343  Gross per 24 hour   Intake 1350 ml   Output --   Net 1350 ml       Pain assessment:  present - adequately treated  Pain Level:  (Pt rates pain as tolerable)    Patient transferred to care of [facility] RN.    5/23/2023 3:12 PM

## 2023-05-23 NOTE — PLAN OF CARE
Problem: Discharge Planning  Goal: Discharge to home or other facility with appropriate resources  Outcome: Progressing  Flowsheets (Taken 5/23/2023 1819)  Discharge to home or other facility with appropriate resources: Identify barriers to discharge with patient and caregiver     Problem: Pain  Goal: Verbalizes/displays adequate comfort level or baseline comfort level  Outcome: Progressing  Flowsheets (Taken 5/23/2023 1819)  Verbalizes/displays adequate comfort level or baseline comfort level:   Encourage patient to monitor pain and request assistance   Assess pain using appropriate pain scale  Note: Reports pain management effective     Problem: Safety - Adult  Goal: Free from fall injury  Outcome: Progressing  Flowsheets (Taken 5/23/2023 1819)  Free From Fall Injury: Instruct family/caregiver on patient safety  Note: Pt agrees to make needs known with call llight

## 2023-05-23 NOTE — BRIEF OP NOTE
Brief Postoperative Note      Patient: Mae Aceves  YOB: 1935  MRN: [id number]    Date of Procedure: 5/23/2023    Pre-Op Diagnosis Codes:     * Rectal cancer ([facility] [address] [C20]    Post-Op Diagnosis: Same       Procedure(s):  TRANSANAL EXCISION OF RECTAL CANCER    Surgeon(s):  Crys Head MD    Assistant:  * No surgical staff found *    Anesthesia: General    Estimated Blood Loss (mL): less than 296     Complications: None    Specimens:   ID Type Source Tests Collected by Time Destination   A : A. RECTAL CANCER Tissue Tissue SURGICAL PATHOLOGY Crys Head MD 5/23/2023 1302        Implants:  * No implants in log *      Drains: * No LDAs found *    Findings: Transanal excision of rectal cancer. Placement of clips . Gel foam placed. Dressed with fluffs, ABD, mesh briefs.    This procedure was not performed to treat primary cutaneous melanoma through wide local excision      Electronically signed by Jamir Dos Santos MD on 5/23/2023 at 1:49 PM

## 2023-05-24 VITALS
RESPIRATION RATE: 18 BRPM | TEMPERATURE: 97.8 F | HEIGHT: 70 IN | DIASTOLIC BLOOD PRESSURE: 57 MMHG | SYSTOLIC BLOOD PRESSURE: 107 MMHG | OXYGEN SATURATION: 95 % | WEIGHT: 224.4 LBS | BODY MASS INDEX: 32.13 KG/M2 | HEART RATE: 97 BPM

## 2023-05-24 LAB
ALBUMIN SERPL-MCNC: 3.6 G/DL (ref 3.4–5)
ANION GAP SERPL CALCULATED.3IONS-SCNC: 10 MMOL/L (ref 3–16)
BASOPHILS # BLD: 0 K/UL (ref 0–0.2)
BASOPHILS NFR BLD: 0.2 %
BUN SERPL-MCNC: 9 MG/DL (ref 7–20)
CALCIUM SERPL-MCNC: 8.2 MG/DL (ref 8.3–10.6)
CHLORIDE SERPL-SCNC: 96 MMOL/L (ref 99–110)
CO2 SERPL-SCNC: 26 MMOL/L (ref 21–32)
CREAT SERPL-MCNC: 1 MG/DL (ref 0.8–1.3)
DEPRECATED RDW RBC AUTO: 14.5 % (ref 12.4–15.4)
EOSINOPHIL # BLD: 0.1 K/UL (ref 0–0.6)
EOSINOPHIL NFR BLD: 0.4 %
GFR SERPLBLD CREATININE-BSD FMLA CKD-EPI: >60 ML/MIN/{1.73_M2}
GLUCOSE SERPL-MCNC: 107 MG/DL (ref 70–99)
HCT VFR BLD AUTO: 35.5 % (ref 40.5–52.5)
HGB BLD-MCNC: 11.6 G/DL (ref 13.5–17.5)
LYMPHOCYTES # BLD: 0.5 K/UL (ref 1–5.1)
LYMPHOCYTES NFR BLD: 4.6 %
MAGNESIUM SERPL-MCNC: 1 MG/DL (ref 1.8–2.4)
MCH RBC QN AUTO: 29.3 PG (ref 26–34)
MCHC RBC AUTO-ENTMCNC: 32.8 G/DL (ref 31–36)
MCV RBC AUTO: 89.2 FL (ref 80–100)
MONOCYTES # BLD: 1.2 K/UL (ref 0–1.3)
MONOCYTES NFR BLD: 10.2 %
NEUTROPHILS # BLD: 10.1 K/UL (ref 1.7–7.7)
NEUTROPHILS NFR BLD: 84.6 %
PHOSPHATE SERPL-MCNC: 4 MG/DL (ref 2.5–4.9)
PLATELET # BLD AUTO: 200 K/UL (ref 135–450)
PMV BLD AUTO: 8.5 FL (ref 5–10.5)
POTASSIUM SERPL-SCNC: 4.4 MMOL/L (ref 3.5–5.1)
RBC # BLD AUTO: 3.98 M/UL (ref 4.2–5.9)
SODIUM SERPL-SCNC: 132 MMOL/L (ref 136–145)
WBC # BLD AUTO: 11.9 K/UL (ref 4–11)

## 2023-05-24 PROCEDURE — 6370000000 HC RX 637 (ALT 250 FOR IP)

## 2023-05-24 PROCEDURE — 2580000003 HC RX 258: Performed by: STUDENT IN AN ORGANIZED HEALTH CARE EDUCATION/TRAINING PROGRAM

## 2023-05-24 PROCEDURE — G0378 HOSPITAL OBSERVATION PER HR: HCPCS

## 2023-05-24 PROCEDURE — 96366 THER/PROPH/DIAG IV INF ADDON: CPT

## 2023-05-24 PROCEDURE — 96376 TX/PRO/DX INJ SAME DRUG ADON: CPT

## 2023-05-24 PROCEDURE — 2500000003 HC RX 250 WO HCPCS: Performed by: STUDENT IN AN ORGANIZED HEALTH CARE EDUCATION/TRAINING PROGRAM

## 2023-05-24 PROCEDURE — 51701 INSERT BLADDER CATHETER: CPT

## 2023-05-24 PROCEDURE — 83735 ASSAY OF MAGNESIUM: CPT

## 2023-05-24 PROCEDURE — 85025 COMPLETE CBC W/AUTO DIFF WBC: CPT

## 2023-05-24 PROCEDURE — 96365 THER/PROPH/DIAG IV INF INIT: CPT

## 2023-05-24 PROCEDURE — 6370000000 HC RX 637 (ALT 250 FOR IP): Performed by: STUDENT IN AN ORGANIZED HEALTH CARE EDUCATION/TRAINING PROGRAM

## 2023-05-24 PROCEDURE — 96375 TX/PRO/DX INJ NEW DRUG ADDON: CPT

## 2023-05-24 PROCEDURE — 51798 US URINE CAPACITY MEASURE: CPT

## 2023-05-24 PROCEDURE — 6360000002 HC RX W HCPCS: Performed by: STUDENT IN AN ORGANIZED HEALTH CARE EDUCATION/TRAINING PROGRAM

## 2023-05-24 PROCEDURE — 36415 COLL VENOUS BLD VENIPUNCTURE: CPT

## 2023-05-24 PROCEDURE — 80069 RENAL FUNCTION PANEL: CPT

## 2023-05-24 RX ORDER — OXYCODONE HYDROCHLORIDE 5 MG/1
5 TABLET ORAL EVERY 6 HOURS PRN
Qty: 20 TABLET | Refills: 0 | Status: SHIPPED | OUTPATIENT
Start: 2023-05-24 | End: 2023-05-29

## 2023-05-24 RX ORDER — DOCUSATE SODIUM 100 MG/1
100 CAPSULE, LIQUID FILLED ORAL 2 TIMES DAILY
Qty: 28 CAPSULE | Refills: 0 | Status: SHIPPED | OUTPATIENT
Start: 2023-05-24 | End: 2023-06-07

## 2023-05-24 RX ADMIN — SODIUM CHLORIDE: 9 INJECTION, SOLUTION INTRAVENOUS at 11:23

## 2023-05-24 RX ADMIN — ACETAMINOPHEN 1000 MG: 500 TABLET ORAL at 08:53

## 2023-05-24 RX ADMIN — ACETAMINOPHEN 1000 MG: 500 TABLET ORAL at 01:32

## 2023-05-24 RX ADMIN — TAMSULOSIN HYDROCHLORIDE 0.4 MG: 0.4 CAPSULE ORAL at 08:53

## 2023-05-24 RX ADMIN — HYDROMORPHONE HYDROCHLORIDE 1 MG: 1 INJECTION, SOLUTION INTRAMUSCULAR; INTRAVENOUS; SUBCUTANEOUS at 03:21

## 2023-05-24 RX ADMIN — ACETAMINOPHEN 1000 MG: 500 TABLET ORAL at 13:14

## 2023-05-24 RX ADMIN — METOPROLOL SUCCINATE 25 MG: 25 TABLET, EXTENDED RELEASE ORAL at 08:53

## 2023-05-24 RX ADMIN — MAGNESIUM SULFATE HEPTAHYDRATE 6000 MG: 500 INJECTION, SOLUTION INTRAMUSCULAR; INTRAVENOUS at 11:30

## 2023-05-24 RX ADMIN — DULOXETINE 60 MG: 60 CAPSULE, DELAYED RELEASE ORAL at 08:53

## 2023-05-24 RX ADMIN — OXYCODONE 10 MG: 5 TABLET ORAL at 13:14

## 2023-05-24 RX ADMIN — ONDANSETRON 4 MG: 2 INJECTION INTRAMUSCULAR; INTRAVENOUS at 08:47

## 2023-05-24 RX ADMIN — FINASTERIDE 5 MG: 5 TABLET, FILM COATED ORAL at 08:53

## 2023-05-24 RX ADMIN — OXYCODONE 10 MG: 5 TABLET ORAL at 08:51

## 2023-05-24 RX ADMIN — OXYCODONE 5 MG: 5 TABLET ORAL at 02:25

## 2023-05-24 RX ADMIN — TAMSULOSIN HYDROCHLORIDE 0.4 MG: 0.4 CAPSULE ORAL at 00:14

## 2023-05-24 RX ADMIN — SODIUM CHLORIDE, PRESERVATIVE FREE 10 ML: 5 INJECTION INTRAVENOUS at 08:55

## 2023-05-24 RX ADMIN — LEVOTHYROXINE SODIUM 75 MCG: 75 TABLET ORAL at 06:47

## 2023-05-24 ASSESSMENT — PAIN DESCRIPTION - DESCRIPTORS
DESCRIPTORS: STABBING
DESCRIPTORS: STABBING

## 2023-05-24 ASSESSMENT — PAIN - FUNCTIONAL ASSESSMENT
PAIN_FUNCTIONAL_ASSESSMENT: PREVENTS OR INTERFERES SOME ACTIVE ACTIVITIES AND ADLS
PAIN_FUNCTIONAL_ASSESSMENT: PREVENTS OR INTERFERES SOME ACTIVE ACTIVITIES AND ADLS

## 2023-05-24 ASSESSMENT — PAIN DESCRIPTION - ONSET
ONSET: GRADUAL
ONSET: GRADUAL

## 2023-05-24 ASSESSMENT — PAIN DESCRIPTION - ORIENTATION
ORIENTATION: INNER
ORIENTATION: INNER

## 2023-05-24 ASSESSMENT — PAIN SCALES - GENERAL
PAINLEVEL_OUTOF10: 5
PAINLEVEL_OUTOF10: 7
PAINLEVEL_OUTOF10: 7
PAINLEVEL_OUTOF10: 5
PAINLEVEL_OUTOF10: 4
PAINLEVEL_OUTOF10: 5
PAINLEVEL_OUTOF10: 7

## 2023-05-24 ASSESSMENT — PAIN DESCRIPTION - FREQUENCY
FREQUENCY: INTERMITTENT
FREQUENCY: INTERMITTENT

## 2023-05-24 ASSESSMENT — PAIN DESCRIPTION - PAIN TYPE
TYPE: SURGICAL PAIN
TYPE: SURGICAL PAIN

## 2023-05-24 ASSESSMENT — PAIN DESCRIPTION - LOCATION
LOCATION: BUTTOCKS
LOCATION: BUTTOCKS

## 2023-05-24 NOTE — PROGRESS NOTES
Surgery Daily Progress Note  Mayford Seip  CC:  Rectal CA    Subjective : No acute events overnight. HDS, afebrile. Post op urine retention. Home flomax restarted. Pain well controlled. Objective    Infusions:   sodium chloride          I/O:I/O last 3 completed shifts: In: 1350 [I.V.:1200; IV Piggyback:150]  Out: -            Wt Readings from Last 1 Encounters:   05/23/23 224 lb 6.4 oz (101.8 kg)                 LABS:  No results for input(s): WBC, HGB, HCT, MCV, PLT in the last 72 hours. No results for input(s): NA, K, CL, CO2, PHOS, BUN, CREATININE, CA in the last 72 hours. No results for input(s): AST, ALT, ALB, BILIDIR, BILITOT, ALKPHOS in the last 72 hours. No results for input(s): LIPASE, AMYLASE in the last 72 hours. No results for input(s): PROT, INR, APTT in the last 72 hours. No results for input(s): CKTOTAL, CKMB, CKMBINDEX, TROPONINI in the last 72 hours. Exam:BP (!) 115/55   Pulse (!) 108   Temp 98.6 °F (37 °C) (Oral)   Resp 20   Ht 5' 10\" (1.778 m)   Wt 224 lb 6.4 oz (101.8 kg)   SpO2 97%   BMI 32.20 kg/m²   General appearance: alert, no acute distress, grooming appropriate  Eyes: No scleral icterus, EOM grossly intact  Neck: trachea midline, no JVD, no lymphadenopathy, neck supple  Chest/Lungs: Audible wheezes, normal effort with no accessory muscle use on 2L NC  Cardiovascular: RRR, no murmurs/gallops/rubs, S1 and S2 noted, well perfused  Abdomen: Soft, non-tender, non-distended, no rebound, guarding, or rigidity. Skin: warm and dry, no rashes  Extremities: no edema, no cyanosis  Neuro: A&Ox3, no focal deficits, sensation intact    ASSESSMENT/PLAN: Pt. is a 82 y.o. male s/p transanal excision of rectal CA. POD 1    - Cont diet  - Cont pain meds  - pt required straight cath this AM, Will be a void check by 1400.  Pt may need english at DC with follow up with urology  - OOB/ambulate  - DC later today    Alonzo Terrazas, CNP  5/24/2023  6:29 AM  perfectserve    I

## 2023-05-24 NOTE — DISCHARGE INSTR - COC
Continuity of Care Form    Patient Name: Ki Riggs   :  1935  MRN:  [id number]    Admit date:  2023  Discharge date:  2023      Code Status Order: Full Code   Advance Directives:   [facility] Documentation       Date/Time Healthcare Directive Type of Healthcare Directive Copy in [address] Agent's Name Healthcare Agent's Phone Number    23 1022 Yes, patient has an advance directive for healthcare treatment Living will No, copy requested from family Spouse -- --            Admitting Physician:  Ashley Vicente MD  PCP: Bautista Drake MD    Discharging Nurse: Nurse Gerber Hester, RN  [address] Unit/Room#: 0058/8671-38  Discharging Unit Phone Number: [phone]    Emergency Contact:   Extended Emergency Contact Information  Primary Emergency Contact: Mónica Araujo [facility] Phone: [phone]  Work Phone: [phone]  Mobile Phone: [phone]  Relation: Child  Secondary Emergency Contact: Pham See  Address: [address] [place] [address],[address]           [address] Phone: [phone]  Mobile Phone: [phone]  Relation: Spouse    Past Surgical History:  Past Surgical History:   Procedure Laterality Date    ANUS SURGERY N/A 2023    TRANSANAL EXCISION OF RECTAL CANCER performed by Ashley Vicente MD at [facility]  2017    lamenectomy, facetotomy, decompression l2-3, l3-4, l4-5    CATARACT REMOVAL Left 2016    CATARACT REMOVAL WITH IMPLANT Right     COLONOSCOPY      COLONOSCOPY  2011    polyps    COLONOSCOPY  2014    polyps    CORONARY ANGIOPLASTY WITH STENT PLACEMENT      CYST REMOVAL  2014    central back    FINGER SURGERY      GROWTH ON FIRST FINGER REMOVED    FRACTURE SURGERY  age 14    nose    HIP SURGERY Left 2017    LEFT LATERAL TOTAL HIP REPLACEMENT WITH CELLSAVER AND    JOINT

## 2023-05-24 NOTE — PLAN OF CARE
Problem: Discharge Planning  Goal: Discharge to home or other facility with appropriate resources  5/23/2023 1819 by Ranjit Rdz RN  Outcome: Progressing  Flowsheets (Taken 5/23/2023 1819)  Discharge to home or other facility with appropriate resources: Identify barriers to discharge with patient and caregiver     Problem: Pain  Goal: Verbalizes/displays adequate comfort level or baseline comfort level  5/24/2023 0659 by Nazanin Tomas RN  Outcome: Progressing  5/23/2023 1819 by Ranjit Rdz RN  Outcome: Progressing  Flowsheets (Taken 5/23/2023 1819)  Verbalizes/displays adequate comfort level or baseline comfort level:   Encourage patient to monitor pain and request assistance   Assess pain using appropriate pain scale  Note: Reports pain management effective     Problem: Safety - Adult  Goal: Free from fall injury  5/24/2023 0659 by Nazanin Tomas RN  Outcome: Progressing  5/23/2023 1819 by Ranjit Rdz RN  Outcome: Progressing  Flowsheets (Taken 5/23/2023 1819)  Free From Fall Injury: Instruct family/caregiver on patient safety  Note: Pt agrees to make needs known with call llight

## 2023-05-24 NOTE — CARE COORDINATION
[facility]    Patient aware and agreeable to services.  Faxed orders to [facility] for [facility] by 5/26    Mishel Amezcua LPN  Care Transition Nurse  [address]  [phone]
housing:   Potential Assistance needed at discharge: Home Care            Potential DME:    Patient expects to discharge to: [facility] for transportation at discharge: Other (see comment)    Financial    Payor: MEDICARE / Plan: MEDICARE PART A AND B / Product Type: *No Product type* /     Does insurance require precert for SNF: No    Potential assistance Purchasing Medications: No  Meds-to-Beds request:        Americo Shone [address] [facility], [address] [place] [phone]  [address] [place] [address]  Phone: [phone] Fax: [phone]      Notes:    Factors facilitating achievement of predicted outcomes: Family support and Good insight into deficits    Barriers to discharge: Medical complications    Additional Case Management Notes: patient is from home with wife and daughter. Patient agreeable to home care. Referral sent to [facility]. She states [facility]. Daughter will transport at [place]. The Plan for Transition of Care is related to the following treatment goals of Rectal cancer ([address] [H91]    IF APPLICABLE: The Patient and/or patient representative Jose Raul Collier and his family were provided with a choice of provider and agrees with the discharge plan. Freedom of choice list with basic dialogue that supports the patient's individualized plan of care/goals and shares the quality data associated with the providers was provided to: Patient   Patient Representative Name:       The Patient and/or Patient Representative Agree with the Discharge Plan?  Yes    Trevor Phelan RN  Case Management Department  Ph: [phone] Fax: [phone]

## 2023-05-24 NOTE — PLAN OF CARE
Latex:  Patient denies allergy to latex.  Medications reviewed with patient.  Tobacco use verified.  Allergies verified.    REFERRING MD:  Rob Brantley DO  Primary Medical Doctor: Rob Brantley DO   DATE OF INJURY/SURGERY:  DOS 10/21/19  WORK RELATED: No  OCCUPATION: retired (legally blind)    Patient is here for left knee follow up.  Patient states doing pretty good. Patient is walking with a walker today. She is concerned that she can't bend her knee well and it is painful to bend. She states therapy is going well. Staples removed and steri strips applied.  She is taking percocet for pain.      Problem: Discharge Planning  Goal: Discharge to home or other facility with appropriate resources  Outcome: Adequate for Discharge     Problem: Pain  Goal: Verbalizes/displays adequate comfort level or baseline comfort level  5/24/2023 1457 by Yong Moore RN  Outcome: Adequate for Discharge     Problem: Safety - Adult  Goal: Free from fall injury  5/24/2023 1457 by Yong Moore RN  Outcome: Adequate for Discharge     Problem: Skin/Tissue Integrity  Goal: Absence of new skin breakdown  Description: 1. Monitor for areas of redness and/or skin breakdown  2. Assess vascular access sites hourly  3. Every 4-6 hours minimum:  Change oxygen saturation probe site  4. Every 4-6 hours:  If on nasal continuous positive airway pressure, respiratory therapy assess nares and determine need for appliance change or resting period.   Outcome: Adequate for Discharge 10 years ago

## 2023-05-24 NOTE — PROGRESS NOTES
General Surgery  Post-operative Note      Procedure(s) Performed: Transanal excision of rectal cancer    Subjective:   Patient's pain is controlled, denies nausea or vomiting. Tolerating diet. OOB and ambulating. Denies flatus or BM at this time. Objective:  Anesthesia type: General    Vitals:   Vitals:    05/23/23 1608 05/23/23 1655 05/23/23 1949 05/23/23 2329   BP: (!) 144/87  (!) 147/84 (!) 157/75   Pulse: 72  80 (!) 101   Resp: 16   20   Temp: 97.4 °F (36.3 °C)  98 °F (36.7 °C) 98.3 °F (36.8 °C)   TempSrc: Axillary  Oral Oral   SpO2: 95% 98% 95% 97%   Weight:       Height:           Physical Exam:  Post-op vital signs:  Stable   General appearance: alert, no acute distress, grooming appropriate  Eyes: No scleral icterus, EOM grossly intact  Neck: trachea midline, no JVD, no lymphadenopathy, neck supple  Chest/Lungs: Audible wheezes, normal effort with no accessory muscle use on 2L NC  Cardiovascular: RRR, no murmurs/gallops/rubs, S1 and S2 noted, well perfused  Abdomen: Soft, non-tender, non-distended, no rebound, guarding, or rigidity. Skin: warm and dry, no rashes  Extremities: no edema, no cyanosis  Neuro: A&Ox3, no focal deficits, sensation intact    Assessment and Plan  This is a 82y.o. year old male status post transanal excision of rectal cancer secondary to rectal cancer. (5/23/2023) POD0. Pain management: Roxicodone pain panel and Dilaudid pain panel PRN  Cardiovasc: hemodynamically stable, will continue to monitor  Respiratory:  IS ordered to bedside, encourage hourly IS and deep breathing, wean oxygen as tolerated  Fluids:  SLIV, Diet: General diet  : Patient remains a void check - will follow up in 4 hours. 388 on bladder scan, will give flomax and give patient a chance to void independently/  Ambulation: OOB to chair, encourage ambulation  Prophylaxis: SCDs, lovenox  Antibiotics: Non indicated at this time.    Wound: Local wound care      Iker Gilliland DO, MS  PGY2, General

## 2023-05-24 NOTE — FLOWSHEET NOTE
Bladder scan for post void residual, confirmed with second nurse d/t pt unsure if he voided or not.      05/24/23 1309   Urine Assessment   Bladder Scan Volume (mL) 30 mL  (post void, no hat in toilet, water was yellow)   $ Bladder scan $ Yes

## 2023-05-25 ENCOUNTER — TELEPHONE (OUTPATIENT)
Dept: SURGERY | Age: 88
End: 2023-05-25

## 2023-05-25 NOTE — TELEPHONE ENCOUNTER
Susana Welsh from [facility] called in to confirm if Dr. Pasquale Mckinley will sign off on the patient's home care orders. Please call her at [phone].

## 2023-05-25 NOTE — TELEPHONE ENCOUNTER
[facility] akiko Wild with [address]. Kenneth Machado will be signing orders for patient. Fax and phone numbers given.

## 2023-05-29 ENCOUNTER — CLINICAL DOCUMENTATION (OUTPATIENT)
Dept: SURGERY | Age: 88
End: 2023-05-29

## 2023-05-29 NOTE — PROGRESS NOTES
Jesika Fischer  [id number]  1935     Rectal Cancer Tumor Board Pretreatment Summary:  Re-Presented on 5/26/23    BEFORE TAE:   - Tumor location: low  - Sphincter involvement: possible internal sphincter  - Clinical Stage: cT1/2N0M0  - Pretreatment circumferential margin status: threatened  - CEA: 6.74 (3/15/23)  - Path reviewed by MDT member: yes - Miguel Barrientos  - MRI reviewed by MDT member: demian Dominguez  - Neoadjuvant treatment recommendation: yes  - Type and duration of neoadjuvant therapy recommended: consider neoadjuvant chemo/rads  - Anticipated date and type of surgical procedure: possible transanal excision either before or after chemo/rads  - Clinical research study eligibility and/or enrollment: no     Other:  - MMR intact  - Per presenting surgeon, patient refusing APR/colostomy under any circumstances and may refuse chemo/rads as well   - consider palliative/debulking transanal excision if tumor amenable    AFTER TAE:  - Clinical Stage: cT1/2N0M0  - Pretreatment CEA: 6.74 (3/15/23)  - Neoadjuvant therapy before surgery: N/A  - Type of neoadjuvant therapy: N/A  - Neoadjuvant therapy date of completion: N/A  - Surgical procedure: TAE  - Date of surgery: 5/23/23  - Final path stage: adenoCA, T3Nx  - Tumor regression Grade: N/A  - MSI status: intact  - CRM: N/A  - (+) deep and mucosal margins with fragmentation of specimen  - Mesorectal grade: N/A  - Recommendation for adjuvant therapy and regimen: Patient refusing APR/colostomy; recommend palliative chemo/rads or rads if patient amenable

## 2023-09-22 ENCOUNTER — HOSPITAL ENCOUNTER (OUTPATIENT)
Dept: MRI IMAGING | Age: 88
Discharge: HOME | End: 2023-09-22
Payer: MEDICARE

## 2023-09-22 ENCOUNTER — HOSPITAL ENCOUNTER (OUTPATIENT)
Dept: CT IMAGING | Age: 88
Discharge: HOME | End: 2023-09-22
Payer: MEDICARE

## 2023-09-22 DIAGNOSIS — C20 MALIGNANT NEOPLASM OF RECTUM (HCC): ICD-10-CM

## 2023-09-22 LAB
PERFORMED ON: ABNORMAL
POC CREATININE: 0.6 MG/DL (ref 0.8–1.3)
POC SAMPLE TYPE: ABNORMAL

## 2023-09-22 PROCEDURE — 82565 ASSAY OF CREATININE: CPT

## 2023-09-22 PROCEDURE — 71260 CT THORAX DX C+: CPT

## 2023-09-22 PROCEDURE — 72197 MRI PELVIS W/O & W/DYE: CPT

## 2023-09-22 PROCEDURE — 6360000004 HC RX CONTRAST MEDICATION: Performed by: NURSE PRACTITIONER

## 2023-09-22 PROCEDURE — A9579 GAD-BASE MR CONTRAST NOS,1ML: HCPCS | Performed by: NURSE PRACTITIONER

## 2023-09-22 RX ADMIN — GADOTERIDOL 20 ML: 279.3 INJECTION, SOLUTION INTRAVENOUS at 15:01

## 2023-09-22 RX ADMIN — IOPAMIDOL 75 ML: 755 INJECTION, SOLUTION INTRAVENOUS at 13:44

## 2023-09-22 RX ADMIN — DIATRIZOATE MEGLUMINE AND DIATRIZOATE SODIUM 12 ML: 660; 100 LIQUID ORAL; RECTAL at 13:47

## 2023-10-16 ENCOUNTER — TELEPHONE (OUTPATIENT)
Dept: SURGERY | Age: 88
End: 2023-10-16

## 2023-10-16 NOTE — TELEPHONE ENCOUNTER
Patient has been scheduled for:    Procedure:Flex Sig   Date:11/1/23   Time:9:00 AM   Arrival:7:30 AM   [facility]  [facility]A?:Aspirin, 7 days   Prep? Enema at  bedtime     Pre-op? Post-op Appt? N/A     Patient advised they will need a . Orders faxed to surgery scheduling. Instructions have been mailed/emailed to: [email or web address]

## 2023-10-19 NOTE — TELEPHONE ENCOUNTER
Patient is having computer issues at home and is unable to receive anything e-mailed to him. Patient is requesting that the team please mail the required information for surgery to the address on file.

## 2023-10-31 ENCOUNTER — TELEPHONE (OUTPATIENT)
Dept: SURGERY | Age: 88
End: 2023-10-31

## 2023-10-31 NOTE — TELEPHONE ENCOUNTER
Spoke with pt and confirmed procedure, 11/1/23. Arrival time 7:30 AM, enema at bedtime, NPO after midnight, need  25 or older.

## 2023-11-01 ENCOUNTER — HOSPITAL ENCOUNTER (OUTPATIENT)
Age: 88
Setting detail: OUTPATIENT SURGERY
Discharge: HOME | End: 2023-11-01
Attending: SURGERY | Admitting: SURGERY
Payer: MEDICARE

## 2023-11-01 ENCOUNTER — ANESTHESIA EVENT (OUTPATIENT)
Dept: ENDOSCOPY | Age: 88
End: 2023-11-01
Payer: MEDICARE

## 2023-11-01 ENCOUNTER — ANESTHESIA (OUTPATIENT)
Dept: ENDOSCOPY | Age: 88
End: 2023-11-01
Payer: MEDICARE

## 2023-11-01 VITALS
WEIGHT: 230 LBS | HEIGHT: 67 IN | RESPIRATION RATE: 16 BRPM | HEART RATE: 71 BPM | TEMPERATURE: 97.5 F | SYSTOLIC BLOOD PRESSURE: 123 MMHG | OXYGEN SATURATION: 93 % | BODY MASS INDEX: 36.1 KG/M2 | DIASTOLIC BLOOD PRESSURE: 61 MMHG

## 2023-11-01 DIAGNOSIS — C20 RECTAL CANCER (HCC): ICD-10-CM

## 2023-11-01 PROCEDURE — 6360000002 HC RX W HCPCS: Performed by: NURSE ANESTHETIST, CERTIFIED REGISTERED

## 2023-11-01 PROCEDURE — 7100000010 HC PHASE II RECOVERY - FIRST 15 MIN: Performed by: SURGERY

## 2023-11-01 PROCEDURE — 7100000011 HC PHASE II RECOVERY - ADDTL 15 MIN: Performed by: SURGERY

## 2023-11-01 PROCEDURE — 2580000003 HC RX 258: Performed by: NURSE ANESTHETIST, CERTIFIED REGISTERED

## 2023-11-01 PROCEDURE — 3700000000 HC ANESTHESIA ATTENDED CARE: Performed by: SURGERY

## 2023-11-01 PROCEDURE — 2580000003 HC RX 258: Performed by: ANESTHESIOLOGY

## 2023-11-01 PROCEDURE — 2709999900 HC NON-CHARGEABLE SUPPLY: Performed by: SURGERY

## 2023-11-01 PROCEDURE — 3609008300 HC SIGMOIDOSCOPY W/BIOPSY SINGLE/MULTIPLE: Performed by: SURGERY

## 2023-11-01 PROCEDURE — 88305 TISSUE EXAM BY PATHOLOGIST: CPT

## 2023-11-01 PROCEDURE — 2500000003 HC RX 250 WO HCPCS: Performed by: NURSE ANESTHETIST, CERTIFIED REGISTERED

## 2023-11-01 RX ORDER — SODIUM CHLORIDE, SODIUM LACTATE, POTASSIUM CHLORIDE, CALCIUM CHLORIDE 600; 310; 30; 20 MG/100ML; MG/100ML; MG/100ML; MG/100ML
INJECTION, SOLUTION INTRAVENOUS CONTINUOUS
Status: DISCONTINUED | OUTPATIENT
Start: 2023-11-01 | End: 2023-11-01 | Stop reason: HOSPADM

## 2023-11-01 RX ORDER — PROPOFOL 10 MG/ML
INJECTION, EMULSION INTRAVENOUS PRN
Status: DISCONTINUED | OUTPATIENT
Start: 2023-11-01 | End: 2023-11-01 | Stop reason: SDUPTHER

## 2023-11-01 RX ORDER — LIDOCAINE HYDROCHLORIDE 20 MG/ML
INJECTION, SOLUTION EPIDURAL; INFILTRATION; INTRACAUDAL; PERINEURAL PRN
Status: DISCONTINUED | OUTPATIENT
Start: 2023-11-01 | End: 2023-11-01 | Stop reason: SDUPTHER

## 2023-11-01 RX ORDER — SODIUM CHLORIDE, SODIUM LACTATE, POTASSIUM CHLORIDE, CALCIUM CHLORIDE 600; 310; 30; 20 MG/100ML; MG/100ML; MG/100ML; MG/100ML
INJECTION, SOLUTION INTRAVENOUS CONTINUOUS PRN
Status: DISCONTINUED | OUTPATIENT
Start: 2023-11-01 | End: 2023-11-01 | Stop reason: SDUPTHER

## 2023-11-01 RX ADMIN — PROPOFOL 70 MG: 10 INJECTION, EMULSION INTRAVENOUS at 08:58

## 2023-11-01 RX ADMIN — PROPOFOL 100 MCG/KG/MIN: 10 INJECTION, EMULSION INTRAVENOUS at 08:57

## 2023-11-01 RX ADMIN — SODIUM CHLORIDE, POTASSIUM CHLORIDE, SODIUM LACTATE AND CALCIUM CHLORIDE: 600; 310; 30; 20 INJECTION, SOLUTION INTRAVENOUS at 08:47

## 2023-11-01 RX ADMIN — LIDOCAINE HYDROCHLORIDE 100 MG: 20 INJECTION, SOLUTION EPIDURAL; INFILTRATION; INTRACAUDAL; PERINEURAL at 08:58

## 2023-11-01 RX ADMIN — SODIUM CHLORIDE, SODIUM LACTATE, POTASSIUM CHLORIDE, AND CALCIUM CHLORIDE: .6; .31; .03; .02 INJECTION, SOLUTION INTRAVENOUS at 08:53

## 2023-11-01 ASSESSMENT — PAIN SCALES - WONG BAKER
WONGBAKER_NUMERICALRESPONSE: 0
WONGBAKER_NUMERICALRESPONSE: 0

## 2023-11-01 ASSESSMENT — ENCOUNTER SYMPTOMS: SHORTNESS OF BREATH: 1

## 2023-11-01 ASSESSMENT — PAIN - FUNCTIONAL ASSESSMENT: PAIN_FUNCTIONAL_ASSESSMENT: 0-10

## 2023-11-01 NOTE — BRIEF OP NOTE
Brief Postoperative Note      Patient: Mohit Roy  YOB: 1935  MRN: [id number]    Date of Procedure: 11/1/2023    Pre-Op Diagnosis Codes:     * Rectal cancer ([address]) [C20] post transanal excision    Post-Op Diagnosis: Same       Procedure(s):  SIGMOIDOSCOPY BIOPSY FLEXIBLE    Surgeon(s):  Medardo Cook MD    Assistant:  No qualified residents available    Anesthesia: Monitor Anesthesia Care    Estimated Blood Loss (mL): Minimal    Complications: None    Specimens:   ID Type Source Tests Collected by Time Destination   A : RANDOM SIGMOID COLON BX Tissue Colon SURGICAL PATHOLOGY Medardo Cook MD 11/1/2023 0902        OPERATIVE NOTE    PREOPERATIVE DIAGNOSIS: History of rectal cancer  POSTOPERATIVE DIAGNOSIS: Same  PROCEDURE: Sigmoidoscopy flexible with biopsy  ANESTHESIA: MAC  SURGEONS: Rosa Maria Joaquin M.D.  ASSISTANTS: No qualified residents available to assist   PREP QUALITY: Good  ESTIMATED BLOOD LOSS: 1 cc    OPERATIVE NOTE    After informed consent was obtained the patient was taken to the endoscopy suite. Time out was called to confirm key components. We began by performing a digital rectal exam: No lesions were noted on digital exam.     We then performed sigmoidoscopy complete to about 15 cm where we were blocked by stool. The exam was not difficult. The prep was poor but we could see the distal rectum. There was an irregular area at site of prior transanal excision. This was biopsied several times. It was not obviously malignant. Resection and retrieval for all lesions was complete. Good hemostasis was seen. Dr. Zina Rivero was present and performed all portions. The patient tolerated well with no immediate complication.  Follow up is recommended based on pathology    Rosa Maria Joaquin M.D.  11/1/23   9:07 AM

## 2023-11-01 NOTE — ANESTHESIA POSTPROCEDURE EVALUATION
Department of Anesthesiology  Postprocedure Note    Patient: Viky Cortes  MRN: [id number]  YOB: 1935  Date of evaluation: 11/1/2023      Procedure Summary     Date: 11/01/23 Room / Location: [address]    Anesthesia Start: 7380 Anesthesia Stop: 6306    Procedure: SIGMOIDOSCOPY BIOPSY FLEXIBLE Diagnosis:       Rectal cancer ([address])      (Rectal cancer ([address]) [place])    Surgeons: Bartolome Ortiz MD Responsible Provider: Janie Levy MD    Anesthesia Type: MAC ASA Status: 3          Anesthesia Type: No value filed.     Ara Phase I: Ara Score: 10    Ara Phase II: Ara Score: 10      Anesthesia Post Evaluation    Patient location during evaluation: PACU  Patient participation: complete - patient participated  Level of consciousness: awake  Pain score: 0  Airway patency: patent  Nausea & Vomiting: no nausea  Complications: no  Cardiovascular status: hemodynamically stable  Respiratory status: acceptable  Hydration status: stable  Pain management: satisfactory to patient

## 2023-11-01 NOTE — H&P
Subjective:     Patient is a 82 y.o. man with rectal cancer    HPI: Rectal cancer post transanal excision. He declined APR given his overall health. Here for surveillance sigmoidoscopy.  He reports decreased bleeding and pain since last seen     Patient Active Problem List    Diagnosis Date Noted    Chest pain 07/15/2011    Rectal cancer ([address]) 05/23/2023    CAD (coronary artery disease)     Obesity     Lumbar facet arthropathy 04/17/2019    Spinal stenosis of lumbar region 04/17/2019    Disc displacement, lumbar 04/17/2019    Cervical disc displacement 04/17/2019    Osteoarthritis of cervical spine 04/17/2019    Cervical stenosis of spinal canal 04/17/2019    Cervical radiculopathy, chronic 11/30/2018    Cervical stenosis of spine 11/30/2018    Cervical spondylosis without myelopathy 11/30/2018    Displacement of lumbar intervertebral disc without myelopathy 11/07/2018    Degeneration of lumbar or lumbosacral intervertebral disc 11/07/2018    Spinal stenosis, lumbar region, without neurogenic claudication 11/07/2018    Lumbosacral spondylosis without myelopathy 11/07/2018    Primary insomnia 09/04/2018    Elevated lactic acid level 08/17/2018    Acute encephalopathy     SERA (acute kidney injury) ([address])     Acquired hypothyroidism     DARON (obstructive sleep apnea)     Ataxia 08/02/2018    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

## 2023-11-01 NOTE — OP NOTE
Operative Note    Postoperative Note        Patient: Kayla Jones  YOB: 1935  MRN: [id number]     Date of Procedure: 11/1/2023     Pre-Op Diagnosis Codes:     * Rectal cancer ([address]) [C20] post transanal excision     Post-Op Diagnosis: Same       Procedure(s):  SIGMOIDOSCOPY BIOPSY FLEXIBLE     Surgeon(s):  Sita Miranda MD     Assistant:  No qualified residents available     Anesthesia: Monitor Anesthesia Care     Estimated Blood Loss (mL): Minimal     Complications: None     Specimens:   ID Type Source Tests Collected by Time Destination   A : RANDOM SIGMOID COLON BX Tissue Colon SURGICAL PATHOLOGY Sita Miranda MD 11/1/2023 0902           OPERATIVE NOTE     PREOPERATIVE DIAGNOSIS: History of rectal cancer  POSTOPERATIVE DIAGNOSIS: Same  PROCEDURE: Sigmoidoscopy flexible with biopsy  ANESTHESIA: MAC  SURGEONS: Kaela Yuen M.D.  ASSISTANTS: No qualified residents available to assist   PREP QUALITY: Good  ESTIMATED BLOOD LOSS: 1 cc     OPERATIVE NOTE     After informed consent was obtained the patient was taken to the endoscopy suite. Time out was called to confirm key components. We began by performing a digital rectal exam: No lesions were noted on digital exam.      We then performed sigmoidoscopy complete to about 15 cm where we were blocked by stool. The exam was not difficult. The prep was poor but we could see the distal rectum. There was an irregular area at site of prior transanal excision. This was biopsied several times. It was not obviously malignant. Resection and retrieval for all lesions was complete. Good hemostasis was seen. Dr. Master Pizano was present and performed all portions. The patient tolerated well with no immediate complication.  Follow up is recommended based on pathology     Kaela Yuen M.D.  11/1/23   9:07 AM

## 2023-11-10 ENCOUNTER — TELEPHONE (OUTPATIENT)
Dept: SURGERY | Age: 88
End: 2023-11-10

## 2023-11-10 NOTE — TELEPHONE ENCOUNTER
SHIRA to call the office    Tumor board meeting today: plan is to continue observation    Biopsy earlier this month showed no cancer    See me ~ 3 months or sooner if problems    Please let him know if he calls back    Katherin De La Paz M.D.  11/10/23   3:50 PM

## 2023-11-12 ENCOUNTER — CLINICAL DOCUMENTATION (OUTPATIENT)
Dept: SURGERY | Age: 88
End: 2023-11-12

## 2024-02-16 NOTE — PROGRESS NOTES
[facility]  PROGRESS NOTE      Interval History  Patient seen and examined  Patient attempted to interview with accordion  but patient was not coherently answering any questions the  could not really translate    Last night he had an event where he went to the bathroom and tried to hurt himself with the shower cord.  Patient has a sitter now  Psych is consulted    Otherwise unable to get any information  Apparently he was talking to the IV pole earlier per sitter  He is not making eye contact he is restless.    Objective             I/O's  No intake or output data in the 24 hours ending 02/15/24 1932    Last Recorded Vitals    Vitals:    02/15/24 0235 02/15/24 0716 02/15/24 1133 02/15/24 1600   BP: (!) 168/98 (!) 167/96 (!) 148/95    BP Location:  RUE - Right upper extremity LUE - Left upper extremity    Patient Position:  Supine Supine    Pulse: (!) 113 98 91    Resp:  16 16    Temp:  98.6 °F (37 °C) 97.2 °F (36.2 °C)    TempSrc:  Oral Temporal    SpO2: 95% 92% 96% 96%   Weight:       Height:            Body mass index is 26.01 kg/m².    Physical Exam    Current Facility-Administered Medications   Medication Dose Route Frequency Provider Last Rate Last Admin    QUEtiapine (SEROquel) tablet 12.5 mg  12.5 mg Oral Daily Mikhail Moise MD   12.5 mg at 02/15/24 1007    QUEtiapine (SEROquel) tablet 25 mg  25 mg Oral Nightly Mikhail Moise MD        haloperidol lactate (HALDOL) 5 MG/ML short-acting injection 5 mg  5 mg Intramuscular Q6H PRN Quan Saleh MD        thiamine (VITAMIN B-1) 400 mg in sodium chloride 0.9 % 100 mL IVPB  400 mg Intravenous TID Mikhail Moise  mL/hr at 02/15/24 1539 400 mg at 02/15/24 1539    haloperidol lactate (HALDOL) 5 MG/ML short-acting injection 2 mg  2 mg Intravenous Q6H PRN Mikhail Moise MD   2 mg at 02/15/24 1345    heparin (porcine) injection 5,000 Units  5,000 Units Subcutaneous 3 times per day Quan Saleh MD         Mr. Suzi Dhillon complains of neck pain, right arm pain and numbness and tingling in both hands. He notes mild change in his fine motor control and gait. General Exam:  Pt is alert and oriented x 3 and in no acute distress. Gait is heel toe with no limp or instability. Mood and affect are appropriate. HEENT  Normocephalic. Neck ROM is mildly decreased with mild pain  No rash or skin irritation. Upper Extremities:  Bilateral upper extremity with 5/5 motor function  Sensation is intact to light touch form C6-8  Reflexes are 1+  Full painfree ROM wrists, elbows and shoulders. No cyanosis, clubbing, edema or rash and the vasculature is intact. Negative Goetz sign bilateral.    Lower Extremities:  Decreased DTR knees   No clonus. I reviewed MRI images of the cervical spine the office today from 2016 and 2018  They show progression of his moderate to severe central and foraminal stenosis from C3 to C7. They show degenerative spondylosis C6 his C3-C4    I reviewed AP and flexion-extension lateral x-rays of his cervical spine form a previous office visit. They show anterolisthesis of C3 on C4 flexion. Impression:  Cervical spondylosis with radiculopathy and myelopathy. I recommended a home traction unit and a home exercise program.  Continue to work with Dr. Lakhwinder Oliva. He understands he is at risk for quadriparesis with cervical trauma, including falls. iMlton Encarnacion M.D.    Randa Holcomb: Sree Espinoza MD acetaminophen (TYLENOL) tablet 650 mg  650 mg Oral Q4H PRN Quan Saleh MD        Or    acetaminophen (TYLENOL) suppository 650 mg  650 mg Rectal Q4H PRN Quan Saleh MD        ondansetron (ZOFRAN ODT) disintegrating tablet 4 mg  4 mg Oral Q6H PRN Quan Saleh MD        Or    ondansetron (ZOFRAN) injection 4 mg  4 mg Intravenous Q6H PRN Quan Saleh MD        polyethylene glycol (MIRALAX) packet 17 g  17 g Oral Daily PRN Quan Saleh MD        docusate sodium-sennosides (SENOKOT S) 50-8.6 MG 2 tablet  2 tablet Oral BID PRN Quan Saleh MD        bisacodyl (DULCOLAX) suppository 10 mg  10 mg Rectal Daily PRN Quan Saleh MD        magnesium hydroxide (MILK OF MAGNESIA) 400 MG/5ML suspension 30 mL  30 mL Oral Daily PRN Quan Saleh MD        melatonin tablet 3 mg  3 mg Oral Nightly PRN Quan Saleh MD        Potassium Standard Replacement Protocol (Levels 3.5 and lower)   Does not apply See Admin Instructions Quan Saleh MD        Potassium Replacement (Levels 3.6 - 4)   Does not apply See Admin Instructions Quan Saleh MD        Magnesium Standard Replacement Protocol   Does not apply See Admin Instructions Quan Saleh MD        Phosphorus Standard Replacement Protocol   Does not apply See Admin Instructions Quan Saleh MD        calcium carbonate (TUMS) chewable tablet 500 mg  500 mg Oral Q4H PRN Quan Saleh MD        pantoprazole (PROTONIX) EC tablet 40 mg  40 mg Oral QAM AC Quan Saleh MD   40 mg at 02/14/24 1655    sodium chloride 0.9 % flush bag 25 mL  25 mL Intravenous PRN Quan Saleh  mL/hr at 02/14/24 2127 25 mL at 02/14/24 2127    sodium chloride 0.9 % injection 2 mL  2 mL Intracatheter 2 times per day Quan Saleh MD   2 mL at 02/15/24 1020    dextrose 50 % injection 25 g  25 g Intravenous PRN Delfino,  MD Quan        dextrose 50 % injection 12.5 g  12.5 g Intravenous PRN Quan Saleh MD        glucagon (GLUCAGEN) injection 1 mg  1 mg Intramuscular PRN Quan Saleh MD        dextrose (GLUTOSE) 40 % gel 15 g  15 g Oral PRN Quan Saleh MD        dextrose (GLUTOSE) 40 % gel 30 g  30 g Oral PRN Quan Saleh MD        insulin lispro (ADMELOG,HumaLOG) - Correction Dose   Subcutaneous TID WC Quan Saleh MD   2 Units at 02/15/24 0854    insulin lispro (ADMELOG,HumaLOG) - Correction Dose   Subcutaneous Nightly Quan Saleh MD        lisinopril (ZESTRIL) tablet 10 mg  10 mg Oral Daily Quan Saleh MD   10 mg at 02/15/24 0853    hydrALAZINE (APRESOLINE) injection 10 mg  10 mg Intravenous Q6H PRN Quan Saleh MD        potassium CHLORIDE (KLOR-CON M) britta ER tablet 40 mEq  40 mEq Oral Once Quan Saleh MD           Labs     Recent Labs   Lab 02/15/24  0657 02/14/24  0904   WBC 9.1 7.5   HGB 14.4 13.8   HCT 42.9 40.5   MCV 86.7 86.5   MCH 29.1 29.5   MCHC 33.6 34.1    215       Recent Labs   Lab 02/15/24  0657 02/14/24  0904   SODIUM 138 138   POTASSIUM 3.9 3.6   CHLORIDE 107 106   CO2 21 27   BUN 25* 27*   CREATININE 1.14 1.15   GLUCOSE 120* 160*   CALCIUM 9.7 9.7   ALBUMIN 4.6 4.5   AST 21 13   GPT 22 20   BILIRUBIN 2.2* 1.7*        Microbiology Results       None                Imaging  MRI BRAIN WO CONTRAST    Result Date: 2/15/2024  EXAM: MRI BRAIN WO CONTRAST CLINICAL INDICATION: Altered mental status COMPARISON: CT head dated 2/14/2024 TECHNIQUE: Noncontrast brain MRI is attempted.  After a 3 plane localizer, axial diffusion weighted and axial T2 FLAIR propeller sequences are obtained.  The patient then crawled out of the magnet and terminated the exam. FINDINGS: Exam is incomplete and nondiagnostic. The diffusion weighted sequence is degraded due to motion.  No large area of restricted diffusion is seen to  suggest large territorial infarct, but small foci could be obscured by motion. The FLAIR sequence is incomplete.  No large area of FLAIR signal hyperintensity is seen, but small foci are present, but this is incompletely assessed. There is no hydrocephalus.  Cavum and septum pellucidum is seen.     Nondiagnostic and incomplete brain MRI as described above. Electronically Signed by: STEPHANIE GARNER M.D. Signed on: 2/15/2024 11:54 AM Workstation ID: [id number]    EEG Routine; With Video    Result Date: 2/15/2024  Mikhail Moise MD     2/15/2024 10:10 AM   Electroencephalogram Report Patient: Mirian 1967 [id number] Reason for study:  55yo M with altered mental status Procedure:  A 21 channel EEG was performed at the bedside.  The 10/20 international system of electrode placement was used and both bipolar and referential montages were monitored. EEG description:   There is some movement artifact. Patient sleeping throughout recording. Normal stage II sleep seen. There are no focal or epileptiform features in this recording Interpretation: This EEG obtained during sleep is within normal limits outside some movement artifact at times There is no epileptiform activity or focal features noted during the recording. A normal record does not exclude a seizure disorder. Clinical correlation is recommended.                Assessment and Plan:    Acute encephalopathy  No metabolic derangements noted  Toxicology screen is negative  MRI attempted but suboptimal because of motion  Grossly unremarkable  EEG also nondiagnostic  Looks more like psych related stress-induced  Psychiatrist consulted  She will discuss with the wife before making any recommendation  Continue sitter  Continue elopement protocol  Avoid cords around the patient  Watch for patient not getting out of the room.  Thyroid peroxidase, TSH, RPR, KASEY screen all negative.  Myeloperoxidase and serum protein is negative    Hypertension  Takes lisinopril and  blood pressure still high.  Will do as needed hydralazine    Diabetes mellitus-continue sliding scale coverage.    Disposition pending psych evaluation.      DVT Prophylaxis    Current Active Medications for DVT Prophylaxis (From admission, onward)           Stop     heparin (porcine) injection 5,000 Units  5,000 Units,   Subcutaneous,   3 times per day         --                                  Quan Saelh MD.  [facility]  Contact - by Perfect serve  [facility] - [phone]

## 2024-04-24 ENCOUNTER — HOSPITAL ENCOUNTER (OUTPATIENT)
Dept: MRI IMAGING | Age: 89
Discharge: HOME | End: 2024-04-24
Attending: INTERNAL MEDICINE
Payer: MEDICARE

## 2024-04-24 DIAGNOSIS — C20 MALIGNANT NEOPLASM OF RECTUM (HCC): ICD-10-CM

## 2024-04-24 PROCEDURE — 6360000004 HC RX CONTRAST MEDICATION: Performed by: INTERNAL MEDICINE

## 2024-04-24 PROCEDURE — 72197 MRI PELVIS W/O & W/DYE: CPT

## 2024-04-24 PROCEDURE — A9579 GAD-BASE MR CONTRAST NOS,1ML: HCPCS | Performed by: INTERNAL MEDICINE

## 2024-04-24 RX ADMIN — GADOTERIDOL 20 ML: 279.3 INJECTION, SOLUTION INTRAVENOUS at 16:05

## 2024-06-24 ENCOUNTER — HOSPITAL ENCOUNTER (EMERGENCY)
Age: 89
Discharge: HOME | End: 2024-06-24
Attending: EMERGENCY MEDICINE
Payer: MEDICARE

## 2024-06-24 VITALS
HEART RATE: 65 BPM | OXYGEN SATURATION: 97 % | SYSTOLIC BLOOD PRESSURE: 139 MMHG | WEIGHT: 230 LBS | RESPIRATION RATE: 19 BRPM | TEMPERATURE: 98 F | DIASTOLIC BLOOD PRESSURE: 67 MMHG | HEIGHT: 67 IN | BODY MASS INDEX: 36.1 KG/M2

## 2024-06-24 DIAGNOSIS — R33.9 RETENTION OF URINE: Primary | ICD-10-CM

## 2024-06-24 DIAGNOSIS — R60.0 PERIPHERAL EDEMA: ICD-10-CM

## 2024-06-24 LAB
ALBUMIN SERPL-MCNC: 3.9 G/DL (ref 3.4–5)
ALBUMIN/GLOB SERPL: 1.8 {RATIO} (ref 1.1–2.2)
ALP SERPL-CCNC: 68 U/L (ref 40–129)
ALT SERPL-CCNC: 10 U/L (ref 10–40)
ANION GAP SERPL CALCULATED.3IONS-SCNC: 8 MMOL/L (ref 3–16)
AST SERPL-CCNC: 14 U/L (ref 15–37)
BASOPHILS # BLD: 0 K/UL (ref 0–0.2)
BASOPHILS NFR BLD: 0.4 %
BILIRUB SERPL-MCNC: 0.7 MG/DL (ref 0–1)
BILIRUB UR QL STRIP.AUTO: NEGATIVE
BUN SERPL-MCNC: 15 MG/DL (ref 7–20)
CALCIUM SERPL-MCNC: 8.6 MG/DL (ref 8.3–10.6)
CHLORIDE SERPL-SCNC: 94 MMOL/L (ref 99–110)
CLARITY UR: CLEAR
CO2 SERPL-SCNC: 29 MMOL/L (ref 21–32)
COLOR UR: YELLOW
CREAT SERPL-MCNC: 1 MG/DL (ref 0.8–1.3)
DEPRECATED RDW RBC AUTO: 14.3 % (ref 12.4–15.4)
EOSINOPHIL # BLD: 0.1 K/UL (ref 0–0.6)
EOSINOPHIL NFR BLD: 1.7 %
GFR SERPLBLD CREATININE-BSD FMLA CKD-EPI: 73 ML/MIN/{1.73_M2}
GLUCOSE SERPL-MCNC: 93 MG/DL (ref 70–99)
GLUCOSE UR STRIP.AUTO-MCNC: NEGATIVE MG/DL
HCT VFR BLD AUTO: 35.9 % (ref 40.5–52.5)
HGB BLD-MCNC: 11.8 G/DL (ref 13.5–17.5)
HGB UR QL STRIP.AUTO: NEGATIVE
KETONES UR STRIP.AUTO-MCNC: NEGATIVE MG/DL
LEUKOCYTE ESTERASE UR QL STRIP.AUTO: NEGATIVE
LYMPHOCYTES # BLD: 0.3 K/UL (ref 1–5.1)
LYMPHOCYTES NFR BLD: 4.8 %
MCH RBC QN AUTO: 30.4 PG (ref 26–34)
MCHC RBC AUTO-ENTMCNC: 32.8 G/DL (ref 31–36)
MCV RBC AUTO: 92.8 FL (ref 80–100)
MONOCYTES # BLD: 0.8 K/UL (ref 0–1.3)
MONOCYTES NFR BLD: 14 %
NEUTROPHILS # BLD: 4.8 K/UL (ref 1.7–7.7)
NEUTROPHILS NFR BLD: 79.1 %
NITRITE UR QL STRIP.AUTO: NEGATIVE
NT-PROBNP SERPL-MCNC: 371 PG/ML (ref 0–449)
PH UR STRIP.AUTO: 8.5 [PH] (ref 5–8)
PLATELET # BLD AUTO: 213 K/UL (ref 135–450)
PMV BLD AUTO: 7.6 FL (ref 5–10.5)
POTASSIUM SERPL-SCNC: 4.8 MMOL/L (ref 3.5–5.1)
PROT SERPL-MCNC: 6.1 G/DL (ref 6.4–8.2)
PROT UR STRIP.AUTO-MCNC: NEGATIVE MG/DL
RBC # BLD AUTO: 3.87 M/UL (ref 4.2–5.9)
SODIUM SERPL-SCNC: 131 MMOL/L (ref 136–145)
SP GR UR STRIP.AUTO: 1.01 (ref 1–1.03)
UA COMPLETE W REFLEX CULTURE PNL UR: ABNORMAL
UA DIPSTICK W REFLEX MICRO PNL UR: ABNORMAL
URN SPEC COLLECT METH UR: ABNORMAL
UROBILINOGEN UR STRIP-ACNC: 2 E.U./DL
WBC # BLD AUTO: 6 K/UL (ref 4–11)

## 2024-06-24 PROCEDURE — 99283 EMERGENCY DEPT VISIT LOW MDM: CPT

## 2024-06-24 PROCEDURE — 85025 COMPLETE CBC W/AUTO DIFF WBC: CPT

## 2024-06-24 PROCEDURE — 80053 COMPREHEN METABOLIC PANEL: CPT

## 2024-06-24 PROCEDURE — 51702 INSERT TEMP BLADDER CATH: CPT

## 2024-06-24 PROCEDURE — 83880 ASSAY OF NATRIURETIC PEPTIDE: CPT

## 2024-06-24 PROCEDURE — 81003 URINALYSIS AUTO W/O SCOPE: CPT

## 2024-06-24 ASSESSMENT — PAIN SCALES - GENERAL: PAINLEVEL_OUTOF10: 7

## 2024-06-24 ASSESSMENT — PAIN - FUNCTIONAL ASSESSMENT: PAIN_FUNCTIONAL_ASSESSMENT: 0-10

## 2024-06-24 ASSESSMENT — PAIN DESCRIPTION - LOCATION: LOCATION: BACK

## 2024-06-25 NOTE — ED NOTES
Pt family member stated that she was able to switch pt over to the leg back at home. 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

## 2024-06-25 NOTE — ED PROVIDER NOTES
(Right); Cataract removal (Left, 06/17/2016); lumbar laminectomy (N/A, 01/06/2017); hip surgery (Left, 04/19/2017); back surgery; back surgery (11/2017); knee surgery; Anus surgery (N/A, 5/23/2023); and sigmoidoscopy (N/A, 11/1/2023).      PHYSICAL EXAM:  ED Triage Vitals [06/24/24 1957]   BP Temp Temp Source Pulse Respirations SpO2 Height Weight - Scale   139/67 98 °F (36.7 °C) Oral 65 19 97 % 1.702 m (5' 7\") 104.3 kg (230 lb)        Physical Exam   Abdomen benign.  Circumcised male genitalia.  Pitting bilateral lower extremity edema    DIAGNOSTIC RESULTS   LABS:   Labs Reviewed   CBC WITH AUTO DIFFERENTIAL - Abnormal; Notable for the following components:       Result Value    RBC 3.87 (*)     Hemoglobin 11.8 (*)     Hematocrit 35.9 (*)     Lymphocytes Absolute 0.3 (*)     All other components within normal limits   COMPREHENSIVE METABOLIC PANEL W/ REFLEX TO MG FOR LOW K - Abnormal; Notable for the following components:    Sodium 131 (*)     Chloride 94 (*)     Total Protein 6.1 (*)     AST 14 (*)     All other components within normal limits   URINALYSIS WITH REFLEX TO CULTURE - Abnormal; Notable for the following components:    pH, Urine 8.5 (*)     Urobilinogen, Urine 2.0 (*)     All other components within normal limits   BRAIN NATRIURETIC PEPTIDE      When ordered only abnormal lab results are displayed. All other labs were within normal range or not returned as of this dictation.     PROCEDURE ALATORRE CATHETER PLACEMENT  Patient was prepped and draped in sterile fashion.  Verbal consent was given for Alatorre catheter placement after discussion of risks, benefits and alternatives.  I placed a 16 [place] Alatorre catheter on the first attempt.  The patient tolerated the procedure well with minimal discomfort and no bleeding.  The balloon was inflated with 10 cc of sterile water.  About 200 cc of yellow urine returned.      EMERGENCY DEPARTMENT COURSE and DIFFERENTIAL DIAGNOSIS/MDM:     Vitals:    Vitals:    06/24/24

## 2024-07-18 PROBLEM — G89.4 CHRONIC PAIN SYNDROME: Status: ACTIVE | Noted: 2024-07-18

## 2024-07-18 PROBLEM — G89.29 CHRONIC LEFT SHOULDER PAIN: Status: ACTIVE | Noted: 2024-07-18

## 2024-07-18 PROBLEM — M25.512 CHRONIC LEFT SHOULDER PAIN: Status: ACTIVE | Noted: 2024-07-18

## 2024-07-26 ENCOUNTER — HOSPITAL ENCOUNTER (OUTPATIENT)
Dept: MRI IMAGING | Age: 89
Discharge: HOME | End: 2024-07-26
Payer: MEDICARE

## 2024-07-26 DIAGNOSIS — M75.102 ROTATOR CUFF TEAR ARTHROPATHY OF LEFT SHOULDER: ICD-10-CM

## 2024-07-26 DIAGNOSIS — M12.812 ROTATOR CUFF TEAR ARTHROPATHY OF LEFT SHOULDER: ICD-10-CM

## 2024-07-26 PROCEDURE — 73221 MRI JOINT UPR EXTREM W/O DYE: CPT

## 2024-09-10 PROBLEM — M96.1 POSTLAMINECTOMY SYNDROME: Status: ACTIVE | Noted: 2024-09-10

## 2024-10-21 ENCOUNTER — HOSPITAL ENCOUNTER (OUTPATIENT)
Dept: MRI IMAGING | Age: 89
Discharge: HOME | End: 2024-10-21
Attending: INTERNAL MEDICINE
Payer: MEDICARE

## 2024-10-21 ENCOUNTER — HOSPITAL ENCOUNTER (OUTPATIENT)
Dept: CT IMAGING | Age: 89
Discharge: HOME | End: 2024-10-21
Attending: INTERNAL MEDICINE
Payer: MEDICARE

## 2024-10-21 DIAGNOSIS — C20 MALIGNANT NEOPLASM OF RECTUM (HCC): ICD-10-CM

## 2024-10-21 LAB
PERFORMED ON: NORMAL
POC CREATININE: 0.8 MG/DL (ref 0.8–1.3)
POC SAMPLE TYPE: NORMAL

## 2024-10-21 PROCEDURE — 72197 MRI PELVIS W/O & W/DYE: CPT

## 2024-10-21 PROCEDURE — 82565 ASSAY OF CREATININE: CPT

## 2024-10-21 PROCEDURE — 6360000004 HC RX CONTRAST MEDICATION: Performed by: INTERNAL MEDICINE

## 2024-10-21 PROCEDURE — 74177 CT ABD & PELVIS W/CONTRAST: CPT

## 2024-10-21 PROCEDURE — A9579 GAD-BASE MR CONTRAST NOS,1ML: HCPCS | Performed by: INTERNAL MEDICINE

## 2024-10-21 RX ORDER — IOPAMIDOL 755 MG/ML
90 INJECTION, SOLUTION INTRAVASCULAR
Status: COMPLETED | OUTPATIENT
Start: 2024-10-21 | End: 2024-10-21

## 2024-10-21 RX ORDER — DIATRIZOATE MEGLUMINE AND DIATRIZOATE SODIUM 660; 100 MG/ML; MG/ML
12 SOLUTION ORAL; RECTAL
Status: DISCONTINUED | OUTPATIENT
Start: 2024-10-21 | End: 2024-10-22 | Stop reason: HOSPADM

## 2024-10-21 RX ADMIN — GADOTERIDOL 20 ML: 279.3 INJECTION, SOLUTION INTRAVENOUS at 14:49

## 2024-10-21 RX ADMIN — DIATRIZOATE MEGLUMINE AND DIATRIZOATE SODIUM 12 ML: 660; 100 LIQUID ORAL; RECTAL at 16:04

## 2024-10-21 RX ADMIN — IOPAMIDOL 90 ML: 755 INJECTION, SOLUTION INTRAVENOUS at 15:55

## 2024-11-13 PROBLEM — M50.30 DEGENERATION OF CERVICAL INTERVERTEBRAL DISC: Status: ACTIVE | Noted: 2024-11-13

## 2025-01-10 NOTE — DISCHARGE INSTRUCTIONS
January 10, 2025     Patient: Leo Watson   YOB: 2003   Date of Visit: 1/10/2025       To Whom It May Concern:    Leo Watson was seen in my clinic on 1/10/2025 at 9:45 am. Please excuse Leo for his absence from work on this day to make the appointment.    If you have any questions or concerns, please don't hesitate to call.         Sincerely,         Kallie Stock PA-C        CC: No Recipients   excellent care while you are here. You may receive a survey in the mail regarding your care. We would appreciate you taking a few minutes of your time to complete this survey.  Again, thank you for choosing the [facility]

## 2025-03-25 ENCOUNTER — TRANSCRIBE ORDERS (OUTPATIENT)
Dept: ADMINISTRATIVE | Age: 89
End: 2025-03-25

## 2025-03-25 DIAGNOSIS — C20 RECTAL CANCER (HCC): Primary | ICD-10-CM

## 2025-04-24 ENCOUNTER — HOSPITAL ENCOUNTER (OUTPATIENT)
Dept: CT IMAGING | Age: 89
Discharge: HOME | End: 2025-04-24
Payer: MEDICARE

## 2025-04-24 ENCOUNTER — HOSPITAL ENCOUNTER (OUTPATIENT)
Dept: MRI IMAGING | Age: 89
Discharge: HOME | End: 2025-04-24
Payer: MEDICARE

## 2025-04-24 DIAGNOSIS — C20 RECTAL CANCER (HCC): ICD-10-CM

## 2025-04-24 PROCEDURE — 2500000003 HC RX 250 WO HCPCS: Performed by: PHYSICIAN ASSISTANT

## 2025-04-24 PROCEDURE — 74177 CT ABD & PELVIS W/CONTRAST: CPT

## 2025-04-24 PROCEDURE — 6360000004 HC RX CONTRAST MEDICATION: Performed by: PHYSICIAN ASSISTANT

## 2025-04-24 PROCEDURE — 72197 MRI PELVIS W/O & W/DYE: CPT

## 2025-04-24 PROCEDURE — A9576 INJ PROHANCE MULTIPACK: HCPCS | Performed by: PHYSICIAN ASSISTANT

## 2025-04-24 RX ORDER — IOPAMIDOL 755 MG/ML
75 INJECTION, SOLUTION INTRAVASCULAR
Status: COMPLETED | OUTPATIENT
Start: 2025-04-24 | End: 2025-04-24

## 2025-04-24 RX ADMIN — BARIUM SULFATE 900 ML: 20 SUSPENSION ORAL at 14:47

## 2025-04-24 RX ADMIN — GADOTERIDOL 20 ML: 279.3 INJECTION, SOLUTION INTRAVENOUS at 15:23

## 2025-04-24 RX ADMIN — IOPAMIDOL 75 ML: 755 INJECTION, SOLUTION INTRAVENOUS at 14:47

## 2025-05-30 ENCOUNTER — TELEPHONE (OUTPATIENT)
Dept: SURGERY | Age: 89
End: 2025-05-30

## 2025-05-30 NOTE — TELEPHONE ENCOUNTER
Spoke to patient's wife Pham who informed me patient is still sleeping but she will relay the message to him to call Dr. Munoz's office.       ----- Message from Dr. Javier Munoz MD sent at 5/21/2025  1:14 PM EDT -----  Most recent MRI concerning for ulcer in rectum    Can we schedule flexible sigmoidoscopy for Mr. See at his convenience in endo suite    Thanks    Dr. Munoz

## 2025-06-02 NOTE — TELEPHONE ENCOUNTER
Patient called requesting if Shantal could call his daughter to schedule the surgery.    Pam See: [phone]

## 2025-06-03 ENCOUNTER — PATIENT MESSAGE (OUTPATIENT)
Dept: SURGERY | Age: 89
End: 2025-06-03

## 2025-06-03 ENCOUNTER — TELEPHONE (OUTPATIENT)
Dept: SURGERY | Age: 89
End: 2025-06-03

## 2025-06-03 ENCOUNTER — PREP FOR PROCEDURE (OUTPATIENT)
Dept: SURGERY | Age: 89
End: 2025-06-03

## 2025-06-03 NOTE — TELEPHONE ENCOUNTER
Patient has been scheduled for:    Procedure:  Flexible Sigmoidoscopy  Date: 6/17  Time: 7:30  Arrival: 6:00  Hospital: [facility]    ASA or blood thinning medications?: ASA  Any injectable medications for diabetes or weight loss GLP1 (\"tide\" medications)? no  Any SGLT-2 meds (\"flozin\" meds)? no    Prep? Fleet Enema    Pre-op?  N/A    Post-op Appt?   N/A    Patient advised they will need a .  Y    Case request sent and prep for proc orders done    Y    Medication sent to Pharmacy: N/A    Stents or ostomy marking?   N/A    Instructions have been mailed/emailed to: My Chart    Added to outlook calendar   Y    Spoke with patient's daughter- Pam See: [phone]

## (undated) DEVICE — TOWEL,STOP FLAG GOLD N-W: Brand: MEDLINE

## (undated) DEVICE — GOWN,SIRUS,POLYRNF,BRTHSLV,XL,30/CS: Brand: MEDLINE

## (undated) DEVICE — RECTAL: Brand: MEDLINE INDUSTRIES, INC.

## (undated) DEVICE — CANNULA SAMP CO2 AD GRN 7FT CO2 AND 7FT O2 TBNG UNIV CONN

## (undated) DEVICE — GLOVE ORANGE PI 8   MSG9080

## (undated) DEVICE — 4-PORT MANIFOLD: Brand: NEPTUNE 2

## (undated) DEVICE — SOLUTION IV 1000ML 0.9% SOD CHL

## (undated) DEVICE — PACK LAP IV REINF TBL CVR ADH UTIL UNDERBUTTOCK DRP W CUF

## (undated) DEVICE — FORCEPS BX L240CM WRK CHN 2.8MM STD CAP W/ NDL MIC MESH

## (undated) DEVICE — APPLIER CLP M/L SHFT DIA5MM 15 LIG LIGAMAX 5

## (undated) DEVICE — GLOVE ORANGE PI 8 1/2   MSG9085